# Patient Record
Sex: FEMALE | Race: BLACK OR AFRICAN AMERICAN | NOT HISPANIC OR LATINO | URBAN - METROPOLITAN AREA
[De-identification: names, ages, dates, MRNs, and addresses within clinical notes are randomized per-mention and may not be internally consistent; named-entity substitution may affect disease eponyms.]

---

## 2017-01-03 ENCOUNTER — OUTPATIENT (OUTPATIENT)
Dept: OUTPATIENT SERVICES | Facility: HOSPITAL | Age: 62
LOS: 1 days | End: 2017-01-03
Payer: MEDICARE

## 2017-01-03 ENCOUNTER — APPOINTMENT (OUTPATIENT)
Dept: SPINE | Facility: CLINIC | Age: 62
End: 2017-01-03

## 2017-01-03 ENCOUNTER — APPOINTMENT (OUTPATIENT)
Age: 62
End: 2017-01-03

## 2017-01-03 VITALS
HEIGHT: 65 IN | WEIGHT: 184 LBS | DIASTOLIC BLOOD PRESSURE: 77 MMHG | BODY MASS INDEX: 30.66 KG/M2 | SYSTOLIC BLOOD PRESSURE: 129 MMHG

## 2017-01-03 DIAGNOSIS — Z78.9 OTHER SPECIFIED HEALTH STATUS: ICD-10-CM

## 2017-01-03 DIAGNOSIS — Z85.9 PERSONAL HISTORY OF MALIGNANT NEOPLASM, UNSPECIFIED: ICD-10-CM

## 2017-01-03 DIAGNOSIS — C07 MALIGNANT NEOPLASM OF PAROTID GLAND: Chronic | ICD-10-CM

## 2017-01-03 DIAGNOSIS — A18.12 TUBERCULOSIS OF BLADDER: ICD-10-CM

## 2017-01-03 DIAGNOSIS — C43.72 MALIGNANT MELANOMA OF LEFT LOWER LIMB, INCLUDING HIP: Chronic | ICD-10-CM

## 2017-01-03 DIAGNOSIS — Z98.89 OTHER SPECIFIED POSTPROCEDURAL STATES: Chronic | ICD-10-CM

## 2017-01-03 DIAGNOSIS — M51.16 INTERVERTEBRAL DISC DISORDERS WITH RADICULOPATHY, LUMBAR REGION: ICD-10-CM

## 2017-01-03 DIAGNOSIS — C50.911 MALIGNANT NEOPLASM OF UNSPECIFIED SITE OF RIGHT FEMALE BREAST: Chronic | ICD-10-CM

## 2017-01-03 DIAGNOSIS — H50.9 UNSPECIFIED STRABISMUS: Chronic | ICD-10-CM

## 2017-01-03 PROCEDURE — 72083 X-RAY EXAM ENTIRE SPI 4/5 VW: CPT | Mod: 26

## 2017-01-03 RX ORDER — METOPROLOL SUCCINATE 50 MG/1
50 TABLET, EXTENDED RELEASE ORAL
Qty: 90 | Refills: 0 | Status: COMPLETED | COMMUNITY
Start: 2016-12-18

## 2017-01-03 RX ORDER — ADHESIVE TAPE 3"X 2.3 YD
50 MCG TAPE, NON-MEDICATED TOPICAL
Refills: 0 | Status: ACTIVE | COMMUNITY

## 2017-01-12 ENCOUNTER — OUTPATIENT (OUTPATIENT)
Dept: OUTPATIENT SERVICES | Facility: HOSPITAL | Age: 62
LOS: 1 days | End: 2017-01-12
Payer: MEDICARE

## 2017-01-12 ENCOUNTER — RESULT REVIEW (OUTPATIENT)
Age: 62
End: 2017-01-12

## 2017-01-12 ENCOUNTER — FORM ENCOUNTER (OUTPATIENT)
Age: 62
End: 2017-01-12

## 2017-01-12 ENCOUNTER — APPOINTMENT (OUTPATIENT)
Dept: RADIOLOGY | Facility: IMAGING CENTER | Age: 62
End: 2017-01-12

## 2017-01-12 ENCOUNTER — OUTPATIENT (OUTPATIENT)
Dept: OUTPATIENT SERVICES | Facility: HOSPITAL | Age: 62
LOS: 1 days | Discharge: HOME | End: 2017-01-12

## 2017-01-12 DIAGNOSIS — C50.911 MALIGNANT NEOPLASM OF UNSPECIFIED SITE OF RIGHT FEMALE BREAST: Chronic | ICD-10-CM

## 2017-01-12 DIAGNOSIS — C43.72 MALIGNANT MELANOMA OF LEFT LOWER LIMB, INCLUDING HIP: Chronic | ICD-10-CM

## 2017-01-12 DIAGNOSIS — Z98.89 OTHER SPECIFIED POSTPROCEDURAL STATES: Chronic | ICD-10-CM

## 2017-01-12 DIAGNOSIS — M51.16 INTERVERTEBRAL DISC DISORDERS WITH RADICULOPATHY, LUMBAR REGION: ICD-10-CM

## 2017-01-12 DIAGNOSIS — C07 MALIGNANT NEOPLASM OF PAROTID GLAND: Chronic | ICD-10-CM

## 2017-01-12 DIAGNOSIS — H50.9 UNSPECIFIED STRABISMUS: Chronic | ICD-10-CM

## 2017-01-12 DIAGNOSIS — C50.919 MALIGNANT NEOPLASM OF UNSPECIFIED SITE OF UNSPECIFIED FEMALE BREAST: ICD-10-CM

## 2017-01-12 PROCEDURE — 77080 DXA BONE DENSITY AXIAL: CPT

## 2017-01-13 ENCOUNTER — LABORATORY RESULT (OUTPATIENT)
Age: 62
End: 2017-01-13

## 2017-01-13 ENCOUNTER — APPOINTMENT (OUTPATIENT)
Dept: ULTRASOUND IMAGING | Facility: IMAGING CENTER | Age: 62
End: 2017-01-13

## 2017-01-13 ENCOUNTER — APPOINTMENT (OUTPATIENT)
Dept: INFUSION THERAPY | Facility: HOSPITAL | Age: 62
End: 2017-01-13

## 2017-01-13 ENCOUNTER — OUTPATIENT (OUTPATIENT)
Dept: OUTPATIENT SERVICES | Facility: HOSPITAL | Age: 62
LOS: 1 days | End: 2017-01-13
Payer: MEDICARE

## 2017-01-13 ENCOUNTER — APPOINTMENT (OUTPATIENT)
Dept: MRI IMAGING | Facility: IMAGING CENTER | Age: 62
End: 2017-01-13

## 2017-01-13 DIAGNOSIS — D10.5 BENIGN NEOPLASM OF OTHER PARTS OF OROPHARYNX: ICD-10-CM

## 2017-01-13 DIAGNOSIS — Z98.89 OTHER SPECIFIED POSTPROCEDURAL STATES: Chronic | ICD-10-CM

## 2017-01-13 DIAGNOSIS — H50.9 UNSPECIFIED STRABISMUS: Chronic | ICD-10-CM

## 2017-01-13 DIAGNOSIS — C50.911 MALIGNANT NEOPLASM OF UNSPECIFIED SITE OF RIGHT FEMALE BREAST: Chronic | ICD-10-CM

## 2017-01-13 DIAGNOSIS — C43.72 MALIGNANT MELANOMA OF LEFT LOWER LIMB, INCLUDING HIP: Chronic | ICD-10-CM

## 2017-01-13 DIAGNOSIS — R93.8 ABNORMAL FINDINGS ON DIAGNOSTIC IMAGING OF OTHER SPECIFIED BODY STRUCTURES: ICD-10-CM

## 2017-01-13 DIAGNOSIS — C07 MALIGNANT NEOPLASM OF PAROTID GLAND: Chronic | ICD-10-CM

## 2017-01-13 LAB
BASOPHILS # BLD AUTO: 0 K/UL — SIGNIFICANT CHANGE UP (ref 0–0.2)
BASOPHILS NFR BLD AUTO: 1.3 % — SIGNIFICANT CHANGE UP (ref 0–2)
EOSINOPHIL # BLD AUTO: 0.1 K/UL — SIGNIFICANT CHANGE UP (ref 0–0.5)
EOSINOPHIL NFR BLD AUTO: 2.5 % — SIGNIFICANT CHANGE UP (ref 0–6)
HCT VFR BLD CALC: 37.3 % — SIGNIFICANT CHANGE UP (ref 34.5–45)
HGB BLD-MCNC: 12.1 G/DL — SIGNIFICANT CHANGE UP (ref 11.5–15.5)
LYMPHOCYTES # BLD AUTO: 1.1 K/UL — SIGNIFICANT CHANGE UP (ref 1–3.3)
LYMPHOCYTES # BLD AUTO: 29.6 % — SIGNIFICANT CHANGE UP (ref 13–44)
MCHC RBC-ENTMCNC: 31.4 PG — SIGNIFICANT CHANGE UP (ref 27–34)
MCHC RBC-ENTMCNC: 32.6 GM/DL — SIGNIFICANT CHANGE UP (ref 32–36)
MCV RBC AUTO: 96.3 FL — SIGNIFICANT CHANGE UP (ref 80–100)
MONOCYTES # BLD AUTO: 0.3 K/UL — SIGNIFICANT CHANGE UP (ref 0–0.9)
MONOCYTES NFR BLD AUTO: 7.4 % — SIGNIFICANT CHANGE UP (ref 2–14)
NEUTROPHILS # BLD AUTO: 2.2 K/UL — SIGNIFICANT CHANGE UP (ref 1.8–7.4)
NEUTROPHILS NFR BLD AUTO: 59.3 % — SIGNIFICANT CHANGE UP (ref 43–77)
PLATELET # BLD AUTO: 223 K/UL — SIGNIFICANT CHANGE UP (ref 150–400)
RBC # BLD: 3.87 M/UL — SIGNIFICANT CHANGE UP (ref 3.8–5.2)
RBC # FLD: 11.5 % — SIGNIFICANT CHANGE UP (ref 10.3–14.5)
WBC # BLD: 3.8 K/UL — SIGNIFICANT CHANGE UP (ref 3.8–10.5)
WBC # FLD AUTO: 3.8 K/UL — SIGNIFICANT CHANGE UP (ref 3.8–10.5)

## 2017-01-13 PROCEDURE — 76830 TRANSVAGINAL US NON-OB: CPT

## 2017-01-13 PROCEDURE — 82565 ASSAY OF CREATININE: CPT

## 2017-01-13 PROCEDURE — 76856 US EXAM PELVIC COMPLETE: CPT

## 2017-01-13 PROCEDURE — A9585: CPT

## 2017-01-13 PROCEDURE — 70543 MRI ORBT/FAC/NCK W/O &W/DYE: CPT

## 2017-01-18 DIAGNOSIS — C50.919 MALIGNANT NEOPLASM OF UNSPECIFIED SITE OF UNSPECIFIED FEMALE BREAST: ICD-10-CM

## 2017-01-18 DIAGNOSIS — N83.202 UNSPECIFIED OVARIAN CYST, LEFT SIDE: ICD-10-CM

## 2017-01-18 DIAGNOSIS — J39.2 OTHER DISEASES OF PHARYNX: ICD-10-CM

## 2017-01-18 DIAGNOSIS — D25.1 INTRAMURAL LEIOMYOMA OF UTERUS: ICD-10-CM

## 2017-01-31 ENCOUNTER — APPOINTMENT (OUTPATIENT)
Dept: SPINE | Facility: CLINIC | Age: 62
End: 2017-01-31

## 2017-01-31 VITALS
WEIGHT: 184 LBS | HEART RATE: 67 BPM | DIASTOLIC BLOOD PRESSURE: 68 MMHG | BODY MASS INDEX: 30.66 KG/M2 | HEIGHT: 65 IN | SYSTOLIC BLOOD PRESSURE: 108 MMHG

## 2017-01-31 RX ORDER — TRAMADOL HYDROCHLORIDE 50 MG/1
50 TABLET, COATED ORAL
Qty: 60 | Refills: 0 | Status: COMPLETED | COMMUNITY
Start: 2016-12-16 | End: 2017-01-31

## 2017-01-31 RX ORDER — METOPROLOL SUCCINATE 50 MG/1
50 TABLET, EXTENDED RELEASE ORAL
Refills: 0 | Status: COMPLETED | COMMUNITY
End: 2017-01-31

## 2017-01-31 RX ORDER — ENALAPRIL MALEATE 5 MG/1
5 TABLET ORAL
Qty: 90 | Refills: 0 | Status: COMPLETED | COMMUNITY
Start: 2016-12-28 | End: 2017-01-31

## 2017-01-31 RX ORDER — GABAPENTIN 600 MG/1
600 TABLET, COATED ORAL
Qty: 180 | Refills: 0 | Status: COMPLETED | COMMUNITY
Start: 2016-12-18 | End: 2017-01-31

## 2017-02-08 ENCOUNTER — OUTPATIENT (OUTPATIENT)
Dept: OUTPATIENT SERVICES | Facility: HOSPITAL | Age: 62
LOS: 1 days | Discharge: HOME | End: 2017-02-08

## 2017-02-08 DIAGNOSIS — C43.72 MALIGNANT MELANOMA OF LEFT LOWER LIMB, INCLUDING HIP: Chronic | ICD-10-CM

## 2017-02-08 DIAGNOSIS — C50.919 MALIGNANT NEOPLASM OF UNSPECIFIED SITE OF UNSPECIFIED FEMALE BREAST: ICD-10-CM

## 2017-02-08 DIAGNOSIS — Z98.89 OTHER SPECIFIED POSTPROCEDURAL STATES: Chronic | ICD-10-CM

## 2017-02-08 DIAGNOSIS — C07 MALIGNANT NEOPLASM OF PAROTID GLAND: Chronic | ICD-10-CM

## 2017-02-08 DIAGNOSIS — C50.911 MALIGNANT NEOPLASM OF UNSPECIFIED SITE OF RIGHT FEMALE BREAST: Chronic | ICD-10-CM

## 2017-02-08 DIAGNOSIS — H50.9 UNSPECIFIED STRABISMUS: Chronic | ICD-10-CM

## 2017-02-10 ENCOUNTER — APPOINTMENT (OUTPATIENT)
Dept: INFUSION THERAPY | Facility: HOSPITAL | Age: 62
End: 2017-02-10

## 2017-02-17 ENCOUNTER — APPOINTMENT (OUTPATIENT)
Dept: INFUSION THERAPY | Facility: HOSPITAL | Age: 62
End: 2017-02-17

## 2017-02-28 ENCOUNTER — APPOINTMENT (OUTPATIENT)
Dept: INTERNAL MEDICINE | Facility: CLINIC | Age: 62
End: 2017-02-28

## 2017-02-28 VITALS — HEART RATE: 69 BPM | WEIGHT: 180 LBS | BODY MASS INDEX: 29.99 KG/M2 | HEIGHT: 65 IN

## 2017-02-28 VITALS — SYSTOLIC BLOOD PRESSURE: 120 MMHG | DIASTOLIC BLOOD PRESSURE: 80 MMHG

## 2017-03-01 LAB
ALBUMIN SERPL ELPH-MCNC: 4.2 G/DL
ALP BLD-CCNC: 103 U/L
ALT SERPL-CCNC: 8 U/L
ANION GAP SERPL CALC-SCNC: 16 MMOL/L
AST SERPL-CCNC: 14 U/L
BASOPHILS # BLD AUTO: 0.03 K/UL
BASOPHILS NFR BLD AUTO: 0.5 %
BILIRUB SERPL-MCNC: 0.3 MG/DL
BUN SERPL-MCNC: 15 MG/DL
CALCIUM SERPL-MCNC: 9.7 MG/DL
CHLORIDE SERPL-SCNC: 102 MMOL/L
CO2 SERPL-SCNC: 25 MMOL/L
CREAT SERPL-MCNC: 0.96 MG/DL
EOSINOPHIL # BLD AUTO: 0.2 K/UL
EOSINOPHIL NFR BLD AUTO: 3.4 %
GLUCOSE SERPL-MCNC: 87 MG/DL
HBA1C MFR BLD HPLC: 6 %
HCT VFR BLD CALC: 38.2 %
HGB BLD-MCNC: 12 G/DL
IMM GRANULOCYTES NFR BLD AUTO: 0.2 %
LYMPHOCYTES # BLD AUTO: 1.46 K/UL
LYMPHOCYTES NFR BLD AUTO: 25 %
MAN DIFF?: NORMAL
MCHC RBC-ENTMCNC: 31.4 GM/DL
MCHC RBC-ENTMCNC: 31.7 PG
MCV RBC AUTO: 100.8 FL
MONOCYTES # BLD AUTO: 0.4 K/UL
MONOCYTES NFR BLD AUTO: 6.9 %
NEUTROPHILS # BLD AUTO: 3.73 K/UL
NEUTROPHILS NFR BLD AUTO: 64 %
PLATELET # BLD AUTO: 218 K/UL
POTASSIUM SERPL-SCNC: 4.6 MMOL/L
PROT SERPL-MCNC: 6.9 G/DL
RBC # BLD: 3.79 M/UL
RBC # FLD: 12.7 %
SODIUM SERPL-SCNC: 143 MMOL/L
TSH SERPL-ACNC: 1.43 UIU/ML
WBC # FLD AUTO: 5.83 K/UL

## 2017-03-02 ENCOUNTER — MESSAGE (OUTPATIENT)
Age: 62
End: 2017-03-02

## 2017-03-06 ENCOUNTER — APPOINTMENT (OUTPATIENT)
Dept: OPHTHALMOLOGY | Facility: CLINIC | Age: 62
End: 2017-03-06

## 2017-03-06 DIAGNOSIS — H04.201 UNSPECIFIED EPIPHORA, RIGHT SIDE: ICD-10-CM

## 2017-03-10 ENCOUNTER — MESSAGE (OUTPATIENT)
Age: 62
End: 2017-03-10

## 2017-03-15 ENCOUNTER — RESULT CHARGE (OUTPATIENT)
Age: 62
End: 2017-03-15

## 2017-03-16 ENCOUNTER — APPOINTMENT (OUTPATIENT)
Dept: INTERNAL MEDICINE | Facility: CLINIC | Age: 62
End: 2017-03-16

## 2017-03-16 ENCOUNTER — OUTPATIENT (OUTPATIENT)
Dept: OUTPATIENT SERVICES | Facility: HOSPITAL | Age: 62
LOS: 1 days | Discharge: HOME | End: 2017-03-16

## 2017-03-16 ENCOUNTER — RESULT REVIEW (OUTPATIENT)
Age: 62
End: 2017-03-16

## 2017-03-16 ENCOUNTER — OUTPATIENT (OUTPATIENT)
Dept: OUTPATIENT SERVICES | Facility: HOSPITAL | Age: 62
LOS: 1 days | End: 2017-03-16
Payer: MEDICARE

## 2017-03-16 ENCOUNTER — NON-APPOINTMENT (OUTPATIENT)
Age: 62
End: 2017-03-16

## 2017-03-16 VITALS
SYSTOLIC BLOOD PRESSURE: 120 MMHG | DIASTOLIC BLOOD PRESSURE: 72 MMHG | OXYGEN SATURATION: 95 % | HEART RATE: 73 BPM | WEIGHT: 177 LBS | HEIGHT: 65 IN | BODY MASS INDEX: 29.49 KG/M2

## 2017-03-16 VITALS
DIASTOLIC BLOOD PRESSURE: 79 MMHG | RESPIRATION RATE: 16 BRPM | HEART RATE: 79 BPM | SYSTOLIC BLOOD PRESSURE: 116 MMHG | TEMPERATURE: 97 F | HEIGHT: 65 IN | WEIGHT: 174.17 LBS | OXYGEN SATURATION: 97 %

## 2017-03-16 DIAGNOSIS — C50.911 MALIGNANT NEOPLASM OF UNSPECIFIED SITE OF RIGHT FEMALE BREAST: Chronic | ICD-10-CM

## 2017-03-16 DIAGNOSIS — H50.9 UNSPECIFIED STRABISMUS: Chronic | ICD-10-CM

## 2017-03-16 DIAGNOSIS — I10 ESSENTIAL (PRIMARY) HYPERTENSION: ICD-10-CM

## 2017-03-16 DIAGNOSIS — M48.06 SPINAL STENOSIS, LUMBAR REGION: ICD-10-CM

## 2017-03-16 DIAGNOSIS — Z98.89 OTHER SPECIFIED POSTPROCEDURAL STATES: Chronic | ICD-10-CM

## 2017-03-16 DIAGNOSIS — M54.16 RADICULOPATHY, LUMBAR REGION: ICD-10-CM

## 2017-03-16 DIAGNOSIS — C43.72 MALIGNANT MELANOMA OF LEFT LOWER LIMB, INCLUDING HIP: Chronic | ICD-10-CM

## 2017-03-16 DIAGNOSIS — Z01.818 ENCOUNTER FOR OTHER PREPROCEDURAL EXAMINATION: ICD-10-CM

## 2017-03-16 DIAGNOSIS — C07 MALIGNANT NEOPLASM OF PAROTID GLAND: Chronic | ICD-10-CM

## 2017-03-16 DIAGNOSIS — C50.919 MALIGNANT NEOPLASM OF UNSPECIFIED SITE OF UNSPECIFIED FEMALE BREAST: ICD-10-CM

## 2017-03-16 LAB
ANION GAP SERPL CALC-SCNC: 15 MMOL/L — SIGNIFICANT CHANGE UP (ref 5–17)
BLD GP AB SCN SERPL QL: NEGATIVE — SIGNIFICANT CHANGE UP
BUN SERPL-MCNC: 22 MG/DL — SIGNIFICANT CHANGE UP (ref 7–23)
CALCIUM SERPL-MCNC: 10 MG/DL — SIGNIFICANT CHANGE UP (ref 8.4–10.5)
CHLORIDE SERPL-SCNC: 104 MMOL/L — SIGNIFICANT CHANGE UP (ref 96–108)
CO2 SERPL-SCNC: 24 MMOL/L — SIGNIFICANT CHANGE UP (ref 22–31)
CREAT SERPL-MCNC: 1.31 MG/DL — HIGH (ref 0.5–1.3)
GLUCOSE SERPL-MCNC: 103 MG/DL — HIGH (ref 70–99)
HCT VFR BLD CALC: 38.5 % — SIGNIFICANT CHANGE UP (ref 34.5–45)
HGB BLD-MCNC: 12.4 G/DL — SIGNIFICANT CHANGE UP (ref 11.5–15.5)
MCHC RBC-ENTMCNC: 31.4 PG — SIGNIFICANT CHANGE UP (ref 27–34)
MCHC RBC-ENTMCNC: 32.2 GM/DL — SIGNIFICANT CHANGE UP (ref 32–36)
MCV RBC AUTO: 97.5 FL — SIGNIFICANT CHANGE UP (ref 80–100)
PLATELET # BLD AUTO: 233 K/UL — SIGNIFICANT CHANGE UP (ref 150–400)
POTASSIUM SERPL-MCNC: 4.6 MMOL/L — SIGNIFICANT CHANGE UP (ref 3.5–5.3)
POTASSIUM SERPL-SCNC: 4.6 MMOL/L — SIGNIFICANT CHANGE UP (ref 3.5–5.3)
RBC # BLD: 3.95 M/UL — SIGNIFICANT CHANGE UP (ref 3.8–5.2)
RBC # FLD: 12.2 % — SIGNIFICANT CHANGE UP (ref 10.3–14.5)
RH IG SCN BLD-IMP: NEGATIVE — SIGNIFICANT CHANGE UP
SODIUM SERPL-SCNC: 143 MMOL/L — SIGNIFICANT CHANGE UP (ref 135–145)
WBC # BLD: 4.67 K/UL — SIGNIFICANT CHANGE UP (ref 3.8–10.5)
WBC # FLD AUTO: 4.67 K/UL — SIGNIFICANT CHANGE UP (ref 3.8–10.5)

## 2017-03-16 PROCEDURE — 86901 BLOOD TYPING SEROLOGIC RH(D): CPT

## 2017-03-16 PROCEDURE — 80048 BASIC METABOLIC PNL TOTAL CA: CPT

## 2017-03-16 PROCEDURE — 85027 COMPLETE CBC AUTOMATED: CPT

## 2017-03-16 PROCEDURE — 86900 BLOOD TYPING SEROLOGIC ABO: CPT

## 2017-03-16 PROCEDURE — 86850 RBC ANTIBODY SCREEN: CPT

## 2017-03-16 RX ORDER — VANCOMYCIN HCL 1 G
1000 VIAL (EA) INTRAVENOUS ONCE
Qty: 0 | Refills: 0 | Status: COMPLETED | OUTPATIENT
Start: 2017-03-23 | End: 2017-03-23

## 2017-03-16 RX ORDER — AZITHROMYCIN 250 MG/1
250 TABLET, FILM COATED ORAL
Qty: 1 | Refills: 0 | Status: DISCONTINUED | COMMUNITY
Start: 2017-03-02 | End: 2017-03-16

## 2017-03-16 NOTE — H&P PST ADULT - NEGATIVE ENMT SYMPTOMS
no ear pain/no sinus symptoms/no tinnitus/no nasal congestion/no nasal obstruction/no hearing difficulty/no nasal discharge/no vertigo

## 2017-03-16 NOTE — H&P PST ADULT - PSH
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

## 2017-03-16 NOTE — H&P PST ADULT - ENT GEN HX ROS MEA POS PC
difficulty opening /stretching mouth completely after parotid surgery, denies  any swallowing difficulty

## 2017-03-16 NOTE — H&P PST ADULT - NEUROLOGICAL DETAILS
alert and oriented x 3/responds to verbal commands/cranial nerves intact/normal strength/responds to pain

## 2017-03-16 NOTE — H&P PST ADULT - HISTORY OF PRESENT ILLNESS
60 y/o female H/O Bilateral Breast Cancers, S/P B/l mastectomy & reconstruction, Chemo 2007, Radiation 2014, excision right parotid gland (with RT 1999),   Left foot melanoma excision( 2008)   C/O back pain with lumbar radiculopathy "with pain, numbness & tingling in back of both legs & worse in right leg for about two years".  Seen by MD. Presents for lumbar radiculopathy L4-5 on 03/23/17.

## 2017-03-16 NOTE — H&P PST ADULT - NEGATIVE GENERAL SYMPTOMS
no malaise/no fever/no anorexia/no weight gain/no sweating/no chills/no polyphagia/no polyuria/no weight loss/no fatigue

## 2017-03-16 NOTE — H&P PST ADULT - PMH
Anxiety    Breast cancer in situ, right  Chemo 2007, Radiation 2014  Capsular contracture of breast implant, initial encounter  right breast  Essential hypertension    Facial droop  right side, due to parotid surgery  Gastroesophageal reflux disease without esophagitis    Lumbar radiculopathy    Melanoma in situ  left foot  Parotid gland adenocarcinoma    Prediabetes    TB (tuberculosis)  20 yrs ago treated for 1 year with medication

## 2017-03-16 NOTE — H&P PST ADULT - FAMILY HISTORY
Mother  Still living? Yes, Estimated age: 78  Family history of hypertension, Age at diagnosis: Age Unknown

## 2017-03-16 NOTE — H&P PST ADULT - NSANTHOSAYNRD_GEN_A_CORE
No. ANNE screening performed.  STOP BANG Legend: 0-2 = LOW Risk; 3-4 = INTERMEDIATE Risk; 5-8 = HIGH Risk

## 2017-03-17 ENCOUNTER — LABORATORY RESULT (OUTPATIENT)
Age: 62
End: 2017-03-17

## 2017-03-17 ENCOUNTER — APPOINTMENT (OUTPATIENT)
Dept: INFUSION THERAPY | Facility: HOSPITAL | Age: 62
End: 2017-03-17

## 2017-03-17 LAB
HCT VFR BLD CALC: 35.9 % — SIGNIFICANT CHANGE UP (ref 34.5–45)
HGB BLD-MCNC: 12.3 G/DL — SIGNIFICANT CHANGE UP (ref 11.5–15.5)
MCHC RBC-ENTMCNC: 32.6 PG — SIGNIFICANT CHANGE UP (ref 27–34)
MCHC RBC-ENTMCNC: 34.2 G/DL — SIGNIFICANT CHANGE UP (ref 32–36)
MCV RBC AUTO: 95.3 FL — SIGNIFICANT CHANGE UP (ref 80–100)
PLATELET # BLD AUTO: 191 K/UL — SIGNIFICANT CHANGE UP (ref 150–400)
RBC # BLD: 3.77 M/UL — LOW (ref 3.8–5.2)
RBC # FLD: 11 % — SIGNIFICANT CHANGE UP (ref 10.3–14.5)
WBC # BLD: 4.9 K/UL — SIGNIFICANT CHANGE UP (ref 3.8–10.5)
WBC # FLD AUTO: 4.9 K/UL — SIGNIFICANT CHANGE UP (ref 3.8–10.5)

## 2017-03-23 ENCOUNTER — TRANSCRIPTION ENCOUNTER (OUTPATIENT)
Age: 62
End: 2017-03-23

## 2017-03-23 ENCOUNTER — INPATIENT (INPATIENT)
Facility: HOSPITAL | Age: 62
LOS: 7 days | Discharge: HOME | DRG: 460 | End: 2017-03-31
Attending: NEUROLOGICAL SURGERY | Admitting: NEUROLOGICAL SURGERY
Payer: MEDICARE

## 2017-03-23 VITALS
HEART RATE: 73 BPM | HEIGHT: 63 IN | SYSTOLIC BLOOD PRESSURE: 134 MMHG | OXYGEN SATURATION: 98 % | RESPIRATION RATE: 18 BRPM | TEMPERATURE: 98 F | WEIGHT: 175.93 LBS | DIASTOLIC BLOOD PRESSURE: 88 MMHG

## 2017-03-23 DIAGNOSIS — C07 MALIGNANT NEOPLASM OF PAROTID GLAND: Chronic | ICD-10-CM

## 2017-03-23 DIAGNOSIS — M48.06 SPINAL STENOSIS, LUMBAR REGION: ICD-10-CM

## 2017-03-23 DIAGNOSIS — H50.9 UNSPECIFIED STRABISMUS: Chronic | ICD-10-CM

## 2017-03-23 DIAGNOSIS — C43.72 MALIGNANT MELANOMA OF LEFT LOWER LIMB, INCLUDING HIP: Chronic | ICD-10-CM

## 2017-03-23 DIAGNOSIS — Z98.89 OTHER SPECIFIED POSTPROCEDURAL STATES: Chronic | ICD-10-CM

## 2017-03-23 DIAGNOSIS — C50.911 MALIGNANT NEOPLASM OF UNSPECIFIED SITE OF RIGHT FEMALE BREAST: Chronic | ICD-10-CM

## 2017-03-23 LAB
ANION GAP SERPL CALC-SCNC: 14 MMOL/L — SIGNIFICANT CHANGE UP (ref 5–17)
BASOPHILS # BLD AUTO: 0 K/UL — SIGNIFICANT CHANGE UP (ref 0–0.2)
BASOPHILS NFR BLD AUTO: 0.1 % — SIGNIFICANT CHANGE UP (ref 0–2)
BLD GP AB SCN SERPL QL: NEGATIVE — SIGNIFICANT CHANGE UP
BUN SERPL-MCNC: 10 MG/DL — SIGNIFICANT CHANGE UP (ref 7–23)
CALCIUM SERPL-MCNC: 8 MG/DL — LOW (ref 8.4–10.5)
CHLORIDE SERPL-SCNC: 114 MMOL/L — HIGH (ref 96–108)
CO2 SERPL-SCNC: 18 MMOL/L — LOW (ref 22–31)
CREAT SERPL-MCNC: 0.81 MG/DL — SIGNIFICANT CHANGE UP (ref 0.5–1.3)
EOSINOPHIL # BLD AUTO: 0 K/UL — SIGNIFICANT CHANGE UP (ref 0–0.5)
EOSINOPHIL NFR BLD AUTO: 0.2 % — SIGNIFICANT CHANGE UP (ref 0–6)
GAS PNL BLDA: SIGNIFICANT CHANGE UP
GLUCOSE SERPL-MCNC: 203 MG/DL — HIGH (ref 70–99)
HCT VFR BLD CALC: 26.5 % — LOW (ref 34.5–45)
HCT VFR BLD CALC: 34.5 % — SIGNIFICANT CHANGE UP (ref 34.5–45)
HGB BLD-MCNC: 11.7 G/DL — SIGNIFICANT CHANGE UP (ref 11.5–15.5)
HGB BLD-MCNC: 9.3 G/DL — LOW (ref 11.5–15.5)
LYMPHOCYTES # BLD AUTO: 1 K/UL — SIGNIFICANT CHANGE UP (ref 1–3.3)
LYMPHOCYTES # BLD AUTO: 10.3 % — LOW (ref 13–44)
MCHC RBC-ENTMCNC: 31.9 PG — SIGNIFICANT CHANGE UP (ref 27–34)
MCHC RBC-ENTMCNC: 33.5 PG — SIGNIFICANT CHANGE UP (ref 27–34)
MCHC RBC-ENTMCNC: 34 GM/DL — SIGNIFICANT CHANGE UP (ref 32–36)
MCHC RBC-ENTMCNC: 35 GM/DL — SIGNIFICANT CHANGE UP (ref 32–36)
MCV RBC AUTO: 93.8 FL — SIGNIFICANT CHANGE UP (ref 80–100)
MCV RBC AUTO: 95.8 FL — SIGNIFICANT CHANGE UP (ref 80–100)
MONOCYTES # BLD AUTO: 0.2 K/UL — SIGNIFICANT CHANGE UP (ref 0–0.9)
MONOCYTES NFR BLD AUTO: 2.3 % — SIGNIFICANT CHANGE UP (ref 2–14)
NEUTROPHILS # BLD AUTO: 8.4 K/UL — HIGH (ref 1.8–7.4)
NEUTROPHILS NFR BLD AUTO: 87 % — HIGH (ref 43–77)
PLATELET # BLD AUTO: 112 K/UL — LOW (ref 150–400)
PLATELET # BLD AUTO: 124 K/UL — LOW (ref 150–400)
POTASSIUM SERPL-MCNC: 3.8 MMOL/L — SIGNIFICANT CHANGE UP (ref 3.5–5.3)
POTASSIUM SERPL-SCNC: 3.8 MMOL/L — SIGNIFICANT CHANGE UP (ref 3.5–5.3)
RBC # BLD: 2.76 M/UL — LOW (ref 3.8–5.2)
RBC # BLD: 3.68 M/UL — LOW (ref 3.8–5.2)
RBC # FLD: 12.2 % — SIGNIFICANT CHANGE UP (ref 10.3–14.5)
RBC # FLD: 12.5 % — SIGNIFICANT CHANGE UP (ref 10.3–14.5)
RH IG SCN BLD-IMP: NEGATIVE — SIGNIFICANT CHANGE UP
SODIUM SERPL-SCNC: 146 MMOL/L — HIGH (ref 135–145)
WBC # BLD: 9.6 K/UL — SIGNIFICANT CHANGE UP (ref 3.8–10.5)
WBC # BLD: 9.7 K/UL — SIGNIFICANT CHANGE UP (ref 3.8–10.5)
WBC # FLD AUTO: 9.6 K/UL — SIGNIFICANT CHANGE UP (ref 3.8–10.5)
WBC # FLD AUTO: 9.7 K/UL — SIGNIFICANT CHANGE UP (ref 3.8–10.5)

## 2017-03-23 PROCEDURE — 93010 ELECTROCARDIOGRAM REPORT: CPT

## 2017-03-23 RX ORDER — HYDROMORPHONE HYDROCHLORIDE 2 MG/ML
0.5 INJECTION INTRAMUSCULAR; INTRAVENOUS; SUBCUTANEOUS
Qty: 0 | Refills: 0 | Status: DISCONTINUED | OUTPATIENT
Start: 2017-03-23 | End: 2017-03-23

## 2017-03-23 RX ORDER — DIAZEPAM 5 MG
5 TABLET ORAL EVERY 8 HOURS
Qty: 0 | Refills: 0 | Status: DISCONTINUED | OUTPATIENT
Start: 2017-03-23 | End: 2017-03-24

## 2017-03-23 RX ORDER — SODIUM CHLORIDE 9 MG/ML
1000 INJECTION, SOLUTION INTRAVENOUS ONCE
Qty: 0 | Refills: 0 | Status: COMPLETED | OUTPATIENT
Start: 2017-03-23 | End: 2017-03-23

## 2017-03-23 RX ORDER — HYDROMORPHONE HYDROCHLORIDE 2 MG/ML
30 INJECTION INTRAMUSCULAR; INTRAVENOUS; SUBCUTANEOUS
Qty: 0 | Refills: 0 | Status: DISCONTINUED | OUTPATIENT
Start: 2017-03-23 | End: 2017-03-25

## 2017-03-23 RX ORDER — NALOXONE HYDROCHLORIDE 4 MG/.1ML
0.1 SPRAY NASAL
Qty: 0 | Refills: 0 | Status: DISCONTINUED | OUTPATIENT
Start: 2017-03-23 | End: 2017-03-26

## 2017-03-23 RX ORDER — ACETAMINOPHEN 500 MG
1000 TABLET ORAL ONCE
Qty: 0 | Refills: 0 | Status: COMPLETED | OUTPATIENT
Start: 2017-03-23 | End: 2017-03-23

## 2017-03-23 RX ORDER — ENOXAPARIN SODIUM 100 MG/ML
40 INJECTION SUBCUTANEOUS AT BEDTIME
Qty: 0 | Refills: 0 | Status: DISCONTINUED | OUTPATIENT
Start: 2017-03-24 | End: 2017-03-31

## 2017-03-23 RX ORDER — SENNA PLUS 8.6 MG/1
2 TABLET ORAL AT BEDTIME
Qty: 0 | Refills: 0 | Status: DISCONTINUED | OUTPATIENT
Start: 2017-03-23 | End: 2017-03-31

## 2017-03-23 RX ORDER — ONDANSETRON 8 MG/1
4 TABLET, FILM COATED ORAL EVERY 6 HOURS
Qty: 0 | Refills: 0 | Status: DISCONTINUED | OUTPATIENT
Start: 2017-03-23 | End: 2017-03-23

## 2017-03-23 RX ORDER — DOCUSATE SODIUM 100 MG
100 CAPSULE ORAL THREE TIMES A DAY
Qty: 0 | Refills: 0 | Status: DISCONTINUED | OUTPATIENT
Start: 2017-03-23 | End: 2017-03-31

## 2017-03-23 RX ORDER — FAMOTIDINE 10 MG/ML
20 INJECTION INTRAVENOUS EVERY 12 HOURS
Qty: 0 | Refills: 0 | Status: DISCONTINUED | OUTPATIENT
Start: 2017-03-23 | End: 2017-03-31

## 2017-03-23 RX ORDER — HYDROMORPHONE HYDROCHLORIDE 2 MG/ML
0.5 INJECTION INTRAMUSCULAR; INTRAVENOUS; SUBCUTANEOUS
Qty: 0 | Refills: 0 | Status: DISCONTINUED | OUTPATIENT
Start: 2017-03-23 | End: 2017-03-25

## 2017-03-23 RX ORDER — PHENYLEPHRINE HYDROCHLORIDE 10 MG/ML
0.5 INJECTION INTRAVENOUS
Qty: 80 | Refills: 0 | Status: DISCONTINUED | OUTPATIENT
Start: 2017-03-23 | End: 2017-03-23

## 2017-03-23 RX ORDER — SODIUM CHLORIDE 9 MG/ML
3 INJECTION INTRAMUSCULAR; INTRAVENOUS; SUBCUTANEOUS EVERY 8 HOURS
Qty: 0 | Refills: 0 | Status: DISCONTINUED | OUTPATIENT
Start: 2017-03-23 | End: 2017-03-23

## 2017-03-23 RX ORDER — ONDANSETRON 8 MG/1
4 TABLET, FILM COATED ORAL EVERY 6 HOURS
Qty: 0 | Refills: 0 | Status: DISCONTINUED | OUTPATIENT
Start: 2017-03-23 | End: 2017-03-31

## 2017-03-23 RX ADMIN — HYDROMORPHONE HYDROCHLORIDE 30 MILLILITER(S): 2 INJECTION INTRAMUSCULAR; INTRAVENOUS; SUBCUTANEOUS at 14:19

## 2017-03-23 RX ADMIN — SENNA PLUS 2 TABLET(S): 8.6 TABLET ORAL at 22:36

## 2017-03-23 RX ADMIN — Medication 5 MILLIGRAM(S): at 22:36

## 2017-03-23 RX ADMIN — Medication 1000 MILLIGRAM(S): at 14:45

## 2017-03-23 RX ADMIN — Medication 100 MILLIGRAM(S): at 22:36

## 2017-03-23 RX ADMIN — HYDROMORPHONE HYDROCHLORIDE 0.5 MILLIGRAM(S): 2 INJECTION INTRAMUSCULAR; INTRAVENOUS; SUBCUTANEOUS at 14:32

## 2017-03-23 RX ADMIN — Medication 400 MILLIGRAM(S): at 13:00

## 2017-03-23 RX ADMIN — SODIUM CHLORIDE 2000 MILLILITER(S): 9 INJECTION, SOLUTION INTRAVENOUS at 14:46

## 2017-03-23 RX ADMIN — HYDROMORPHONE HYDROCHLORIDE 0.5 MILLIGRAM(S): 2 INJECTION INTRAMUSCULAR; INTRAVENOUS; SUBCUTANEOUS at 13:15

## 2017-03-23 RX ADMIN — PHENYLEPHRINE HYDROCHLORIDE 14.96 MICROGRAM(S)/KG/MIN: 10 INJECTION INTRAVENOUS at 14:46

## 2017-03-23 RX ADMIN — HYDROMORPHONE HYDROCHLORIDE 0.5 MILLIGRAM(S): 2 INJECTION INTRAMUSCULAR; INTRAVENOUS; SUBCUTANEOUS at 14:45

## 2017-03-24 LAB
ANION GAP SERPL CALC-SCNC: 13 MMOL/L — SIGNIFICANT CHANGE UP (ref 5–17)
BUN SERPL-MCNC: 8 MG/DL — SIGNIFICANT CHANGE UP (ref 7–23)
CALCIUM SERPL-MCNC: 8.4 MG/DL — SIGNIFICANT CHANGE UP (ref 8.4–10.5)
CHLORIDE SERPL-SCNC: 109 MMOL/L — HIGH (ref 96–108)
CO2 SERPL-SCNC: 18 MMOL/L — LOW (ref 22–31)
CREAT SERPL-MCNC: 0.75 MG/DL — SIGNIFICANT CHANGE UP (ref 0.5–1.3)
GLUCOSE SERPL-MCNC: 134 MG/DL — HIGH (ref 70–99)
HCT VFR BLD CALC: 31.5 % — LOW (ref 34.5–45)
HGB BLD-MCNC: 10.9 G/DL — LOW (ref 11.5–15.5)
MCHC RBC-ENTMCNC: 32.5 PG — SIGNIFICANT CHANGE UP (ref 27–34)
MCHC RBC-ENTMCNC: 34.7 GM/DL — SIGNIFICANT CHANGE UP (ref 32–36)
MCV RBC AUTO: 93.8 FL — SIGNIFICANT CHANGE UP (ref 80–100)
PLATELET # BLD AUTO: 110 K/UL — LOW (ref 150–400)
POTASSIUM SERPL-MCNC: 3.9 MMOL/L — SIGNIFICANT CHANGE UP (ref 3.5–5.3)
POTASSIUM SERPL-SCNC: 3.9 MMOL/L — SIGNIFICANT CHANGE UP (ref 3.5–5.3)
RBC # BLD: 3.36 M/UL — LOW (ref 3.8–5.2)
RBC # FLD: 13.6 % — SIGNIFICANT CHANGE UP (ref 10.3–14.5)
SODIUM SERPL-SCNC: 140 MMOL/L — SIGNIFICANT CHANGE UP (ref 135–145)
WBC # BLD: 10.9 K/UL — HIGH (ref 3.8–10.5)
WBC # FLD AUTO: 10.9 K/UL — HIGH (ref 3.8–10.5)

## 2017-03-24 RX ORDER — DEXTROSE MONOHYDRATE, SODIUM CHLORIDE, AND POTASSIUM CHLORIDE 50; .745; 4.5 G/1000ML; G/1000ML; G/1000ML
1000 INJECTION, SOLUTION INTRAVENOUS
Qty: 0 | Refills: 0 | Status: DISCONTINUED | OUTPATIENT
Start: 2017-03-24 | End: 2017-03-24

## 2017-03-24 RX ORDER — ACETYLCYSTEINE 200 MG/ML
5 VIAL (ML) MISCELLANEOUS EVERY 8 HOURS
Qty: 0 | Refills: 0 | Status: DISCONTINUED | OUTPATIENT
Start: 2017-03-24 | End: 2017-03-31

## 2017-03-24 RX ORDER — DEXTROSE MONOHYDRATE, SODIUM CHLORIDE, AND POTASSIUM CHLORIDE 50; .745; 4.5 G/1000ML; G/1000ML; G/1000ML
1000 INJECTION, SOLUTION INTRAVENOUS
Qty: 0 | Refills: 0 | Status: DISCONTINUED | OUTPATIENT
Start: 2017-03-24 | End: 2017-03-25

## 2017-03-24 RX ORDER — GABAPENTIN 400 MG/1
600 CAPSULE ORAL THREE TIMES A DAY
Qty: 0 | Refills: 0 | Status: DISCONTINUED | OUTPATIENT
Start: 2017-03-24 | End: 2017-03-31

## 2017-03-24 RX ORDER — SODIUM CHLORIDE 9 MG/ML
500 INJECTION INTRAMUSCULAR; INTRAVENOUS; SUBCUTANEOUS ONCE
Qty: 0 | Refills: 0 | Status: COMPLETED | OUTPATIENT
Start: 2017-03-24 | End: 2017-03-24

## 2017-03-24 RX ORDER — METOPROLOL TARTRATE 50 MG
50 TABLET ORAL DAILY
Qty: 0 | Refills: 0 | Status: DISCONTINUED | OUTPATIENT
Start: 2017-03-24 | End: 2017-03-27

## 2017-03-24 RX ADMIN — Medication 100 MILLIGRAM(S): at 14:07

## 2017-03-24 RX ADMIN — Medication 5 MILLIGRAM(S): at 12:09

## 2017-03-24 RX ADMIN — Medication 100 MILLIGRAM(S): at 05:15

## 2017-03-24 RX ADMIN — Medication 100 MILLIGRAM(S): at 21:16

## 2017-03-24 RX ADMIN — Medication 50 MILLIGRAM(S): at 12:09

## 2017-03-24 RX ADMIN — HYDROMORPHONE HYDROCHLORIDE 0.5 MILLIGRAM(S): 2 INJECTION INTRAMUSCULAR; INTRAVENOUS; SUBCUTANEOUS at 08:21

## 2017-03-24 RX ADMIN — HYDROMORPHONE HYDROCHLORIDE 0.5 MILLIGRAM(S): 2 INJECTION INTRAMUSCULAR; INTRAVENOUS; SUBCUTANEOUS at 12:25

## 2017-03-24 RX ADMIN — ENOXAPARIN SODIUM 40 MILLIGRAM(S): 100 INJECTION SUBCUTANEOUS at 21:23

## 2017-03-24 RX ADMIN — Medication 5 MILLIGRAM(S): at 05:15

## 2017-03-24 RX ADMIN — Medication 5 MILLILITER(S): at 22:10

## 2017-03-24 RX ADMIN — GABAPENTIN 600 MILLIGRAM(S): 400 CAPSULE ORAL at 21:16

## 2017-03-24 RX ADMIN — SENNA PLUS 2 TABLET(S): 8.6 TABLET ORAL at 21:16

## 2017-03-24 RX ADMIN — GABAPENTIN 600 MILLIGRAM(S): 400 CAPSULE ORAL at 14:29

## 2017-03-24 RX ADMIN — SODIUM CHLORIDE 2000 MILLILITER(S): 9 INJECTION INTRAMUSCULAR; INTRAVENOUS; SUBCUTANEOUS at 12:56

## 2017-03-24 NOTE — PHYSICAL THERAPY INITIAL EVALUATION ADULT - DIAGNOSIS, PT EVAL
decreased functional mobility secondary to decreased strength, decreased ROM, post-op pain & discomfort

## 2017-03-24 NOTE — PHYSICAL THERAPY INITIAL EVALUATION ADULT - PERTINENT HX OF CURRENT PROBLEM, REHAB EVAL
62 y/o female H/O Bilateral Breast Cancers, S/P B/l mastectomy & reconstruction, Chemo 2007, Radiation 2014, excision right parotid gland (with RT 1999), Left foot melanoma excision( 2008)   C/O back pain with lumbar radiculopathy "with pain, numbness & tingling in back of both legs & worse in right leg for about two years". Presents for lumbar laminectomy & PLIF on 03/23/17.

## 2017-03-24 NOTE — PHYSICAL THERAPY INITIAL EVALUATION ADULT - LIVES WITH, PROFILE
As per chart, pt lived at home alone in a mother-daughter house, pt's mother/sister live upstairs together. pt has 12 stairs to negotiate. pt was independent with adl, ambulated with a cane./alone As per chart, pt lived at home alone in a mother-daughter house, pt's mother/sister live upstairs together. pt has one step to enter, + additional 12 steps single HR to negotiate. pt was independent with adl, ambulated with a cane./alone

## 2017-03-24 NOTE — PROVIDER CONTACT NOTE (OTHER) - BACKGROUND
Status post lumbar laminectomy l4-l5 discectomy and fusion, 3/23/17.  Currently on ns0.9%+20meq potassium @50mL/hr, on dilaudid PCA pump.

## 2017-03-24 NOTE — PHYSICAL THERAPY INITIAL EVALUATION ADULT - GAIT DEVIATIONS NOTED, PT EVAL
decreased step length/increased time in double stance/stride width decreased/footdrop/decreased stride length/decreased weight-shifting ability

## 2017-03-24 NOTE — PHYSICAL THERAPY INITIAL EVALUATION ADULT - MANUAL MUSCLE TESTING RESULTS, REHAB EVAL
grossly assessed due to/BUE grossly 4/5 throughout, LLE grossly 3-/5, RLE grossly 2+/5, however pt inconsistently following commands 2* pain & lethargy

## 2017-03-25 LAB
APPEARANCE UR: CLEAR — SIGNIFICANT CHANGE UP
BILIRUB UR-MCNC: NEGATIVE — SIGNIFICANT CHANGE UP
COLOR SPEC: YELLOW — SIGNIFICANT CHANGE UP
DIFF PNL FLD: ABNORMAL
GLUCOSE UR QL: NEGATIVE — SIGNIFICANT CHANGE UP
HCT VFR BLD CALC: 30.1 % — LOW (ref 34.5–45)
HGB BLD-MCNC: 10 G/DL — LOW (ref 11.5–15.5)
KETONES UR-MCNC: ABNORMAL
LEUKOCYTE ESTERASE UR-ACNC: NEGATIVE — SIGNIFICANT CHANGE UP
MCHC RBC-ENTMCNC: 30.5 PG — SIGNIFICANT CHANGE UP (ref 27–34)
MCHC RBC-ENTMCNC: 33.2 GM/DL — SIGNIFICANT CHANGE UP (ref 32–36)
MCV RBC AUTO: 91.8 FL — SIGNIFICANT CHANGE UP (ref 80–100)
NITRITE UR-MCNC: NEGATIVE — SIGNIFICANT CHANGE UP
PH UR: 5 — SIGNIFICANT CHANGE UP (ref 4.8–8)
PLATELET # BLD AUTO: 115 K/UL — LOW (ref 150–400)
PROT UR-MCNC: NEGATIVE — SIGNIFICANT CHANGE UP
RBC # BLD: 3.28 M/UL — LOW (ref 3.8–5.2)
RBC # FLD: 14.9 % — HIGH (ref 10.3–14.5)
SP GR SPEC: 1.02 — SIGNIFICANT CHANGE UP (ref 1.01–1.02)
UROBILINOGEN FLD QL: NEGATIVE — SIGNIFICANT CHANGE UP
WBC # BLD: 10.98 K/UL — HIGH (ref 3.8–10.5)
WBC # FLD AUTO: 10.98 K/UL — HIGH (ref 3.8–10.5)

## 2017-03-25 PROCEDURE — 71010: CPT | Mod: 26

## 2017-03-25 RX ORDER — MORPHINE SULFATE 50 MG/1
7.5 CAPSULE, EXTENDED RELEASE ORAL EVERY 4 HOURS
Qty: 0 | Refills: 0 | Status: DISCONTINUED | OUTPATIENT
Start: 2017-03-25 | End: 2017-03-31

## 2017-03-25 RX ORDER — MORPHINE SULFATE 50 MG/1
15 CAPSULE, EXTENDED RELEASE ORAL EVERY 4 HOURS
Qty: 0 | Refills: 0 | Status: DISCONTINUED | OUTPATIENT
Start: 2017-03-25 | End: 2017-03-31

## 2017-03-25 RX ORDER — CYCLOBENZAPRINE HYDROCHLORIDE 10 MG/1
5 TABLET, FILM COATED ORAL EVERY 6 HOURS
Qty: 0 | Refills: 0 | Status: DISCONTINUED | OUTPATIENT
Start: 2017-03-25 | End: 2017-03-31

## 2017-03-25 RX ORDER — ACETAMINOPHEN 500 MG
650 TABLET ORAL EVERY 6 HOURS
Qty: 0 | Refills: 0 | Status: DISCONTINUED | OUTPATIENT
Start: 2017-03-25 | End: 2017-03-31

## 2017-03-25 RX ORDER — DEXTROSE MONOHYDRATE, SODIUM CHLORIDE, AND POTASSIUM CHLORIDE 50; .745; 4.5 G/1000ML; G/1000ML; G/1000ML
1000 INJECTION, SOLUTION INTRAVENOUS
Qty: 0 | Refills: 0 | Status: DISCONTINUED | OUTPATIENT
Start: 2017-03-25 | End: 2017-03-26

## 2017-03-25 RX ADMIN — SENNA PLUS 2 TABLET(S): 8.6 TABLET ORAL at 21:37

## 2017-03-25 RX ADMIN — MORPHINE SULFATE 7.5 MILLIGRAM(S): 50 CAPSULE, EXTENDED RELEASE ORAL at 21:38

## 2017-03-25 RX ADMIN — ENOXAPARIN SODIUM 40 MILLIGRAM(S): 100 INJECTION SUBCUTANEOUS at 21:39

## 2017-03-25 RX ADMIN — Medication 100 MILLIGRAM(S): at 21:38

## 2017-03-25 RX ADMIN — HYDROMORPHONE HYDROCHLORIDE 30 MILLILITER(S): 2 INJECTION INTRAMUSCULAR; INTRAVENOUS; SUBCUTANEOUS at 18:21

## 2017-03-25 RX ADMIN — MORPHINE SULFATE 7.5 MILLIGRAM(S): 50 CAPSULE, EXTENDED RELEASE ORAL at 22:17

## 2017-03-25 RX ADMIN — Medication 100 MILLIGRAM(S): at 05:04

## 2017-03-25 RX ADMIN — GABAPENTIN 600 MILLIGRAM(S): 400 CAPSULE ORAL at 14:17

## 2017-03-25 RX ADMIN — GABAPENTIN 600 MILLIGRAM(S): 400 CAPSULE ORAL at 21:37

## 2017-03-25 RX ADMIN — GABAPENTIN 600 MILLIGRAM(S): 400 CAPSULE ORAL at 05:05

## 2017-03-25 RX ADMIN — DEXTROSE MONOHYDRATE, SODIUM CHLORIDE, AND POTASSIUM CHLORIDE 50 MILLILITER(S): 50; .745; 4.5 INJECTION, SOLUTION INTRAVENOUS at 18:34

## 2017-03-25 RX ADMIN — Medication 100 MILLIGRAM(S): at 14:17

## 2017-03-25 RX ADMIN — MORPHINE SULFATE 7.5 MILLIGRAM(S): 50 CAPSULE, EXTENDED RELEASE ORAL at 10:14

## 2017-03-25 RX ADMIN — Medication 50 MILLIGRAM(S): at 05:05

## 2017-03-25 RX ADMIN — MORPHINE SULFATE 7.5 MILLIGRAM(S): 50 CAPSULE, EXTENDED RELEASE ORAL at 10:57

## 2017-03-25 RX ADMIN — Medication 650 MILLIGRAM(S): at 17:03

## 2017-03-25 RX ADMIN — Medication 5 MILLIGRAM(S): at 05:04

## 2017-03-26 LAB
ANION GAP SERPL CALC-SCNC: 15 MMOL/L — SIGNIFICANT CHANGE UP (ref 5–17)
BASOPHILS # BLD AUTO: 0.01 K/UL — SIGNIFICANT CHANGE UP (ref 0–0.2)
BASOPHILS NFR BLD AUTO: 0.1 % — SIGNIFICANT CHANGE UP (ref 0–2)
BUN SERPL-MCNC: 6 MG/DL — LOW (ref 7–23)
CALCIUM SERPL-MCNC: 8.5 MG/DL — SIGNIFICANT CHANGE UP (ref 8.4–10.5)
CHLORIDE SERPL-SCNC: 102 MMOL/L — SIGNIFICANT CHANGE UP (ref 96–108)
CO2 SERPL-SCNC: 20 MMOL/L — LOW (ref 22–31)
CREAT SERPL-MCNC: 0.61 MG/DL — SIGNIFICANT CHANGE UP (ref 0.5–1.3)
EOSINOPHIL # BLD AUTO: 0.01 K/UL — SIGNIFICANT CHANGE UP (ref 0–0.5)
EOSINOPHIL NFR BLD AUTO: 0.1 % — SIGNIFICANT CHANGE UP (ref 0–6)
GLUCOSE SERPL-MCNC: 87 MG/DL — SIGNIFICANT CHANGE UP (ref 70–99)
HCT VFR BLD CALC: 28 % — LOW (ref 34.5–45)
HGB BLD-MCNC: 9.5 G/DL — LOW (ref 11.5–15.5)
IMM GRANULOCYTES NFR BLD AUTO: 0.3 % — SIGNIFICANT CHANGE UP (ref 0–1.5)
LYMPHOCYTES # BLD AUTO: 1.05 K/UL — SIGNIFICANT CHANGE UP (ref 1–3.3)
LYMPHOCYTES # BLD AUTO: 9.5 % — LOW (ref 13–44)
MCHC RBC-ENTMCNC: 31.1 PG — SIGNIFICANT CHANGE UP (ref 27–34)
MCHC RBC-ENTMCNC: 33.9 GM/DL — SIGNIFICANT CHANGE UP (ref 32–36)
MCV RBC AUTO: 91.8 FL — SIGNIFICANT CHANGE UP (ref 80–100)
MONOCYTES # BLD AUTO: 0.79 K/UL — SIGNIFICANT CHANGE UP (ref 0–0.9)
MONOCYTES NFR BLD AUTO: 7.1 % — SIGNIFICANT CHANGE UP (ref 2–14)
NEUTROPHILS # BLD AUTO: 9.16 K/UL — HIGH (ref 1.8–7.4)
NEUTROPHILS NFR BLD AUTO: 82.9 % — HIGH (ref 43–77)
PLATELET # BLD AUTO: 128 K/UL — LOW (ref 150–400)
POTASSIUM SERPL-MCNC: 3.5 MMOL/L — SIGNIFICANT CHANGE UP (ref 3.5–5.3)
POTASSIUM SERPL-SCNC: 3.5 MMOL/L — SIGNIFICANT CHANGE UP (ref 3.5–5.3)
RBC # BLD: 3.05 M/UL — LOW (ref 3.8–5.2)
RBC # FLD: 13.9 % — SIGNIFICANT CHANGE UP (ref 10.3–14.5)
SODIUM SERPL-SCNC: 137 MMOL/L — SIGNIFICANT CHANGE UP (ref 135–145)
WBC # BLD: 11.05 K/UL — HIGH (ref 3.8–10.5)
WBC # FLD AUTO: 11.05 K/UL — HIGH (ref 3.8–10.5)

## 2017-03-26 RX ORDER — MORPHINE SULFATE 50 MG/1
7.5 CAPSULE, EXTENDED RELEASE ORAL ONCE
Qty: 0 | Refills: 0 | Status: DISCONTINUED | OUTPATIENT
Start: 2017-03-26 | End: 2017-03-26

## 2017-03-26 RX ORDER — ACETAMINOPHEN 500 MG
1000 TABLET ORAL ONCE
Qty: 0 | Refills: 0 | Status: COMPLETED | OUTPATIENT
Start: 2017-03-26 | End: 2017-03-26

## 2017-03-26 RX ADMIN — Medication 5 MILLIGRAM(S): at 05:34

## 2017-03-26 RX ADMIN — GABAPENTIN 600 MILLIGRAM(S): 400 CAPSULE ORAL at 13:17

## 2017-03-26 RX ADMIN — GABAPENTIN 600 MILLIGRAM(S): 400 CAPSULE ORAL at 21:18

## 2017-03-26 RX ADMIN — MORPHINE SULFATE 15 MILLIGRAM(S): 50 CAPSULE, EXTENDED RELEASE ORAL at 21:03

## 2017-03-26 RX ADMIN — MORPHINE SULFATE 7.5 MILLIGRAM(S): 50 CAPSULE, EXTENDED RELEASE ORAL at 13:40

## 2017-03-26 RX ADMIN — Medication 400 MILLIGRAM(S): at 22:37

## 2017-03-26 RX ADMIN — Medication 100 MILLIGRAM(S): at 05:34

## 2017-03-26 RX ADMIN — MORPHINE SULFATE 7.5 MILLIGRAM(S): 50 CAPSULE, EXTENDED RELEASE ORAL at 05:43

## 2017-03-26 RX ADMIN — Medication 100 MILLIGRAM(S): at 13:17

## 2017-03-26 RX ADMIN — ENOXAPARIN SODIUM 40 MILLIGRAM(S): 100 INJECTION SUBCUTANEOUS at 21:18

## 2017-03-26 RX ADMIN — Medication 650 MILLIGRAM(S): at 04:00

## 2017-03-26 RX ADMIN — SENNA PLUS 2 TABLET(S): 8.6 TABLET ORAL at 21:18

## 2017-03-26 RX ADMIN — CYCLOBENZAPRINE HYDROCHLORIDE 5 MILLIGRAM(S): 10 TABLET, FILM COATED ORAL at 22:48

## 2017-03-26 RX ADMIN — MORPHINE SULFATE 15 MILLIGRAM(S): 50 CAPSULE, EXTENDED RELEASE ORAL at 20:28

## 2017-03-26 RX ADMIN — GABAPENTIN 600 MILLIGRAM(S): 400 CAPSULE ORAL at 05:34

## 2017-03-26 RX ADMIN — MORPHINE SULFATE 7.5 MILLIGRAM(S): 50 CAPSULE, EXTENDED RELEASE ORAL at 13:46

## 2017-03-26 RX ADMIN — Medication 650 MILLIGRAM(S): at 03:32

## 2017-03-26 RX ADMIN — Medication 100 MILLIGRAM(S): at 21:18

## 2017-03-26 RX ADMIN — Medication 50 MILLIGRAM(S): at 05:34

## 2017-03-26 RX ADMIN — MORPHINE SULFATE 7.5 MILLIGRAM(S): 50 CAPSULE, EXTENDED RELEASE ORAL at 11:49

## 2017-03-26 RX ADMIN — MORPHINE SULFATE 7.5 MILLIGRAM(S): 50 CAPSULE, EXTENDED RELEASE ORAL at 06:14

## 2017-03-26 RX ADMIN — MORPHINE SULFATE 7.5 MILLIGRAM(S): 50 CAPSULE, EXTENDED RELEASE ORAL at 13:00

## 2017-03-27 LAB
ANION GAP SERPL CALC-SCNC: 11 MMOL/L — SIGNIFICANT CHANGE UP (ref 5–17)
BUN SERPL-MCNC: 7 MG/DL — SIGNIFICANT CHANGE UP (ref 7–23)
CALCIUM SERPL-MCNC: 8.7 MG/DL — SIGNIFICANT CHANGE UP (ref 8.4–10.5)
CHLORIDE SERPL-SCNC: 101 MMOL/L — SIGNIFICANT CHANGE UP (ref 96–108)
CO2 SERPL-SCNC: 26 MMOL/L — SIGNIFICANT CHANGE UP (ref 22–31)
CREAT SERPL-MCNC: 0.61 MG/DL — SIGNIFICANT CHANGE UP (ref 0.5–1.3)
GLUCOSE SERPL-MCNC: 118 MG/DL — HIGH (ref 70–99)
HCT VFR BLD CALC: 28.7 % — LOW (ref 34.5–45)
HGB BLD-MCNC: 9.4 G/DL — LOW (ref 11.5–15.5)
MCHC RBC-ENTMCNC: 30 PG — SIGNIFICANT CHANGE UP (ref 27–34)
MCHC RBC-ENTMCNC: 32.8 GM/DL — SIGNIFICANT CHANGE UP (ref 32–36)
MCV RBC AUTO: 91.7 FL — SIGNIFICANT CHANGE UP (ref 80–100)
PLATELET # BLD AUTO: 182 K/UL — SIGNIFICANT CHANGE UP (ref 150–400)
POTASSIUM SERPL-MCNC: 4 MMOL/L — SIGNIFICANT CHANGE UP (ref 3.5–5.3)
POTASSIUM SERPL-SCNC: 4 MMOL/L — SIGNIFICANT CHANGE UP (ref 3.5–5.3)
RBC # BLD: 3.13 M/UL — LOW (ref 3.8–5.2)
RBC # FLD: 14 % — SIGNIFICANT CHANGE UP (ref 10.3–14.5)
SODIUM SERPL-SCNC: 138 MMOL/L — SIGNIFICANT CHANGE UP (ref 135–145)
WBC # BLD: 9.32 K/UL — SIGNIFICANT CHANGE UP (ref 3.8–10.5)
WBC # FLD AUTO: 9.32 K/UL — SIGNIFICANT CHANGE UP (ref 3.8–10.5)

## 2017-03-27 PROCEDURE — 99223 1ST HOSP IP/OBS HIGH 75: CPT

## 2017-03-27 PROCEDURE — 93010 ELECTROCARDIOGRAM REPORT: CPT

## 2017-03-27 RX ORDER — ACETAMINOPHEN 500 MG
1000 TABLET ORAL ONCE
Qty: 0 | Refills: 0 | Status: COMPLETED | OUTPATIENT
Start: 2017-03-27 | End: 2017-03-27

## 2017-03-27 RX ORDER — SODIUM CHLORIDE 9 MG/ML
500 INJECTION INTRAMUSCULAR; INTRAVENOUS; SUBCUTANEOUS ONCE
Qty: 0 | Refills: 0 | Status: COMPLETED | OUTPATIENT
Start: 2017-03-27 | End: 2017-03-27

## 2017-03-27 RX ORDER — SODIUM CHLORIDE 9 MG/ML
1000 INJECTION INTRAMUSCULAR; INTRAVENOUS; SUBCUTANEOUS
Qty: 0 | Refills: 0 | Status: DISCONTINUED | OUTPATIENT
Start: 2017-03-27 | End: 2017-03-28

## 2017-03-27 RX ADMIN — GABAPENTIN 600 MILLIGRAM(S): 400 CAPSULE ORAL at 05:13

## 2017-03-27 RX ADMIN — MORPHINE SULFATE 7.5 MILLIGRAM(S): 50 CAPSULE, EXTENDED RELEASE ORAL at 22:15

## 2017-03-27 RX ADMIN — Medication 100 MILLIGRAM(S): at 21:43

## 2017-03-27 RX ADMIN — Medication 50 MILLIGRAM(S): at 05:13

## 2017-03-27 RX ADMIN — MORPHINE SULFATE 15 MILLIGRAM(S): 50 CAPSULE, EXTENDED RELEASE ORAL at 18:34

## 2017-03-27 RX ADMIN — MORPHINE SULFATE 7.5 MILLIGRAM(S): 50 CAPSULE, EXTENDED RELEASE ORAL at 21:43

## 2017-03-27 RX ADMIN — MORPHINE SULFATE 15 MILLIGRAM(S): 50 CAPSULE, EXTENDED RELEASE ORAL at 10:10

## 2017-03-27 RX ADMIN — SENNA PLUS 2 TABLET(S): 8.6 TABLET ORAL at 21:43

## 2017-03-27 RX ADMIN — ENOXAPARIN SODIUM 40 MILLIGRAM(S): 100 INJECTION SUBCUTANEOUS at 21:43

## 2017-03-27 RX ADMIN — MORPHINE SULFATE 15 MILLIGRAM(S): 50 CAPSULE, EXTENDED RELEASE ORAL at 06:04

## 2017-03-27 RX ADMIN — Medication 1000 MILLIGRAM(S): at 12:37

## 2017-03-27 RX ADMIN — CYCLOBENZAPRINE HYDROCHLORIDE 5 MILLIGRAM(S): 10 TABLET, FILM COATED ORAL at 06:04

## 2017-03-27 RX ADMIN — SODIUM CHLORIDE 1000 MILLILITER(S): 9 INJECTION INTRAMUSCULAR; INTRAVENOUS; SUBCUTANEOUS at 11:37

## 2017-03-27 RX ADMIN — GABAPENTIN 600 MILLIGRAM(S): 400 CAPSULE ORAL at 13:29

## 2017-03-27 RX ADMIN — Medication 5 MILLIGRAM(S): at 05:13

## 2017-03-27 RX ADMIN — Medication 400 MILLIGRAM(S): at 11:35

## 2017-03-27 RX ADMIN — GABAPENTIN 600 MILLIGRAM(S): 400 CAPSULE ORAL at 21:43

## 2017-03-27 RX ADMIN — Medication 100 MILLIGRAM(S): at 13:29

## 2017-03-27 RX ADMIN — Medication 100 MILLIGRAM(S): at 05:13

## 2017-03-28 LAB
ANION GAP SERPL CALC-SCNC: 11 MMOL/L — SIGNIFICANT CHANGE UP (ref 5–17)
BASOPHILS # BLD AUTO: 0.01 K/UL — SIGNIFICANT CHANGE UP (ref 0–0.2)
BASOPHILS NFR BLD AUTO: 0.2 % — SIGNIFICANT CHANGE UP (ref 0–2)
BLD GP AB SCN SERPL QL: NEGATIVE — SIGNIFICANT CHANGE UP
BUN SERPL-MCNC: 7 MG/DL — SIGNIFICANT CHANGE UP (ref 7–23)
CALCIUM SERPL-MCNC: 8.3 MG/DL — LOW (ref 8.4–10.5)
CHLORIDE SERPL-SCNC: 104 MMOL/L — SIGNIFICANT CHANGE UP (ref 96–108)
CK MB BLD-MCNC: 1.4 % — SIGNIFICANT CHANGE UP (ref 0–3.5)
CK MB CFR SERPL CALC: 1.9 NG/ML — SIGNIFICANT CHANGE UP (ref 0–3.8)
CK SERPL-CCNC: 138 U/L — SIGNIFICANT CHANGE UP (ref 25–170)
CO2 SERPL-SCNC: 27 MMOL/L — SIGNIFICANT CHANGE UP (ref 22–31)
CREAT SERPL-MCNC: 0.61 MG/DL — SIGNIFICANT CHANGE UP (ref 0.5–1.3)
EOSINOPHIL # BLD AUTO: 0.16 K/UL — SIGNIFICANT CHANGE UP (ref 0–0.5)
EOSINOPHIL NFR BLD AUTO: 2.5 % — SIGNIFICANT CHANGE UP (ref 0–6)
GLUCOSE SERPL-MCNC: 100 MG/DL — HIGH (ref 70–99)
HCT VFR BLD CALC: 26 % — LOW (ref 34.5–45)
HGB BLD-MCNC: 8.6 G/DL — LOW (ref 11.5–15.5)
IMM GRANULOCYTES NFR BLD AUTO: 0.2 % — SIGNIFICANT CHANGE UP (ref 0–1.5)
LYMPHOCYTES # BLD AUTO: 1.24 K/UL — SIGNIFICANT CHANGE UP (ref 1–3.3)
LYMPHOCYTES # BLD AUTO: 19.5 % — SIGNIFICANT CHANGE UP (ref 13–44)
MCHC RBC-ENTMCNC: 30.9 PG — SIGNIFICANT CHANGE UP (ref 27–34)
MCHC RBC-ENTMCNC: 33.1 GM/DL — SIGNIFICANT CHANGE UP (ref 32–36)
MCV RBC AUTO: 93.5 FL — SIGNIFICANT CHANGE UP (ref 80–100)
MONOCYTES # BLD AUTO: 0.51 K/UL — SIGNIFICANT CHANGE UP (ref 0–0.9)
MONOCYTES NFR BLD AUTO: 8 % — SIGNIFICANT CHANGE UP (ref 2–14)
NEUTROPHILS # BLD AUTO: 4.44 K/UL — SIGNIFICANT CHANGE UP (ref 1.8–7.4)
NEUTROPHILS NFR BLD AUTO: 69.6 % — SIGNIFICANT CHANGE UP (ref 43–77)
PLATELET # BLD AUTO: 170 K/UL — SIGNIFICANT CHANGE UP (ref 150–400)
POTASSIUM SERPL-MCNC: 4.1 MMOL/L — SIGNIFICANT CHANGE UP (ref 3.5–5.3)
POTASSIUM SERPL-SCNC: 4.1 MMOL/L — SIGNIFICANT CHANGE UP (ref 3.5–5.3)
RBC # BLD: 2.78 M/UL — LOW (ref 3.8–5.2)
RBC # FLD: 13.8 % — SIGNIFICANT CHANGE UP (ref 10.3–14.5)
RH IG SCN BLD-IMP: NEGATIVE — SIGNIFICANT CHANGE UP
SODIUM SERPL-SCNC: 142 MMOL/L — SIGNIFICANT CHANGE UP (ref 135–145)
TROPONIN T SERPL-MCNC: <0.01 NG/ML — SIGNIFICANT CHANGE UP (ref 0–0.06)
WBC # BLD: 6.37 K/UL — SIGNIFICANT CHANGE UP (ref 3.8–10.5)
WBC # FLD AUTO: 6.37 K/UL — SIGNIFICANT CHANGE UP (ref 3.8–10.5)

## 2017-03-28 PROCEDURE — 71275 CT ANGIOGRAPHY CHEST: CPT | Mod: 26

## 2017-03-28 PROCEDURE — 99233 SBSQ HOSP IP/OBS HIGH 50: CPT

## 2017-03-28 PROCEDURE — 93970 EXTREMITY STUDY: CPT | Mod: 26

## 2017-03-28 PROCEDURE — 71010: CPT | Mod: 26

## 2017-03-28 PROCEDURE — 93306 TTE W/DOPPLER COMPLETE: CPT | Mod: 26

## 2017-03-28 PROCEDURE — 78582 LUNG VENTILAT&PERFUS IMAGING: CPT | Mod: 26

## 2017-03-28 RX ORDER — HYDROMORPHONE HYDROCHLORIDE 2 MG/ML
0.5 INJECTION INTRAMUSCULAR; INTRAVENOUS; SUBCUTANEOUS ONCE
Qty: 0 | Refills: 0 | Status: DISCONTINUED | OUTPATIENT
Start: 2017-03-28 | End: 2017-03-28

## 2017-03-28 RX ORDER — SODIUM CHLORIDE 9 MG/ML
1000 INJECTION INTRAMUSCULAR; INTRAVENOUS; SUBCUTANEOUS
Qty: 0 | Refills: 0 | Status: DISCONTINUED | OUTPATIENT
Start: 2017-03-28 | End: 2017-03-29

## 2017-03-28 RX ORDER — SODIUM CHLORIDE 9 MG/ML
500 INJECTION INTRAMUSCULAR; INTRAVENOUS; SUBCUTANEOUS ONCE
Qty: 0 | Refills: 0 | Status: COMPLETED | OUTPATIENT
Start: 2017-03-28 | End: 2017-03-28

## 2017-03-28 RX ADMIN — GABAPENTIN 600 MILLIGRAM(S): 400 CAPSULE ORAL at 05:48

## 2017-03-28 RX ADMIN — Medication 100 MILLIGRAM(S): at 21:42

## 2017-03-28 RX ADMIN — MORPHINE SULFATE 15 MILLIGRAM(S): 50 CAPSULE, EXTENDED RELEASE ORAL at 15:18

## 2017-03-28 RX ADMIN — SODIUM CHLORIDE 1000 MILLILITER(S): 9 INJECTION INTRAMUSCULAR; INTRAVENOUS; SUBCUTANEOUS at 18:10

## 2017-03-28 RX ADMIN — SODIUM CHLORIDE 50 MILLILITER(S): 9 INJECTION INTRAMUSCULAR; INTRAVENOUS; SUBCUTANEOUS at 01:10

## 2017-03-28 RX ADMIN — CYCLOBENZAPRINE HYDROCHLORIDE 5 MILLIGRAM(S): 10 TABLET, FILM COATED ORAL at 18:38

## 2017-03-28 RX ADMIN — CYCLOBENZAPRINE HYDROCHLORIDE 5 MILLIGRAM(S): 10 TABLET, FILM COATED ORAL at 12:22

## 2017-03-28 RX ADMIN — Medication 100 MILLIGRAM(S): at 05:48

## 2017-03-28 RX ADMIN — MORPHINE SULFATE 15 MILLIGRAM(S): 50 CAPSULE, EXTENDED RELEASE ORAL at 02:21

## 2017-03-28 RX ADMIN — ENOXAPARIN SODIUM 40 MILLIGRAM(S): 100 INJECTION SUBCUTANEOUS at 21:42

## 2017-03-28 RX ADMIN — GABAPENTIN 600 MILLIGRAM(S): 400 CAPSULE ORAL at 13:57

## 2017-03-28 RX ADMIN — CYCLOBENZAPRINE HYDROCHLORIDE 5 MILLIGRAM(S): 10 TABLET, FILM COATED ORAL at 03:11

## 2017-03-28 RX ADMIN — MORPHINE SULFATE 15 MILLIGRAM(S): 50 CAPSULE, EXTENDED RELEASE ORAL at 14:48

## 2017-03-28 RX ADMIN — SENNA PLUS 2 TABLET(S): 8.6 TABLET ORAL at 21:42

## 2017-03-28 RX ADMIN — Medication 100 MILLIGRAM(S): at 13:57

## 2017-03-28 RX ADMIN — HYDROMORPHONE HYDROCHLORIDE 0.5 MILLIGRAM(S): 2 INJECTION INTRAMUSCULAR; INTRAVENOUS; SUBCUTANEOUS at 20:00

## 2017-03-28 RX ADMIN — HYDROMORPHONE HYDROCHLORIDE 0.5 MILLIGRAM(S): 2 INJECTION INTRAMUSCULAR; INTRAVENOUS; SUBCUTANEOUS at 19:44

## 2017-03-28 RX ADMIN — MORPHINE SULFATE 15 MILLIGRAM(S): 50 CAPSULE, EXTENDED RELEASE ORAL at 03:00

## 2017-03-28 RX ADMIN — GABAPENTIN 600 MILLIGRAM(S): 400 CAPSULE ORAL at 21:42

## 2017-03-29 LAB
ANION GAP SERPL CALC-SCNC: 16 MMOL/L — SIGNIFICANT CHANGE UP (ref 5–17)
BUN SERPL-MCNC: 8 MG/DL — SIGNIFICANT CHANGE UP (ref 7–23)
CALCIUM SERPL-MCNC: 8.7 MG/DL — SIGNIFICANT CHANGE UP (ref 8.4–10.5)
CHLORIDE SERPL-SCNC: 100 MMOL/L — SIGNIFICANT CHANGE UP (ref 96–108)
CO2 SERPL-SCNC: 24 MMOL/L — SIGNIFICANT CHANGE UP (ref 22–31)
CREAT SERPL-MCNC: 0.58 MG/DL — SIGNIFICANT CHANGE UP (ref 0.5–1.3)
GLUCOSE SERPL-MCNC: 110 MG/DL — HIGH (ref 70–99)
HCT VFR BLD CALC: 26.5 % — LOW (ref 34.5–45)
HGB BLD-MCNC: 8.7 G/DL — LOW (ref 11.5–15.5)
IRON SATN MFR SERPL: 20 % — SIGNIFICANT CHANGE UP (ref 14–50)
IRON SATN MFR SERPL: 27 UG/DL — LOW (ref 30–160)
MCHC RBC-ENTMCNC: 30.7 PG — SIGNIFICANT CHANGE UP (ref 27–34)
MCHC RBC-ENTMCNC: 32.8 GM/DL — SIGNIFICANT CHANGE UP (ref 32–36)
MCV RBC AUTO: 93.6 FL — SIGNIFICANT CHANGE UP (ref 80–100)
PLATELET # BLD AUTO: 196 K/UL — SIGNIFICANT CHANGE UP (ref 150–400)
POTASSIUM SERPL-MCNC: 4.1 MMOL/L — SIGNIFICANT CHANGE UP (ref 3.5–5.3)
POTASSIUM SERPL-SCNC: 4.1 MMOL/L — SIGNIFICANT CHANGE UP (ref 3.5–5.3)
RBC # BLD: 2.83 M/UL — LOW (ref 3.8–5.2)
RBC # BLD: 2.83 M/UL — LOW (ref 3.8–5.2)
RBC # FLD: 13.7 % — SIGNIFICANT CHANGE UP (ref 10.3–14.5)
RETICS #: 62.5 K/UL — SIGNIFICANT CHANGE UP (ref 25–125)
RETICS/RBC NFR: 2.2 % — SIGNIFICANT CHANGE UP (ref 0.5–2.5)
SODIUM SERPL-SCNC: 140 MMOL/L — SIGNIFICANT CHANGE UP (ref 135–145)
TIBC SERPL-MCNC: 134 UG/DL — LOW (ref 220–430)
TROPONIN T SERPL-MCNC: <0.01 NG/ML — SIGNIFICANT CHANGE UP (ref 0–0.06)
UIBC SERPL-MCNC: 107 UG/DL — LOW (ref 110–370)
WBC # BLD: 6.25 K/UL — SIGNIFICANT CHANGE UP (ref 3.8–10.5)
WBC # FLD AUTO: 6.25 K/UL — SIGNIFICANT CHANGE UP (ref 3.8–10.5)

## 2017-03-29 PROCEDURE — 99233 SBSQ HOSP IP/OBS HIGH 50: CPT

## 2017-03-29 RX ORDER — SODIUM CHLORIDE 9 MG/ML
1000 INJECTION INTRAMUSCULAR; INTRAVENOUS; SUBCUTANEOUS
Qty: 0 | Refills: 0 | Status: DISCONTINUED | OUTPATIENT
Start: 2017-03-29 | End: 2017-03-31

## 2017-03-29 RX ORDER — METOPROLOL TARTRATE 50 MG
25 TABLET ORAL
Qty: 0 | Refills: 0 | Status: DISCONTINUED | OUTPATIENT
Start: 2017-03-29 | End: 2017-03-31

## 2017-03-29 RX ADMIN — Medication 650 MILLIGRAM(S): at 16:24

## 2017-03-29 RX ADMIN — SENNA PLUS 2 TABLET(S): 8.6 TABLET ORAL at 21:05

## 2017-03-29 RX ADMIN — GABAPENTIN 600 MILLIGRAM(S): 400 CAPSULE ORAL at 23:16

## 2017-03-29 RX ADMIN — MORPHINE SULFATE 15 MILLIGRAM(S): 50 CAPSULE, EXTENDED RELEASE ORAL at 10:00

## 2017-03-29 RX ADMIN — MORPHINE SULFATE 15 MILLIGRAM(S): 50 CAPSULE, EXTENDED RELEASE ORAL at 21:28

## 2017-03-29 RX ADMIN — MORPHINE SULFATE 15 MILLIGRAM(S): 50 CAPSULE, EXTENDED RELEASE ORAL at 09:29

## 2017-03-29 RX ADMIN — Medication 100 MILLIGRAM(S): at 21:05

## 2017-03-29 RX ADMIN — GABAPENTIN 600 MILLIGRAM(S): 400 CAPSULE ORAL at 16:23

## 2017-03-29 RX ADMIN — Medication 650 MILLIGRAM(S): at 17:00

## 2017-03-29 RX ADMIN — CYCLOBENZAPRINE HYDROCHLORIDE 5 MILLIGRAM(S): 10 TABLET, FILM COATED ORAL at 05:11

## 2017-03-29 RX ADMIN — CYCLOBENZAPRINE HYDROCHLORIDE 5 MILLIGRAM(S): 10 TABLET, FILM COATED ORAL at 12:05

## 2017-03-29 RX ADMIN — ENOXAPARIN SODIUM 40 MILLIGRAM(S): 100 INJECTION SUBCUTANEOUS at 21:05

## 2017-03-29 RX ADMIN — Medication 650 MILLIGRAM(S): at 05:13

## 2017-03-29 RX ADMIN — MORPHINE SULFATE 15 MILLIGRAM(S): 50 CAPSULE, EXTENDED RELEASE ORAL at 20:58

## 2017-03-29 RX ADMIN — Medication 100 MILLIGRAM(S): at 05:10

## 2017-03-29 RX ADMIN — Medication 25 MILLIGRAM(S): at 17:32

## 2017-03-29 RX ADMIN — GABAPENTIN 600 MILLIGRAM(S): 400 CAPSULE ORAL at 05:10

## 2017-03-29 RX ADMIN — Medication 100 MILLIGRAM(S): at 16:23

## 2017-03-30 LAB
ANION GAP SERPL CALC-SCNC: 13 MMOL/L — SIGNIFICANT CHANGE UP (ref 5–17)
APPEARANCE UR: CLEAR — SIGNIFICANT CHANGE UP
BILIRUB UR-MCNC: NEGATIVE — SIGNIFICANT CHANGE UP
BUN SERPL-MCNC: 8 MG/DL — SIGNIFICANT CHANGE UP (ref 7–23)
CALCIUM SERPL-MCNC: 8.7 MG/DL — SIGNIFICANT CHANGE UP (ref 8.4–10.5)
CHLORIDE SERPL-SCNC: 107 MMOL/L — SIGNIFICANT CHANGE UP (ref 96–108)
CO2 SERPL-SCNC: 25 MMOL/L — SIGNIFICANT CHANGE UP (ref 22–31)
COLOR SPEC: YELLOW — SIGNIFICANT CHANGE UP
CREAT SERPL-MCNC: 0.53 MG/DL — SIGNIFICANT CHANGE UP (ref 0.5–1.3)
DIFF PNL FLD: NEGATIVE — SIGNIFICANT CHANGE UP
GLUCOSE SERPL-MCNC: 103 MG/DL — HIGH (ref 70–99)
GLUCOSE UR QL: NEGATIVE MG/DL — SIGNIFICANT CHANGE UP
HCT VFR BLD CALC: 26.4 % — LOW (ref 34.5–45)
HGB BLD-MCNC: 8.9 G/DL — LOW (ref 11.5–15.5)
KETONES UR-MCNC: NEGATIVE — SIGNIFICANT CHANGE UP
LEUKOCYTE ESTERASE UR-ACNC: NEGATIVE — SIGNIFICANT CHANGE UP
MCHC RBC-ENTMCNC: 32.4 PG — SIGNIFICANT CHANGE UP (ref 27–34)
MCHC RBC-ENTMCNC: 33.6 GM/DL — SIGNIFICANT CHANGE UP (ref 32–36)
MCV RBC AUTO: 96.3 FL — SIGNIFICANT CHANGE UP (ref 80–100)
NITRITE UR-MCNC: NEGATIVE — SIGNIFICANT CHANGE UP
PH UR: 6 — SIGNIFICANT CHANGE UP (ref 5–8)
PLATELET # BLD AUTO: 196 K/UL — SIGNIFICANT CHANGE UP (ref 150–400)
POTASSIUM SERPL-MCNC: 3.8 MMOL/L — SIGNIFICANT CHANGE UP (ref 3.5–5.3)
POTASSIUM SERPL-SCNC: 3.8 MMOL/L — SIGNIFICANT CHANGE UP (ref 3.5–5.3)
PROT UR-MCNC: NEGATIVE MG/DL — SIGNIFICANT CHANGE UP
RBC # BLD: 2.75 M/UL — LOW (ref 3.8–5.2)
RBC # FLD: 12.4 % — SIGNIFICANT CHANGE UP (ref 10.3–14.5)
SODIUM SERPL-SCNC: 145 MMOL/L — SIGNIFICANT CHANGE UP (ref 135–145)
SP GR SPEC: 1.01 — SIGNIFICANT CHANGE UP (ref 1.01–1.02)
UROBILINOGEN FLD QL: NEGATIVE MG/DL — SIGNIFICANT CHANGE UP
WBC # BLD: 4.7 K/UL — SIGNIFICANT CHANGE UP (ref 3.8–10.5)
WBC # FLD AUTO: 4.7 K/UL — SIGNIFICANT CHANGE UP (ref 3.8–10.5)

## 2017-03-30 PROCEDURE — 99233 SBSQ HOSP IP/OBS HIGH 50: CPT

## 2017-03-30 RX ADMIN — Medication 100 MILLIGRAM(S): at 21:42

## 2017-03-30 RX ADMIN — CYCLOBENZAPRINE HYDROCHLORIDE 5 MILLIGRAM(S): 10 TABLET, FILM COATED ORAL at 15:20

## 2017-03-30 RX ADMIN — Medication 650 MILLIGRAM(S): at 04:12

## 2017-03-30 RX ADMIN — SODIUM CHLORIDE 100 MILLILITER(S): 9 INJECTION INTRAMUSCULAR; INTRAVENOUS; SUBCUTANEOUS at 02:35

## 2017-03-30 RX ADMIN — CYCLOBENZAPRINE HYDROCHLORIDE 5 MILLIGRAM(S): 10 TABLET, FILM COATED ORAL at 21:42

## 2017-03-30 RX ADMIN — GABAPENTIN 600 MILLIGRAM(S): 400 CAPSULE ORAL at 15:20

## 2017-03-30 RX ADMIN — Medication 100 MILLIGRAM(S): at 05:13

## 2017-03-30 RX ADMIN — Medication 25 MILLIGRAM(S): at 05:13

## 2017-03-30 RX ADMIN — GABAPENTIN 600 MILLIGRAM(S): 400 CAPSULE ORAL at 21:42

## 2017-03-30 RX ADMIN — MORPHINE SULFATE 15 MILLIGRAM(S): 50 CAPSULE, EXTENDED RELEASE ORAL at 04:09

## 2017-03-30 RX ADMIN — ENOXAPARIN SODIUM 40 MILLIGRAM(S): 100 INJECTION SUBCUTANEOUS at 21:42

## 2017-03-30 RX ADMIN — Medication 25 MILLIGRAM(S): at 17:38

## 2017-03-30 RX ADMIN — CYCLOBENZAPRINE HYDROCHLORIDE 5 MILLIGRAM(S): 10 TABLET, FILM COATED ORAL at 01:59

## 2017-03-30 RX ADMIN — MORPHINE SULFATE 15 MILLIGRAM(S): 50 CAPSULE, EXTENDED RELEASE ORAL at 04:40

## 2017-03-30 RX ADMIN — SODIUM CHLORIDE 100 MILLILITER(S): 9 INJECTION INTRAMUSCULAR; INTRAVENOUS; SUBCUTANEOUS at 21:43

## 2017-03-30 RX ADMIN — GABAPENTIN 600 MILLIGRAM(S): 400 CAPSULE ORAL at 05:13

## 2017-03-30 RX ADMIN — SENNA PLUS 2 TABLET(S): 8.6 TABLET ORAL at 21:42

## 2017-03-31 ENCOUNTER — TRANSCRIPTION ENCOUNTER (OUTPATIENT)
Age: 62
End: 2017-03-31

## 2017-03-31 VITALS
TEMPERATURE: 99 F | SYSTOLIC BLOOD PRESSURE: 121 MMHG | HEART RATE: 107 BPM | OXYGEN SATURATION: 96 % | RESPIRATION RATE: 16 BRPM | DIASTOLIC BLOOD PRESSURE: 81 MMHG

## 2017-03-31 LAB
CULTURE RESULTS: SIGNIFICANT CHANGE UP
SPECIMEN SOURCE: SIGNIFICANT CHANGE UP

## 2017-03-31 PROCEDURE — A9567: CPT

## 2017-03-31 PROCEDURE — 87086 URINE CULTURE/COLONY COUNT: CPT

## 2017-03-31 PROCEDURE — 71045 X-RAY EXAM CHEST 1 VIEW: CPT

## 2017-03-31 PROCEDURE — 85027 COMPLETE CBC AUTOMATED: CPT

## 2017-03-31 PROCEDURE — 97162 PT EVAL MOD COMPLEX 30 MIN: CPT

## 2017-03-31 PROCEDURE — 81001 URINALYSIS AUTO W/SCOPE: CPT

## 2017-03-31 PROCEDURE — 93970 EXTREMITY STUDY: CPT

## 2017-03-31 PROCEDURE — 78582 LUNG VENTILAT&PERFUS IMAGING: CPT

## 2017-03-31 PROCEDURE — 80048 BASIC METABOLIC PNL TOTAL CA: CPT

## 2017-03-31 PROCEDURE — 93005 ELECTROCARDIOGRAM TRACING: CPT

## 2017-03-31 PROCEDURE — 82565 ASSAY OF CREATININE: CPT

## 2017-03-31 PROCEDURE — 84132 ASSAY OF SERUM POTASSIUM: CPT

## 2017-03-31 PROCEDURE — P9040: CPT

## 2017-03-31 PROCEDURE — 76000 FLUOROSCOPY <1 HR PHYS/QHP: CPT

## 2017-03-31 PROCEDURE — 86923 COMPATIBILITY TEST ELECTRIC: CPT

## 2017-03-31 PROCEDURE — 94640 AIRWAY INHALATION TREATMENT: CPT

## 2017-03-31 PROCEDURE — 82947 ASSAY GLUCOSE BLOOD QUANT: CPT

## 2017-03-31 PROCEDURE — P9016: CPT

## 2017-03-31 PROCEDURE — 85045 AUTOMATED RETICULOCYTE COUNT: CPT

## 2017-03-31 PROCEDURE — C1889: CPT

## 2017-03-31 PROCEDURE — 36430 TRANSFUSION BLD/BLD COMPNT: CPT

## 2017-03-31 PROCEDURE — 84295 ASSAY OF SERUM SODIUM: CPT

## 2017-03-31 PROCEDURE — 97110 THERAPEUTIC EXERCISES: CPT

## 2017-03-31 PROCEDURE — 82550 ASSAY OF CK (CPK): CPT

## 2017-03-31 PROCEDURE — 83605 ASSAY OF LACTIC ACID: CPT

## 2017-03-31 PROCEDURE — 86900 BLOOD TYPING SEROLOGIC ABO: CPT

## 2017-03-31 PROCEDURE — 93306 TTE W/DOPPLER COMPLETE: CPT

## 2017-03-31 PROCEDURE — 82553 CREATINE MB FRACTION: CPT

## 2017-03-31 PROCEDURE — 97530 THERAPEUTIC ACTIVITIES: CPT

## 2017-03-31 PROCEDURE — 82803 BLOOD GASES ANY COMBINATION: CPT

## 2017-03-31 PROCEDURE — 97112 NEUROMUSCULAR REEDUCATION: CPT

## 2017-03-31 PROCEDURE — 86901 BLOOD TYPING SEROLOGIC RH(D): CPT

## 2017-03-31 PROCEDURE — 71275 CT ANGIOGRAPHY CHEST: CPT

## 2017-03-31 PROCEDURE — 84484 ASSAY OF TROPONIN QUANT: CPT

## 2017-03-31 PROCEDURE — 86850 RBC ANTIBODY SCREEN: CPT

## 2017-03-31 PROCEDURE — 99233 SBSQ HOSP IP/OBS HIGH 50: CPT

## 2017-03-31 PROCEDURE — 81003 URINALYSIS AUTO W/O SCOPE: CPT

## 2017-03-31 PROCEDURE — 85014 HEMATOCRIT: CPT

## 2017-03-31 PROCEDURE — 82435 ASSAY OF BLOOD CHLORIDE: CPT

## 2017-03-31 PROCEDURE — 97116 GAIT TRAINING THERAPY: CPT

## 2017-03-31 PROCEDURE — 82330 ASSAY OF CALCIUM: CPT

## 2017-03-31 PROCEDURE — A9540: CPT

## 2017-03-31 PROCEDURE — 83550 IRON BINDING TEST: CPT

## 2017-03-31 PROCEDURE — C1713: CPT

## 2017-03-31 RX ORDER — METOPROLOL TARTRATE 50 MG
50 TABLET ORAL DAILY
Qty: 0 | Refills: 0 | Status: DISCONTINUED | OUTPATIENT
Start: 2017-03-31 | End: 2017-03-31

## 2017-03-31 RX ORDER — MORPHINE SULFATE 50 MG/1
1 CAPSULE, EXTENDED RELEASE ORAL
Qty: 0 | Refills: 0 | DISCHARGE
Start: 2017-03-31

## 2017-03-31 RX ORDER — ACETAMINOPHEN 500 MG
2 TABLET ORAL
Qty: 0 | Refills: 0 | DISCHARGE
Start: 2017-03-31

## 2017-03-31 RX ORDER — MORPHINE SULFATE 50 MG/1
7.5 CAPSULE, EXTENDED RELEASE ORAL
Qty: 0 | Refills: 0 | DISCHARGE
Start: 2017-03-31

## 2017-03-31 RX ORDER — DOCUSATE SODIUM 100 MG
1 CAPSULE ORAL
Qty: 0 | Refills: 0 | DISCHARGE
Start: 2017-03-31

## 2017-03-31 RX ORDER — GABAPENTIN 400 MG/1
1 CAPSULE ORAL
Qty: 0 | Refills: 0 | COMMUNITY

## 2017-03-31 RX ORDER — GABAPENTIN 400 MG/1
1 CAPSULE ORAL
Qty: 0 | Refills: 0 | DISCHARGE
Start: 2017-03-31

## 2017-03-31 RX ORDER — CYCLOBENZAPRINE HYDROCHLORIDE 10 MG/1
1 TABLET, FILM COATED ORAL
Qty: 0 | Refills: 0 | DISCHARGE
Start: 2017-03-31

## 2017-03-31 RX ORDER — ENOXAPARIN SODIUM 100 MG/ML
40 INJECTION SUBCUTANEOUS
Qty: 0 | Refills: 0 | DISCHARGE
Start: 2017-03-31

## 2017-03-31 RX ORDER — SENNA PLUS 8.6 MG/1
2 TABLET ORAL
Qty: 0 | Refills: 0 | DISCHARGE
Start: 2017-03-31

## 2017-03-31 RX ADMIN — MORPHINE SULFATE 15 MILLIGRAM(S): 50 CAPSULE, EXTENDED RELEASE ORAL at 06:07

## 2017-03-31 RX ADMIN — GABAPENTIN 600 MILLIGRAM(S): 400 CAPSULE ORAL at 14:06

## 2017-03-31 RX ADMIN — MORPHINE SULFATE 15 MILLIGRAM(S): 50 CAPSULE, EXTENDED RELEASE ORAL at 14:10

## 2017-03-31 RX ADMIN — CYCLOBENZAPRINE HYDROCHLORIDE 5 MILLIGRAM(S): 10 TABLET, FILM COATED ORAL at 11:48

## 2017-03-31 RX ADMIN — MORPHINE SULFATE 15 MILLIGRAM(S): 50 CAPSULE, EXTENDED RELEASE ORAL at 01:02

## 2017-03-31 RX ADMIN — MORPHINE SULFATE 15 MILLIGRAM(S): 50 CAPSULE, EXTENDED RELEASE ORAL at 01:32

## 2017-03-31 RX ADMIN — Medication 100 MILLIGRAM(S): at 14:07

## 2017-03-31 RX ADMIN — GABAPENTIN 600 MILLIGRAM(S): 400 CAPSULE ORAL at 05:37

## 2017-03-31 RX ADMIN — Medication 25 MILLIGRAM(S): at 05:37

## 2017-03-31 RX ADMIN — Medication 100 MILLIGRAM(S): at 05:37

## 2017-03-31 RX ADMIN — Medication 50 MILLIGRAM(S): at 11:57

## 2017-03-31 RX ADMIN — MORPHINE SULFATE 15 MILLIGRAM(S): 50 CAPSULE, EXTENDED RELEASE ORAL at 05:37

## 2017-03-31 NOTE — DISCHARGE NOTE ADULT - PLAN OF CARE
status post L4-L5 posterior lumbar interbody fusion on 3/23/2017 cardiac diet  No strenuous activity.  No lifting.  Do not return to work until cleared to do so by physician.  No driving until cleared to do so by physician.   keep wound clean and dry. do not soak in water. pat dry. do not apply cream/moisture to incision.  items for follow up: wound check  follow up with neurosurgery, Dr. Mullins, within 1 week of discharge from rehab blood pressure control continue toprol xl, hold enalapril for now  follow up with cardiology, Dr. Matias, within 1 week of discharge from rehab  follow up with PMD within 1 week of discharge from rehab

## 2017-03-31 NOTE — DISCHARGE NOTE ADULT - MEDICATION SUMMARY - MEDICATIONS TO STOP TAKING
I will STOP taking the medications listed below when I get home from the hospital:    enalapril 5 mg oral tablet  -- 1 tab(s) by mouth once a day    Zantac GELdose 150 mg oral capsule  -- 2 cap(s) by mouth once a day (at bedtime)

## 2017-03-31 NOTE — DISCHARGE NOTE ADULT - CARE PLAN
Principal Discharge DX:	Lumbar radiculopathy  Goal:	status post L4-L5 posterior lumbar interbody fusion on 3/23/2017  Instructions for follow-up, activity and diet:	cardiac diet  No strenuous activity.  No lifting.  Do not return to work until cleared to do so by physician.  No driving until cleared to do so by physician.   keep wound clean and dry. do not soak in water. pat dry. do not apply cream/moisture to incision.  items for follow up: wound check  follow up with neurosurgery, Dr. Mullins, within 1 week of discharge from rehab  Secondary Diagnosis:	HTN (hypertension)  Goal:	blood pressure control  Instructions for follow-up, activity and diet:	continue toprol xl, hold enalapril for now  follow up with cardiology, Dr. Matias, within 1 week of discharge from rehab  follow up with PMD within 1 week of discharge from rehab

## 2017-03-31 NOTE — DISCHARGE NOTE ADULT - CARE PROVIDERS DIRECT ADDRESSES
[email or web address],DirectAddress_Unknown,DirectAddress_Unknown

## 2017-03-31 NOTE — DISCHARGE NOTE ADULT - NS AS DC STROKE ED MATERIALS
Risk Factors for Stroke/Prescribed Medications/Stroke Warning Signs and Symptoms/Stroke Education Booklet/Need for Followup After Discharge/Call 911 for Stroke

## 2017-03-31 NOTE — DISCHARGE NOTE ADULT - CARE PROVIDER_API CALL
Jamie Mullins), Neurological Surgery  [address]  [place] [address]  Phone: [phone]  Fax: [phone]    Graham Matias), Cardiovascular Disease; Internal Medicine  [address] [place] [address]  Phone: [phone]  Fax: [phone]

## 2017-03-31 NOTE — DISCHARGE NOTE ADULT - PATIENT PORTAL LINK FT
“You can access the Follow[facility] Patient Portal, offered by [facility], by registering with the following website: [email or web address]”

## 2017-03-31 NOTE — DISCHARGE NOTE ADULT - NS AS ACTIVITY OBS
Do not drive or operate machinery/Walking-Indoors allowed/No Heavy lifting/straining/Do not make important decisions

## 2017-03-31 NOTE — DISCHARGE NOTE ADULT - HOSPITAL COURSE
Patient is a 61 year old female who presented with back pain and right lower extremity weakness.  Underwent L4-L5 posterior lumbar interbody fusion on 3/23/2017.  Tolerated the procedure well with no immediate complications.  Admitted to the neurosurgery service for routine post-op care.  Morphine used for pain control.  Cardiology following.  Found to have a right soleal DVT on duplex on 3/28/2017.  CTA chest done to rule out PE which was negative.  Had orthostatic hypotension which has now resolved.  Physical therapy evaluated patient adn recommended subacute rehab upon discharge.  Cleared for discharge by hospitalist, cardiology (Dr. Matias), and neurosurgery (Dr. Mullins).  PATIENT MUST HAVE SERIAL LOWER EXTREMITY DUPLEX DONE TO RULE OUT PROPOGATION OF DVT.  NEXT DUPLEX IS DUE ON 4/4/2017. Patient is a 61 year old female who presented with back pain and right lower extremity weakness.  Underwent L4-L5 posterior lumbar interbody fusion on 3/23/2017.  Tolerated the procedure well with no immediate complications.  Admitted to the neurosurgery service for routine post-op care.  Morphine used for pain control.  Cardiology following.  Found to have a right soleal DVT on duplex on 3/28/2017.  CTA chest done to rule out PE which was negative.  Had orthostatic hypotension which has now resolved.  Physical therapy evaluated patient adn recommended subacute rehab upon discharge.  Cleared for discharge by hospitalist, cardiology (Dr. Matias), and neurosurgery (Dr. Mullins).  PATIENT MUST HAVE SERIAL LOWER EXTREMITY DUPLEX DONE TO RULE OUT PROPOGATION OF DVT.  NEXT DUPLEX IS DUE ON 4/4/2017.  Patient with low grade fevers while in house.  Urinalysis negative and chest xray negative, patient refused blood cultures. Patient is a 61 year old female who presented with back pain and right lower extremity weakness.  Underwent L4-L5 posterior lumbar interbody fusion on 3/23/2017.  Tolerated the procedure well with no immediate complications.  Admitted to the neurosurgery service for routine post-op care.  Morphine used for pain control.  Cardiology following.  Found to have a right soleal DVT on duplex on 3/28/2017.  CTA chest done to rule out PE which was negative.  Had orthostatic hypotension which has now resolved.  Physical therapy evaluated patient adn recommended subacute rehab upon discharge.  Cleared for discharge by hospitalist, cardiology (Dr. Matias), and neurosurgery (Dr. Mullins).  PATIENT MUST HAVE SERIAL LOWER EXTREMITY DUPLEX DONE TO RULE OUT PROPOGATION OF DVT.  NEXT DUPLEX IS DUE ON 4/4/2017.  Patient with low grade fevers while in house.  Urinalysis negative and chest xray negative, patient refused blood cultures.  Incision staples can be removed on post-op day #14 (4/6/2017).

## 2017-03-31 NOTE — DISCHARGE NOTE ADULT - MEDICATION SUMMARY - MEDICATIONS TO TAKE
I will START or STAY ON the medications listed below when I get home from the hospital:    morphine 15 mg oral tablet  -- 1 tab(s) by mouth every 4 hours, As needed, Severe Pain (7 - 10)  -- Indication: For pain control    morphine  -- 7.5 milligram(s) by mouth every 4 hours prn moderate pain  -- Indication: For pain control    acetaminophen 325 mg oral tablet  -- 2 tab(s) by mouth every 6 hours, As needed, For Temp greater than 38 C (100.4 F)  -- Indication: For fever    acetaminophen 325 mg oral tablet  -- 2 tab(s) by mouth every 6 hours, As needed, Mild Pain (1 - 3)  -- Indication: For pain control    enoxaparin  -- 40 milligram(s) subcutaneous once a day (at bedtime)  -- Indication: For dvt prophylaxis    gabapentin 600 mg oral tablet  -- 1 tab(s) by mouth 3 times a day  -- Indication: For pain control    letrozole 2.5 mg oral tablet  -- 1 tab(s) by mouth once a day  -- Indication: For breast ca    metoprolol succinate 50 mg oral tablet, extended release  -- 1 tab(s) by mouth once a day  -- Indication: For bp/hr control    docusate sodium 100 mg oral capsule  -- 1 cap(s) by mouth 3 times a day  -- Indication: For bowel regimen    senna oral tablet  -- 2 tab(s) by mouth once a day (at bedtime)  -- Indication: For bowel regimen    cyclobenzaprine 5 mg oral tablet  -- 1 tab(s) by mouth every 6 hours, As needed, Muscle Spasm  -- Indication: For muscle spasms    NexIUM 40 mg oral delayed release capsule  -- 1 cap(s) by mouth once a day  -- Indication: For gi prophylaxis    Vitamin D3 2000 intl units oral capsule  -- 1 cap(s) by mouth once a day  -- Indication: For Supplement

## 2017-03-31 NOTE — DISCHARGE NOTE ADULT - ADDITIONAL INSTRUCTIONS
cardiac diet  No strenuous activity.  No lifting.  Do not return to work until cleared to do so by physician.  No driving until cleared to do so by physician.   keep wound clean and dry. do not soak in water. pat dry. do not apply cream/moisture to incision.  items for follow up: wound check  follow up with neurosurgery, Dr. Mullins, within 1 week of discharge from rehab  follow up with cardiology, Dr. Matias, within 1 week of discharge from rehab  follow up with PMD within 1 week of discharge from rehab    Go to the ED if you have any: bleeding, nausea, vomiting, diarrhea, constipation, fevers, chills, chest pain, shortness of breath, dizziness, gait/balance disturbances, wound drainage/erythema, pain not controlled by medication, or any other new symptoms.

## 2017-04-26 ENCOUNTER — OUTPATIENT (OUTPATIENT)
Dept: OUTPATIENT SERVICES | Facility: HOSPITAL | Age: 62
LOS: 1 days | End: 2017-04-26
Payer: MEDICARE

## 2017-04-26 DIAGNOSIS — C50.911 MALIGNANT NEOPLASM OF UNSPECIFIED SITE OF RIGHT FEMALE BREAST: Chronic | ICD-10-CM

## 2017-04-26 DIAGNOSIS — H50.9 UNSPECIFIED STRABISMUS: Chronic | ICD-10-CM

## 2017-04-26 DIAGNOSIS — Z98.89 OTHER SPECIFIED POSTPROCEDURAL STATES: Chronic | ICD-10-CM

## 2017-04-26 DIAGNOSIS — C43.72 MALIGNANT MELANOMA OF LEFT LOWER LIMB, INCLUDING HIP: Chronic | ICD-10-CM

## 2017-04-26 DIAGNOSIS — M43.26 FUSION OF SPINE, LUMBAR REGION: ICD-10-CM

## 2017-04-26 DIAGNOSIS — C07 MALIGNANT NEOPLASM OF PAROTID GLAND: Chronic | ICD-10-CM

## 2017-04-26 DIAGNOSIS — Z98.1 ARTHRODESIS STATUS: ICD-10-CM

## 2017-04-26 PROCEDURE — 72131 CT LUMBAR SPINE W/O DYE: CPT | Mod: 26

## 2017-04-26 PROCEDURE — 72131 CT LUMBAR SPINE W/O DYE: CPT

## 2017-04-27 PROBLEM — H04.201 EYE TEARING, RIGHT: Status: ACTIVE | Noted: 2017-03-06

## 2017-05-08 ENCOUNTER — APPOINTMENT (OUTPATIENT)
Dept: SURGICAL ONCOLOGY | Facility: CLINIC | Age: 62
End: 2017-05-08

## 2017-05-11 ENCOUNTER — APPOINTMENT (OUTPATIENT)
Dept: INTERNAL MEDICINE | Facility: CLINIC | Age: 62
End: 2017-05-11

## 2017-05-11 VITALS — HEART RATE: 100 BPM | SYSTOLIC BLOOD PRESSURE: 120 MMHG | DIASTOLIC BLOOD PRESSURE: 72 MMHG | OXYGEN SATURATION: 96 %

## 2017-05-11 DIAGNOSIS — Z87.898 PERSONAL HISTORY OF OTHER SPECIFIED CONDITIONS: ICD-10-CM

## 2017-05-18 ENCOUNTER — OUTPATIENT (OUTPATIENT)
Dept: OUTPATIENT SERVICES | Facility: HOSPITAL | Age: 62
LOS: 1 days | Discharge: HOME | End: 2017-05-18

## 2017-05-18 DIAGNOSIS — H50.9 UNSPECIFIED STRABISMUS: Chronic | ICD-10-CM

## 2017-05-18 DIAGNOSIS — C07 MALIGNANT NEOPLASM OF PAROTID GLAND: Chronic | ICD-10-CM

## 2017-05-18 DIAGNOSIS — C50.919 MALIGNANT NEOPLASM OF UNSPECIFIED SITE OF UNSPECIFIED FEMALE BREAST: ICD-10-CM

## 2017-05-18 DIAGNOSIS — C50.911 MALIGNANT NEOPLASM OF UNSPECIFIED SITE OF RIGHT FEMALE BREAST: Chronic | ICD-10-CM

## 2017-05-18 DIAGNOSIS — Z98.89 OTHER SPECIFIED POSTPROCEDURAL STATES: Chronic | ICD-10-CM

## 2017-05-18 DIAGNOSIS — C43.72 MALIGNANT MELANOMA OF LEFT LOWER LIMB, INCLUDING HIP: Chronic | ICD-10-CM

## 2017-05-19 ENCOUNTER — RESULT REVIEW (OUTPATIENT)
Age: 62
End: 2017-05-19

## 2017-05-19 ENCOUNTER — APPOINTMENT (OUTPATIENT)
Dept: HEMATOLOGY ONCOLOGY | Facility: CLINIC | Age: 62
End: 2017-05-19

## 2017-05-19 ENCOUNTER — APPOINTMENT (OUTPATIENT)
Dept: INFUSION THERAPY | Facility: HOSPITAL | Age: 62
End: 2017-05-19

## 2017-05-19 ENCOUNTER — LABORATORY RESULT (OUTPATIENT)
Age: 62
End: 2017-05-19

## 2017-05-19 VITALS
OXYGEN SATURATION: 96 % | HEART RATE: 80 BPM | SYSTOLIC BLOOD PRESSURE: 142 MMHG | RESPIRATION RATE: 16 BRPM | DIASTOLIC BLOOD PRESSURE: 86 MMHG | TEMPERATURE: 97.2 F

## 2017-05-19 LAB
HCT VFR BLD CALC: 34.2 % — LOW (ref 34.5–45)
HGB BLD-MCNC: 11.8 G/DL — SIGNIFICANT CHANGE UP (ref 11.5–15.5)
MCHC RBC-ENTMCNC: 32.5 PG — SIGNIFICANT CHANGE UP (ref 27–34)
MCHC RBC-ENTMCNC: 34.4 G/DL — SIGNIFICANT CHANGE UP (ref 32–36)
MCV RBC AUTO: 94.7 FL — SIGNIFICANT CHANGE UP (ref 80–100)
PLATELET # BLD AUTO: 215 K/UL — SIGNIFICANT CHANGE UP (ref 150–400)
RBC # BLD: 3.62 M/UL — LOW (ref 3.8–5.2)
RBC # FLD: 12.1 % — SIGNIFICANT CHANGE UP (ref 10.3–14.5)
WBC # BLD: 7.3 K/UL — SIGNIFICANT CHANGE UP (ref 3.8–10.5)
WBC # FLD AUTO: 7.3 K/UL — SIGNIFICANT CHANGE UP (ref 3.8–10.5)

## 2017-07-05 ENCOUNTER — MEDICATION RENEWAL (OUTPATIENT)
Age: 62
End: 2017-07-05

## 2017-07-19 ENCOUNTER — OUTPATIENT (OUTPATIENT)
Dept: OUTPATIENT SERVICES | Facility: HOSPITAL | Age: 62
LOS: 1 days | Discharge: HOME | End: 2017-07-19

## 2017-07-19 DIAGNOSIS — H50.9 UNSPECIFIED STRABISMUS: Chronic | ICD-10-CM

## 2017-07-19 DIAGNOSIS — C50.919 MALIGNANT NEOPLASM OF UNSPECIFIED SITE OF UNSPECIFIED FEMALE BREAST: ICD-10-CM

## 2017-07-19 DIAGNOSIS — C43.72 MALIGNANT MELANOMA OF LEFT LOWER LIMB, INCLUDING HIP: Chronic | ICD-10-CM

## 2017-07-19 DIAGNOSIS — C07 MALIGNANT NEOPLASM OF PAROTID GLAND: Chronic | ICD-10-CM

## 2017-07-19 DIAGNOSIS — Z98.89 OTHER SPECIFIED POSTPROCEDURAL STATES: Chronic | ICD-10-CM

## 2017-07-19 DIAGNOSIS — C50.911 MALIGNANT NEOPLASM OF UNSPECIFIED SITE OF RIGHT FEMALE BREAST: Chronic | ICD-10-CM

## 2017-07-24 ENCOUNTER — LABORATORY RESULT (OUTPATIENT)
Age: 62
End: 2017-07-24

## 2017-07-24 ENCOUNTER — APPOINTMENT (OUTPATIENT)
Dept: INFUSION THERAPY | Facility: HOSPITAL | Age: 62
End: 2017-07-24

## 2017-07-24 ENCOUNTER — APPOINTMENT (OUTPATIENT)
Dept: HEMATOLOGY ONCOLOGY | Facility: CLINIC | Age: 62
End: 2017-07-24

## 2017-07-24 ENCOUNTER — RESULT REVIEW (OUTPATIENT)
Age: 62
End: 2017-07-24

## 2017-07-24 VITALS
RESPIRATION RATE: 16 BRPM | HEART RATE: 74 BPM | TEMPERATURE: 97.3 F | OXYGEN SATURATION: 98 % | SYSTOLIC BLOOD PRESSURE: 130 MMHG | DIASTOLIC BLOOD PRESSURE: 80 MMHG

## 2017-07-24 LAB
HCT VFR BLD CALC: 36.9 % — SIGNIFICANT CHANGE UP (ref 34.5–45)
HGB BLD-MCNC: 12.4 G/DL — SIGNIFICANT CHANGE UP (ref 11.5–15.5)
MCHC RBC-ENTMCNC: 31.4 PG — SIGNIFICANT CHANGE UP (ref 27–34)
MCHC RBC-ENTMCNC: 33.5 G/DL — SIGNIFICANT CHANGE UP (ref 32–36)
MCV RBC AUTO: 93.7 FL — SIGNIFICANT CHANGE UP (ref 80–100)
PLATELET # BLD AUTO: 226 K/UL — SIGNIFICANT CHANGE UP (ref 150–400)
RBC # BLD: 3.94 M/UL — SIGNIFICANT CHANGE UP (ref 3.8–5.2)
RBC # FLD: 13.3 % — SIGNIFICANT CHANGE UP (ref 10.3–14.5)
WBC # BLD: 5.1 K/UL — SIGNIFICANT CHANGE UP (ref 3.8–10.5)
WBC # FLD AUTO: 5.1 K/UL — SIGNIFICANT CHANGE UP (ref 3.8–10.5)

## 2017-07-24 RX ORDER — SUCRALFATE 1 G/10ML
1 SUSPENSION ORAL
Qty: 420 | Refills: 0 | Status: DISCONTINUED | COMMUNITY
Start: 2016-07-12 | End: 2017-07-24

## 2017-07-24 RX ORDER — CYCLOBENZAPRINE HYDROCHLORIDE 5 MG/1
5 TABLET, FILM COATED ORAL EVERY 6 HOURS
Refills: 0 | Status: DISCONTINUED | COMMUNITY
Start: 2017-05-11 | End: 2017-07-24

## 2017-07-31 ENCOUNTER — APPOINTMENT (OUTPATIENT)
Dept: GASTROENTEROLOGY | Facility: CLINIC | Age: 62
End: 2017-07-31

## 2017-08-31 ENCOUNTER — MESSAGE (OUTPATIENT)
Age: 62
End: 2017-08-31

## 2017-08-31 ENCOUNTER — APPOINTMENT (OUTPATIENT)
Dept: INTERNAL MEDICINE | Facility: CLINIC | Age: 62
End: 2017-08-31

## 2017-09-05 ENCOUNTER — OUTPATIENT (OUTPATIENT)
Dept: OUTPATIENT SERVICES | Facility: HOSPITAL | Age: 62
LOS: 1 days | End: 2017-09-05
Payer: MEDICARE

## 2017-09-05 ENCOUNTER — APPOINTMENT (OUTPATIENT)
Dept: CT IMAGING | Facility: IMAGING CENTER | Age: 62
End: 2017-09-05
Payer: MEDICARE

## 2017-09-05 DIAGNOSIS — Z98.89 OTHER SPECIFIED POSTPROCEDURAL STATES: Chronic | ICD-10-CM

## 2017-09-05 DIAGNOSIS — H50.9 UNSPECIFIED STRABISMUS: Chronic | ICD-10-CM

## 2017-09-05 DIAGNOSIS — C07 MALIGNANT NEOPLASM OF PAROTID GLAND: Chronic | ICD-10-CM

## 2017-09-05 DIAGNOSIS — Z00.8 ENCOUNTER FOR OTHER GENERAL EXAMINATION: ICD-10-CM

## 2017-09-05 DIAGNOSIS — C43.72 MALIGNANT MELANOMA OF LEFT LOWER LIMB, INCLUDING HIP: Chronic | ICD-10-CM

## 2017-09-05 DIAGNOSIS — C50.911 MALIGNANT NEOPLASM OF UNSPECIFIED SITE OF RIGHT FEMALE BREAST: Chronic | ICD-10-CM

## 2017-09-05 PROCEDURE — 72131 CT LUMBAR SPINE W/O DYE: CPT | Mod: 26

## 2017-09-05 PROCEDURE — 72131 CT LUMBAR SPINE W/O DYE: CPT

## 2017-09-08 ENCOUNTER — APPOINTMENT (OUTPATIENT)
Dept: RADIATION ONCOLOGY | Facility: CLINIC | Age: 62
End: 2017-09-08
Payer: MEDICARE

## 2017-09-08 ENCOUNTER — OUTPATIENT (OUTPATIENT)
Dept: OUTPATIENT SERVICES | Facility: HOSPITAL | Age: 62
LOS: 1 days | Discharge: HOME | End: 2017-09-08

## 2017-09-08 VITALS
HEART RATE: 88 BPM | SYSTOLIC BLOOD PRESSURE: 134 MMHG | BODY MASS INDEX: 30.79 KG/M2 | RESPIRATION RATE: 16 BRPM | DIASTOLIC BLOOD PRESSURE: 88 MMHG | WEIGHT: 185 LBS | OXYGEN SATURATION: 98 %

## 2017-09-08 DIAGNOSIS — C50.911 MALIGNANT NEOPLASM OF UNSPECIFIED SITE OF RIGHT FEMALE BREAST: Chronic | ICD-10-CM

## 2017-09-08 DIAGNOSIS — C43.72 MALIGNANT MELANOMA OF LEFT LOWER LIMB, INCLUDING HIP: Chronic | ICD-10-CM

## 2017-09-08 DIAGNOSIS — C50.919 MALIGNANT NEOPLASM OF UNSPECIFIED SITE OF UNSPECIFIED FEMALE BREAST: ICD-10-CM

## 2017-09-08 DIAGNOSIS — H50.9 UNSPECIFIED STRABISMUS: Chronic | ICD-10-CM

## 2017-09-08 DIAGNOSIS — C07 MALIGNANT NEOPLASM OF PAROTID GLAND: Chronic | ICD-10-CM

## 2017-09-08 DIAGNOSIS — Z98.89 OTHER SPECIFIED POSTPROCEDURAL STATES: Chronic | ICD-10-CM

## 2017-09-08 PROCEDURE — 99214 OFFICE O/P EST MOD 30 MIN: CPT

## 2017-09-12 ENCOUNTER — APPOINTMENT (OUTPATIENT)
Dept: INFUSION THERAPY | Facility: HOSPITAL | Age: 62
End: 2017-09-12

## 2017-09-14 ENCOUNTER — APPOINTMENT (OUTPATIENT)
Dept: INTERNAL MEDICINE | Facility: CLINIC | Age: 62
End: 2017-09-14
Payer: MEDICARE

## 2017-09-14 DIAGNOSIS — E55.9 VITAMIN D DEFICIENCY, UNSPECIFIED: ICD-10-CM

## 2017-09-14 LAB
ALBUMIN SERPL ELPH-MCNC: 4.1 G/DL
ALP BLD-CCNC: 100 U/L
ALT SERPL-CCNC: 9 U/L
ANION GAP SERPL CALC-SCNC: 14 MMOL/L
AST SERPL-CCNC: 13 U/L
BASOPHILS # BLD AUTO: 0.02 K/UL
BASOPHILS NFR BLD AUTO: 0.4 %
BILIRUB SERPL-MCNC: 0.2 MG/DL
BUN SERPL-MCNC: 15 MG/DL
CALCIUM SERPL-MCNC: 9.7 MG/DL
CHLORIDE SERPL-SCNC: 105 MMOL/L
CHOLEST SERPL-MCNC: 157 MG/DL
CHOLEST/HDLC SERPL: 2.3 RATIO
CO2 SERPL-SCNC: 27 MMOL/L
CREAT SERPL-MCNC: 0.77 MG/DL
EOSINOPHIL # BLD AUTO: 0.15 K/UL
EOSINOPHIL NFR BLD AUTO: 3 %
GLUCOSE SERPL-MCNC: 83 MG/DL
HBA1C MFR BLD HPLC: 5.6 %
HCT VFR BLD CALC: 36.9 %
HDLC SERPL-MCNC: 69 MG/DL
HGB BLD-MCNC: 11.8 G/DL
IMM GRANULOCYTES NFR BLD AUTO: 0 %
LDLC SERPL CALC-MCNC: 64 MG/DL
LYMPHOCYTES # BLD AUTO: 1.6 K/UL
LYMPHOCYTES NFR BLD AUTO: 31.9 %
MAN DIFF?: NORMAL
MCHC RBC-ENTMCNC: 30.9 PG
MCHC RBC-ENTMCNC: 32 GM/DL
MCV RBC AUTO: 96.6 FL
MONOCYTES # BLD AUTO: 0.28 K/UL
MONOCYTES NFR BLD AUTO: 5.6 %
NEUTROPHILS # BLD AUTO: 2.96 K/UL
NEUTROPHILS NFR BLD AUTO: 59.1 %
PLATELET # BLD AUTO: 219 K/UL
POTASSIUM SERPL-SCNC: 4.9 MMOL/L
PROT SERPL-MCNC: 7.1 G/DL
RBC # BLD: 3.82 M/UL
RBC # FLD: 13.3 %
SODIUM SERPL-SCNC: 146 MMOL/L
TRIGL SERPL-MCNC: 118 MG/DL
WBC # FLD AUTO: 5.01 K/UL

## 2017-09-14 PROCEDURE — 99215 OFFICE O/P EST HI 40 MIN: CPT | Mod: 25

## 2017-09-14 PROCEDURE — G0008: CPT

## 2017-09-14 PROCEDURE — 90688 IIV4 VACCINE SPLT 0.5 ML IM: CPT

## 2017-09-15 ENCOUNTER — APPOINTMENT (OUTPATIENT)
Dept: INFUSION THERAPY | Facility: HOSPITAL | Age: 62
End: 2017-09-15

## 2017-09-15 ENCOUNTER — RESULT REVIEW (OUTPATIENT)
Age: 62
End: 2017-09-15

## 2017-09-15 ENCOUNTER — LABORATORY RESULT (OUTPATIENT)
Age: 62
End: 2017-09-15

## 2017-09-15 LAB
25(OH)D3 SERPL-MCNC: 31.3 NG/ML
HCT VFR BLD CALC: 34.5 % — SIGNIFICANT CHANGE UP (ref 34.5–45)
HGB BLD-MCNC: 11.9 G/DL — SIGNIFICANT CHANGE UP (ref 11.5–15.5)
MCHC RBC-ENTMCNC: 32.4 PG — SIGNIFICANT CHANGE UP (ref 27–34)
MCHC RBC-ENTMCNC: 34.5 G/DL — SIGNIFICANT CHANGE UP (ref 32–36)
MCV RBC AUTO: 93.9 FL — SIGNIFICANT CHANGE UP (ref 80–100)
PLATELET # BLD AUTO: 188 K/UL — SIGNIFICANT CHANGE UP (ref 150–400)
RBC # BLD: 3.67 M/UL — LOW (ref 3.8–5.2)
RBC # FLD: 13.3 % — SIGNIFICANT CHANGE UP (ref 10.3–14.5)
TSH SERPL-ACNC: 1.46 UIU/ML
WBC # BLD: 4.8 K/UL — SIGNIFICANT CHANGE UP (ref 3.8–10.5)
WBC # FLD AUTO: 4.8 K/UL — SIGNIFICANT CHANGE UP (ref 3.8–10.5)

## 2017-09-20 ENCOUNTER — APPOINTMENT (OUTPATIENT)
Dept: SURGICAL ONCOLOGY | Facility: CLINIC | Age: 62
End: 2017-09-20
Payer: MEDICARE

## 2017-10-06 ENCOUNTER — APPOINTMENT (OUTPATIENT)
Dept: PHYSICAL MEDICINE AND REHAB | Facility: CLINIC | Age: 62
End: 2017-10-06
Payer: MEDICARE

## 2017-10-06 VITALS
TEMPERATURE: 97.8 F | OXYGEN SATURATION: 97 % | DIASTOLIC BLOOD PRESSURE: 80 MMHG | HEART RATE: 76 BPM | HEIGHT: 65 IN | SYSTOLIC BLOOD PRESSURE: 121 MMHG

## 2017-10-06 DIAGNOSIS — Z01.419 ENCOUNTER FOR GYNECOLOGICAL EXAMINATION (GENERAL) (ROUTINE) W/OUT ABNORMAL FINDINGS: ICD-10-CM

## 2017-10-06 DIAGNOSIS — R29.898 OTHER SYMPTOMS AND SIGNS INVOLVING THE MUSCULOSKELETAL SYSTEM: ICD-10-CM

## 2017-10-06 PROCEDURE — 99204 OFFICE O/P NEW MOD 45 MIN: CPT

## 2017-11-13 ENCOUNTER — APPOINTMENT (OUTPATIENT)
Dept: SURGICAL ONCOLOGY | Facility: CLINIC | Age: 62
End: 2017-11-13
Payer: MEDICARE

## 2017-11-13 VITALS
BODY MASS INDEX: 29.32 KG/M2 | HEART RATE: 70 BPM | HEIGHT: 65 IN | RESPIRATION RATE: 16 BRPM | SYSTOLIC BLOOD PRESSURE: 160 MMHG | OXYGEN SATURATION: 98 % | WEIGHT: 176 LBS | DIASTOLIC BLOOD PRESSURE: 83 MMHG

## 2017-11-13 PROCEDURE — 99214 OFFICE O/P EST MOD 30 MIN: CPT

## 2017-11-14 ENCOUNTER — OUTPATIENT (OUTPATIENT)
Dept: OUTPATIENT SERVICES | Facility: HOSPITAL | Age: 62
LOS: 1 days | Discharge: HOME | End: 2017-11-14

## 2017-11-14 DIAGNOSIS — C50.919 MALIGNANT NEOPLASM OF UNSPECIFIED SITE OF UNSPECIFIED FEMALE BREAST: ICD-10-CM

## 2017-11-14 DIAGNOSIS — Z98.89 OTHER SPECIFIED POSTPROCEDURAL STATES: Chronic | ICD-10-CM

## 2017-11-14 DIAGNOSIS — C43.72 MALIGNANT MELANOMA OF LEFT LOWER LIMB, INCLUDING HIP: Chronic | ICD-10-CM

## 2017-11-14 DIAGNOSIS — C07 MALIGNANT NEOPLASM OF PAROTID GLAND: Chronic | ICD-10-CM

## 2017-11-14 DIAGNOSIS — H50.9 UNSPECIFIED STRABISMUS: Chronic | ICD-10-CM

## 2017-11-14 DIAGNOSIS — C50.911 MALIGNANT NEOPLASM OF UNSPECIFIED SITE OF RIGHT FEMALE BREAST: Chronic | ICD-10-CM

## 2017-11-17 ENCOUNTER — APPOINTMENT (OUTPATIENT)
Dept: INFUSION THERAPY | Facility: HOSPITAL | Age: 62
End: 2017-11-17

## 2017-11-17 ENCOUNTER — APPOINTMENT (OUTPATIENT)
Dept: HEMATOLOGY ONCOLOGY | Facility: CLINIC | Age: 62
End: 2017-11-17
Payer: MEDICARE

## 2017-11-17 VITALS
SYSTOLIC BLOOD PRESSURE: 130 MMHG | TEMPERATURE: 97.2 F | DIASTOLIC BLOOD PRESSURE: 70 MMHG | RESPIRATION RATE: 16 BRPM | OXYGEN SATURATION: 98 % | WEIGHT: 178.57 LBS | HEART RATE: 73 BPM | BODY MASS INDEX: 29.72 KG/M2

## 2017-11-17 PROCEDURE — 99214 OFFICE O/P EST MOD 30 MIN: CPT

## 2017-11-18 RX ORDER — RIVAROXABAN 20 MG/1
20 TABLET, FILM COATED ORAL
Qty: 30 | Refills: 2 | Status: DISCONTINUED | COMMUNITY
Start: 2017-05-11 | End: 2017-11-18

## 2017-12-05 ENCOUNTER — APPOINTMENT (OUTPATIENT)
Dept: OTOLARYNGOLOGY | Facility: CLINIC | Age: 62
End: 2017-12-05
Payer: MEDICARE

## 2017-12-05 VITALS
BODY MASS INDEX: 29.32 KG/M2 | SYSTOLIC BLOOD PRESSURE: 118 MMHG | HEIGHT: 65 IN | WEIGHT: 176 LBS | HEART RATE: 83 BPM | DIASTOLIC BLOOD PRESSURE: 79 MMHG

## 2017-12-05 DIAGNOSIS — K13.70 UNSPECIFIED LESIONS OF ORAL MUCOSA: ICD-10-CM

## 2017-12-05 PROCEDURE — 92511 NASOPHARYNGOSCOPY: CPT

## 2017-12-05 PROCEDURE — 99214 OFFICE O/P EST MOD 30 MIN: CPT | Mod: 25

## 2017-12-05 RX ORDER — ASPIRIN 81 MG
81 TABLET, DELAYED RELEASE (ENTERIC COATED) ORAL
Refills: 0 | Status: ACTIVE | COMMUNITY

## 2017-12-05 RX ORDER — ASPIRIN ENTERIC COATED TABLETS 81 MG 81 MG/1
81 TABLET, DELAYED RELEASE ORAL DAILY
Qty: 90 | Refills: 2 | Status: DISCONTINUED | COMMUNITY
Start: 2017-11-18 | End: 2017-12-05

## 2017-12-11 ENCOUNTER — LABORATORY RESULT (OUTPATIENT)
Age: 62
End: 2017-12-11

## 2017-12-11 ENCOUNTER — APPOINTMENT (OUTPATIENT)
Dept: OBGYN | Facility: CLINIC | Age: 62
End: 2017-12-11
Payer: MEDICARE

## 2017-12-11 VITALS
HEIGHT: 65 IN | DIASTOLIC BLOOD PRESSURE: 83 MMHG | WEIGHT: 176 LBS | SYSTOLIC BLOOD PRESSURE: 121 MMHG | BODY MASS INDEX: 29.32 KG/M2

## 2017-12-11 PROCEDURE — G0101: CPT

## 2017-12-19 LAB
CYTOLOGY CVX/VAG DOC THIN PREP: NORMAL
HPV HIGH+LOW RISK DNA PNL CVX: DETECTED

## 2018-01-08 ENCOUNTER — APPOINTMENT (OUTPATIENT)
Dept: GASTROENTEROLOGY | Facility: CLINIC | Age: 63
End: 2018-01-08

## 2018-01-10 ENCOUNTER — OUTPATIENT (OUTPATIENT)
Dept: OUTPATIENT SERVICES | Facility: HOSPITAL | Age: 63
LOS: 1 days | Discharge: HOME | End: 2018-01-10

## 2018-01-10 DIAGNOSIS — Z98.89 OTHER SPECIFIED POSTPROCEDURAL STATES: Chronic | ICD-10-CM

## 2018-01-10 DIAGNOSIS — C50.919 MALIGNANT NEOPLASM OF UNSPECIFIED SITE OF UNSPECIFIED FEMALE BREAST: ICD-10-CM

## 2018-01-10 DIAGNOSIS — C07 MALIGNANT NEOPLASM OF PAROTID GLAND: Chronic | ICD-10-CM

## 2018-01-10 DIAGNOSIS — C50.911 MALIGNANT NEOPLASM OF UNSPECIFIED SITE OF RIGHT FEMALE BREAST: Chronic | ICD-10-CM

## 2018-01-10 DIAGNOSIS — C43.72 MALIGNANT MELANOMA OF LEFT LOWER LIMB, INCLUDING HIP: Chronic | ICD-10-CM

## 2018-01-10 DIAGNOSIS — H50.9 UNSPECIFIED STRABISMUS: Chronic | ICD-10-CM

## 2018-01-12 ENCOUNTER — RESULT REVIEW (OUTPATIENT)
Age: 63
End: 2018-01-12

## 2018-01-12 ENCOUNTER — LABORATORY RESULT (OUTPATIENT)
Age: 63
End: 2018-01-12

## 2018-01-12 ENCOUNTER — APPOINTMENT (OUTPATIENT)
Dept: INFUSION THERAPY | Facility: HOSPITAL | Age: 63
End: 2018-01-12

## 2018-01-12 LAB
HCT VFR BLD CALC: 36.1 % — SIGNIFICANT CHANGE UP (ref 34.5–45)
HGB BLD-MCNC: 12.2 G/DL — SIGNIFICANT CHANGE UP (ref 11.5–15.5)
MCHC RBC-ENTMCNC: 32.4 PG — SIGNIFICANT CHANGE UP (ref 27–34)
MCHC RBC-ENTMCNC: 34 G/DL — SIGNIFICANT CHANGE UP (ref 32–36)
MCV RBC AUTO: 95.5 FL — SIGNIFICANT CHANGE UP (ref 80–100)
PLATELET # BLD AUTO: 203 K/UL — SIGNIFICANT CHANGE UP (ref 150–400)
RBC # BLD: 3.78 M/UL — LOW (ref 3.8–5.2)
RBC # FLD: 11.4 % — SIGNIFICANT CHANGE UP (ref 10.3–14.5)
WBC # BLD: 4.9 K/UL — SIGNIFICANT CHANGE UP (ref 3.8–10.5)
WBC # FLD AUTO: 4.9 K/UL — SIGNIFICANT CHANGE UP (ref 3.8–10.5)

## 2018-01-17 DIAGNOSIS — E86.0 DEHYDRATION: ICD-10-CM

## 2018-03-08 ENCOUNTER — APPOINTMENT (OUTPATIENT)
Dept: INTERNAL MEDICINE | Facility: CLINIC | Age: 63
End: 2018-03-08
Payer: MEDICARE

## 2018-03-08 VITALS — DIASTOLIC BLOOD PRESSURE: 79 MMHG | SYSTOLIC BLOOD PRESSURE: 130 MMHG

## 2018-03-08 VITALS — DIASTOLIC BLOOD PRESSURE: 80 MMHG | SYSTOLIC BLOOD PRESSURE: 110 MMHG

## 2018-03-08 PROCEDURE — G0009: CPT

## 2018-03-08 PROCEDURE — 99214 OFFICE O/P EST MOD 30 MIN: CPT | Mod: 25

## 2018-03-08 PROCEDURE — 90670 PCV13 VACCINE IM: CPT

## 2018-03-08 RX ORDER — FERROUS SULFATE 325(65) MG
325 (65 FE) TABLET ORAL
Qty: 180 | Refills: 1 | Status: DISCONTINUED | COMMUNITY
Start: 2017-05-11 | End: 2018-03-08

## 2018-03-13 ENCOUNTER — OUTPATIENT (OUTPATIENT)
Dept: OUTPATIENT SERVICES | Facility: HOSPITAL | Age: 63
LOS: 1 days | Discharge: HOME | End: 2018-03-13

## 2018-03-13 DIAGNOSIS — C43.72 MALIGNANT MELANOMA OF LEFT LOWER LIMB, INCLUDING HIP: Chronic | ICD-10-CM

## 2018-03-13 DIAGNOSIS — Z98.89 OTHER SPECIFIED POSTPROCEDURAL STATES: Chronic | ICD-10-CM

## 2018-03-13 DIAGNOSIS — C07 MALIGNANT NEOPLASM OF PAROTID GLAND: Chronic | ICD-10-CM

## 2018-03-13 DIAGNOSIS — H50.9 UNSPECIFIED STRABISMUS: Chronic | ICD-10-CM

## 2018-03-13 DIAGNOSIS — C50.911 MALIGNANT NEOPLASM OF UNSPECIFIED SITE OF RIGHT FEMALE BREAST: Chronic | ICD-10-CM

## 2018-03-13 DIAGNOSIS — C50.919 MALIGNANT NEOPLASM OF UNSPECIFIED SITE OF UNSPECIFIED FEMALE BREAST: ICD-10-CM

## 2018-03-15 ENCOUNTER — APPOINTMENT (OUTPATIENT)
Dept: RADIATION ONCOLOGY | Facility: CLINIC | Age: 63
End: 2018-03-15
Payer: MEDICARE

## 2018-03-15 VITALS
BODY MASS INDEX: 28.98 KG/M2 | TEMPERATURE: 97.34 F | HEART RATE: 79 BPM | SYSTOLIC BLOOD PRESSURE: 130 MMHG | DIASTOLIC BLOOD PRESSURE: 83 MMHG | WEIGHT: 174.16 LBS | OXYGEN SATURATION: 96 % | RESPIRATION RATE: 15 BRPM

## 2018-03-15 PROCEDURE — 99213 OFFICE O/P EST LOW 20 MIN: CPT

## 2018-03-16 ENCOUNTER — RESULT REVIEW (OUTPATIENT)
Age: 63
End: 2018-03-16

## 2018-03-16 ENCOUNTER — APPOINTMENT (OUTPATIENT)
Dept: INFUSION THERAPY | Facility: HOSPITAL | Age: 63
End: 2018-03-16

## 2018-03-16 ENCOUNTER — APPOINTMENT (OUTPATIENT)
Dept: HEMATOLOGY ONCOLOGY | Facility: CLINIC | Age: 63
End: 2018-03-16
Payer: MEDICARE

## 2018-03-16 ENCOUNTER — LABORATORY RESULT (OUTPATIENT)
Age: 63
End: 2018-03-16

## 2018-03-16 VITALS
WEIGHT: 178.55 LBS | TEMPERATURE: 208.04 F | OXYGEN SATURATION: 96 % | BODY MASS INDEX: 29.72 KG/M2 | SYSTOLIC BLOOD PRESSURE: 149 MMHG | DIASTOLIC BLOOD PRESSURE: 83 MMHG | RESPIRATION RATE: 15 BRPM | HEART RATE: 76 BPM

## 2018-03-16 LAB
HCT VFR BLD CALC: 36.5 % — SIGNIFICANT CHANGE UP (ref 34.5–45)
HGB BLD-MCNC: 12.6 G/DL — SIGNIFICANT CHANGE UP (ref 11.5–15.5)
MCHC RBC-ENTMCNC: 32.6 PG — SIGNIFICANT CHANGE UP (ref 27–34)
MCHC RBC-ENTMCNC: 34.4 G/DL — SIGNIFICANT CHANGE UP (ref 32–36)
MCV RBC AUTO: 94.8 FL — SIGNIFICANT CHANGE UP (ref 80–100)
PLATELET # BLD AUTO: 209 K/UL — SIGNIFICANT CHANGE UP (ref 150–400)
RBC # BLD: 3.85 M/UL — SIGNIFICANT CHANGE UP (ref 3.8–5.2)
RBC # FLD: 11.5 % — SIGNIFICANT CHANGE UP (ref 10.3–14.5)
WBC # BLD: 5.9 K/UL — SIGNIFICANT CHANGE UP (ref 3.8–10.5)
WBC # FLD AUTO: 5.9 K/UL — SIGNIFICANT CHANGE UP (ref 3.8–10.5)

## 2018-03-16 PROCEDURE — 99214 OFFICE O/P EST MOD 30 MIN: CPT

## 2018-03-19 ENCOUNTER — APPOINTMENT (OUTPATIENT)
Dept: OPHTHALMOLOGY | Facility: CLINIC | Age: 63
End: 2018-03-19
Payer: MEDICARE

## 2018-03-19 PROCEDURE — 92012 INTRM OPH EXAM EST PATIENT: CPT

## 2018-05-14 ENCOUNTER — OUTPATIENT (OUTPATIENT)
Dept: OUTPATIENT SERVICES | Facility: HOSPITAL | Age: 63
LOS: 1 days | Discharge: HOME | End: 2018-05-14

## 2018-05-14 DIAGNOSIS — Z98.89 OTHER SPECIFIED POSTPROCEDURAL STATES: Chronic | ICD-10-CM

## 2018-05-14 DIAGNOSIS — H50.9 UNSPECIFIED STRABISMUS: Chronic | ICD-10-CM

## 2018-05-14 DIAGNOSIS — C50.919 MALIGNANT NEOPLASM OF UNSPECIFIED SITE OF UNSPECIFIED FEMALE BREAST: ICD-10-CM

## 2018-05-14 DIAGNOSIS — C43.72 MALIGNANT MELANOMA OF LEFT LOWER LIMB, INCLUDING HIP: Chronic | ICD-10-CM

## 2018-05-14 DIAGNOSIS — C50.911 MALIGNANT NEOPLASM OF UNSPECIFIED SITE OF RIGHT FEMALE BREAST: Chronic | ICD-10-CM

## 2018-05-14 DIAGNOSIS — C07 MALIGNANT NEOPLASM OF PAROTID GLAND: Chronic | ICD-10-CM

## 2018-05-17 ENCOUNTER — APPOINTMENT (OUTPATIENT)
Dept: INFUSION THERAPY | Facility: HOSPITAL | Age: 63
End: 2018-05-17

## 2018-06-04 ENCOUNTER — APPOINTMENT (OUTPATIENT)
Dept: GASTROENTEROLOGY | Facility: CLINIC | Age: 63
End: 2018-06-04
Payer: MEDICARE

## 2018-06-04 VITALS
HEIGHT: 65 IN | RESPIRATION RATE: 14 BRPM | BODY MASS INDEX: 28.66 KG/M2 | DIASTOLIC BLOOD PRESSURE: 84 MMHG | TEMPERATURE: 207.5 F | SYSTOLIC BLOOD PRESSURE: 132 MMHG | HEART RATE: 79 BPM | WEIGHT: 172 LBS | OXYGEN SATURATION: 98 %

## 2018-06-04 PROCEDURE — 99203 OFFICE O/P NEW LOW 30 MIN: CPT

## 2018-06-20 ENCOUNTER — APPOINTMENT (OUTPATIENT)
Dept: VASCULAR SURGERY | Facility: CLINIC | Age: 63
End: 2018-06-20
Payer: MEDICARE

## 2018-06-20 VITALS
HEART RATE: 67 BPM | DIASTOLIC BLOOD PRESSURE: 94 MMHG | HEIGHT: 65 IN | BODY MASS INDEX: 28.66 KG/M2 | SYSTOLIC BLOOD PRESSURE: 162 MMHG | WEIGHT: 172 LBS | TEMPERATURE: 207.32 F

## 2018-06-20 PROCEDURE — 93923 UPR/LXTR ART STDY 3+ LVLS: CPT

## 2018-06-20 PROCEDURE — 99204 OFFICE O/P NEW MOD 45 MIN: CPT

## 2018-06-20 PROCEDURE — XXXXX: CPT

## 2018-06-20 PROCEDURE — 93978 VASCULAR STUDY: CPT

## 2018-06-21 ENCOUNTER — APPOINTMENT (OUTPATIENT)
Dept: VASCULAR SURGERY | Facility: CLINIC | Age: 63
End: 2018-06-21
Payer: MEDICARE

## 2018-07-13 ENCOUNTER — OUTPATIENT (OUTPATIENT)
Dept: OUTPATIENT SERVICES | Facility: HOSPITAL | Age: 63
LOS: 1 days | Discharge: HOME | End: 2018-07-13

## 2018-07-13 DIAGNOSIS — C43.72 MALIGNANT MELANOMA OF LEFT LOWER LIMB, INCLUDING HIP: Chronic | ICD-10-CM

## 2018-07-13 DIAGNOSIS — H50.9 UNSPECIFIED STRABISMUS: Chronic | ICD-10-CM

## 2018-07-13 DIAGNOSIS — C07 MALIGNANT NEOPLASM OF PAROTID GLAND: Chronic | ICD-10-CM

## 2018-07-13 DIAGNOSIS — Z98.89 OTHER SPECIFIED POSTPROCEDURAL STATES: Chronic | ICD-10-CM

## 2018-07-13 DIAGNOSIS — C50.911 MALIGNANT NEOPLASM OF UNSPECIFIED SITE OF RIGHT FEMALE BREAST: Chronic | ICD-10-CM

## 2018-07-13 DIAGNOSIS — C50.919 MALIGNANT NEOPLASM OF UNSPECIFIED SITE OF UNSPECIFIED FEMALE BREAST: ICD-10-CM

## 2018-07-19 ENCOUNTER — APPOINTMENT (OUTPATIENT)
Dept: INFUSION THERAPY | Facility: HOSPITAL | Age: 63
End: 2018-07-19

## 2018-07-19 PROBLEM — M54.16 RADICULOPATHY, LUMBAR REGION: Chronic | Status: ACTIVE | Noted: 2017-03-16

## 2018-08-07 ENCOUNTER — APPOINTMENT (OUTPATIENT)
Dept: VASCULAR SURGERY | Facility: CLINIC | Age: 63
End: 2018-08-07
Payer: MEDICARE

## 2018-08-07 VITALS
BODY MASS INDEX: 29.99 KG/M2 | WEIGHT: 180 LBS | DIASTOLIC BLOOD PRESSURE: 90 MMHG | TEMPERATURE: 208.4 F | HEART RATE: 82 BPM | HEIGHT: 65 IN | SYSTOLIC BLOOD PRESSURE: 147 MMHG

## 2018-08-07 PROCEDURE — 99214 OFFICE O/P EST MOD 30 MIN: CPT

## 2018-09-07 ENCOUNTER — OUTPATIENT (OUTPATIENT)
Dept: OUTPATIENT SERVICES | Facility: HOSPITAL | Age: 63
LOS: 1 days | Discharge: HOME | End: 2018-09-07

## 2018-09-07 DIAGNOSIS — Z98.89 OTHER SPECIFIED POSTPROCEDURAL STATES: Chronic | ICD-10-CM

## 2018-09-07 DIAGNOSIS — C50.911 MALIGNANT NEOPLASM OF UNSPECIFIED SITE OF RIGHT FEMALE BREAST: Chronic | ICD-10-CM

## 2018-09-07 DIAGNOSIS — H50.9 UNSPECIFIED STRABISMUS: Chronic | ICD-10-CM

## 2018-09-07 DIAGNOSIS — C50.919 MALIGNANT NEOPLASM OF UNSPECIFIED SITE OF UNSPECIFIED FEMALE BREAST: ICD-10-CM

## 2018-09-07 DIAGNOSIS — C07 MALIGNANT NEOPLASM OF PAROTID GLAND: Chronic | ICD-10-CM

## 2018-09-07 DIAGNOSIS — C43.72 MALIGNANT MELANOMA OF LEFT LOWER LIMB, INCLUDING HIP: Chronic | ICD-10-CM

## 2018-09-14 ENCOUNTER — APPOINTMENT (OUTPATIENT)
Dept: INFUSION THERAPY | Facility: HOSPITAL | Age: 63
End: 2018-09-14

## 2018-09-14 ENCOUNTER — RESULT REVIEW (OUTPATIENT)
Age: 63
End: 2018-09-14

## 2018-09-14 ENCOUNTER — APPOINTMENT (OUTPATIENT)
Dept: HEMATOLOGY ONCOLOGY | Facility: CLINIC | Age: 63
End: 2018-09-14
Payer: MEDICARE

## 2018-09-14 ENCOUNTER — LABORATORY RESULT (OUTPATIENT)
Age: 63
End: 2018-09-14

## 2018-09-14 VITALS
WEIGHT: 181.22 LBS | OXYGEN SATURATION: 98 % | DIASTOLIC BLOOD PRESSURE: 91 MMHG | HEART RATE: 93 BPM | BODY MASS INDEX: 30.16 KG/M2 | RESPIRATION RATE: 16 BRPM | TEMPERATURE: 208.58 F | SYSTOLIC BLOOD PRESSURE: 157 MMHG

## 2018-09-14 LAB
HCT VFR BLD CALC: 36.1 % — SIGNIFICANT CHANGE UP (ref 34.5–45)
HGB BLD-MCNC: 12.4 G/DL — SIGNIFICANT CHANGE UP (ref 11.5–15.5)
MCHC RBC-ENTMCNC: 32.6 PG — SIGNIFICANT CHANGE UP (ref 27–34)
MCHC RBC-ENTMCNC: 34.5 G/DL — SIGNIFICANT CHANGE UP (ref 32–36)
MCV RBC AUTO: 94.6 FL — SIGNIFICANT CHANGE UP (ref 80–100)
PLATELET # BLD AUTO: 196 K/UL — SIGNIFICANT CHANGE UP (ref 150–400)
RBC # BLD: 3.82 M/UL — SIGNIFICANT CHANGE UP (ref 3.8–5.2)
RBC # FLD: 11.6 % — SIGNIFICANT CHANGE UP (ref 10.3–14.5)
WBC # BLD: 4.9 K/UL — SIGNIFICANT CHANGE UP (ref 3.8–10.5)
WBC # FLD AUTO: 4.9 K/UL — SIGNIFICANT CHANGE UP (ref 3.8–10.5)

## 2018-09-14 PROCEDURE — 99214 OFFICE O/P EST MOD 30 MIN: CPT

## 2018-09-20 ENCOUNTER — APPOINTMENT (OUTPATIENT)
Dept: INTERNAL MEDICINE | Facility: CLINIC | Age: 63
End: 2018-09-20
Payer: MEDICARE

## 2018-09-20 VITALS
HEIGHT: 65 IN | DIASTOLIC BLOOD PRESSURE: 76 MMHG | HEART RATE: 86 BPM | WEIGHT: 174 LBS | SYSTOLIC BLOOD PRESSURE: 120 MMHG | OXYGEN SATURATION: 98 % | BODY MASS INDEX: 28.99 KG/M2

## 2018-09-20 VITALS — SYSTOLIC BLOOD PRESSURE: 126 MMHG | DIASTOLIC BLOOD PRESSURE: 80 MMHG

## 2018-09-20 PROCEDURE — G0439: CPT

## 2018-09-20 RX ORDER — MAGNESIUM 200 MG
200 TABLET ORAL
Refills: 0 | Status: DISCONTINUED | COMMUNITY
Start: 2018-03-08 | End: 2018-09-20

## 2018-09-20 NOTE — PHYSICAL EXAM
[No Acute Distress] : no acute distress [Well Nourished] : well nourished [Well Developed] : well developed [Well-Appearing] : well-appearing [Normal Sclera/Conjunctiva] : normal sclera/conjunctiva [PERRL] : pupils equal round and reactive to light [Normal Outer Ear/Nose] : the outer ears and nose were normal in appearance [Normal Oropharynx] : the oropharynx was normal [No JVD] : no jugular venous distention [Supple] : supple [No Lymphadenopathy] : no lymphadenopathy [Thyroid Normal, No Nodules] : the thyroid was normal and there were no nodules present [No Respiratory Distress] : no respiratory distress  [Clear to Auscultation] : lungs were clear to auscultation bilaterally [No Accessory Muscle Use] : no accessory muscle use [Normal Rate] : normal rate  [Regular Rhythm] : with a regular rhythm [Normal S1, S2] : normal S1 and S2 [No Murmur] : no murmur heard [No Carotid Bruits] : no carotid bruits [No Abdominal Bruit] : a ~M bruit was not heard ~T in the abdomen [No Varicosities] : no varicosities [Pedal Pulses Present] : the pedal pulses are present [No Edema] : there was no peripheral edema [No Extremity Clubbing/Cyanosis] : no extremity clubbing/cyanosis [No Palpable Aorta] : no palpable aorta [Soft] : abdomen soft [Non Tender] : non-tender [Non-distended] : non-distended [No Masses] : no abdominal mass palpated [No HSM] : no HSM [Normal Posterior Cervical Nodes] : no posterior cervical lymphadenopathy [Normal Anterior Cervical Nodes] : no anterior cervical lymphadenopathy [No CVA Tenderness] : no CVA  tenderness [No Spinal Tenderness] : no spinal tenderness [No Joint Swelling] : no joint swelling [Grossly Normal Strength/Tone] : grossly normal strength/tone [No Rash] : no rash [Normal Affect] : the affect was normal [Normal Insight/Judgement] : insight and judgment were intact

## 2018-09-25 LAB
CHOLEST SERPL-MCNC: 193 MG/DL
CHOLEST/HDLC SERPL: 2.2 RATIO
HBA1C MFR BLD HPLC: 5.6 %
HDLC SERPL-MCNC: 87 MG/DL
LDLC SERPL CALC-MCNC: 89 MG/DL
TRIGL SERPL-MCNC: 83 MG/DL
TSH SERPL-ACNC: 1.44 UIU/ML

## 2018-09-25 NOTE — HISTORY OF PRESENT ILLNESS
[de-identified] : 62 y/o F here for CPE.\par Doing well. Managing with crutches and wheelchair when necessary.\par Saw vascular for leg cramps- had dopplers done (no sig disease). Started on Pletal. \par She continues to take her magnesium and tonic water.\par Saw oncology, c/w regimen.\par Due to see gyn later this year.

## 2018-09-25 NOTE — ASSESSMENT
[FreeTextEntry1] : 64 y/o F here for CPE\par H/o breast CA: f/u oncology, on \par HTN: Controlled with diet and exercise\par Elevated alc: check alc today\par h/o melanoma: f/u Dr. Esqueda\par HCM: Flu shot today. Colon utd, Pap utd; Pneumvax next year\par rto 6 mos

## 2018-09-25 NOTE — HEALTH RISK ASSESSMENT
[Good] : ~his/her~  mood as  good [No falls in past year] : Patient reported no falls in the past year [Discussed at today's visit] : Advance Directives Discussed at today's visit [] : No [MammogramComments] : s/p bilateral mastectomy [PapSmearDate] : 12/17 [ColonoscopyDate] : 10/11 [FreeTextEntry4] : Mother- Kim Manriquez primary.\par

## 2018-09-25 NOTE — REVIEW OF SYSTEMS
[Vision Problems] : vision problems [Heartburn] : heartburn [Negative] : Heme/Lymph [FreeTextEntry7] : occasional reflux [FreeTextEntry9] : LE weakness

## 2018-09-30 ENCOUNTER — FORM ENCOUNTER (OUTPATIENT)
Age: 63
End: 2018-09-30

## 2018-10-01 ENCOUNTER — OUTPATIENT (OUTPATIENT)
Dept: OUTPATIENT SERVICES | Facility: HOSPITAL | Age: 63
LOS: 1 days | End: 2018-10-01
Payer: MEDICARE

## 2018-10-01 ENCOUNTER — APPOINTMENT (OUTPATIENT)
Dept: ULTRASOUND IMAGING | Facility: IMAGING CENTER | Age: 63
End: 2018-10-01
Payer: MEDICARE

## 2018-10-01 DIAGNOSIS — C50.911 MALIGNANT NEOPLASM OF UNSPECIFIED SITE OF RIGHT FEMALE BREAST: Chronic | ICD-10-CM

## 2018-10-01 DIAGNOSIS — Z98.89 OTHER SPECIFIED POSTPROCEDURAL STATES: Chronic | ICD-10-CM

## 2018-10-01 DIAGNOSIS — H50.9 UNSPECIFIED STRABISMUS: Chronic | ICD-10-CM

## 2018-10-01 DIAGNOSIS — C43.72 MALIGNANT MELANOMA OF LEFT LOWER LIMB, INCLUDING HIP: Chronic | ICD-10-CM

## 2018-10-01 DIAGNOSIS — C50.919 MALIGNANT NEOPLASM OF UNSPECIFIED SITE OF UNSPECIFIED FEMALE BREAST: ICD-10-CM

## 2018-10-01 DIAGNOSIS — C07 MALIGNANT NEOPLASM OF PAROTID GLAND: Chronic | ICD-10-CM

## 2018-10-01 PROCEDURE — 76642 ULTRASOUND BREAST LIMITED: CPT

## 2018-10-01 PROCEDURE — 76642 ULTRASOUND BREAST LIMITED: CPT | Mod: 26,LT

## 2018-10-31 ENCOUNTER — OUTPATIENT (OUTPATIENT)
Dept: OUTPATIENT SERVICES | Facility: HOSPITAL | Age: 63
LOS: 1 days | Discharge: HOME | End: 2018-10-31

## 2018-10-31 DIAGNOSIS — H50.9 UNSPECIFIED STRABISMUS: Chronic | ICD-10-CM

## 2018-10-31 DIAGNOSIS — C50.911 MALIGNANT NEOPLASM OF UNSPECIFIED SITE OF RIGHT FEMALE BREAST: Chronic | ICD-10-CM

## 2018-10-31 DIAGNOSIS — C43.72 MALIGNANT MELANOMA OF LEFT LOWER LIMB, INCLUDING HIP: Chronic | ICD-10-CM

## 2018-10-31 DIAGNOSIS — C07 MALIGNANT NEOPLASM OF PAROTID GLAND: Chronic | ICD-10-CM

## 2018-10-31 DIAGNOSIS — Z98.89 OTHER SPECIFIED POSTPROCEDURAL STATES: Chronic | ICD-10-CM

## 2018-10-31 DIAGNOSIS — C50.919 MALIGNANT NEOPLASM OF UNSPECIFIED SITE OF UNSPECIFIED FEMALE BREAST: ICD-10-CM

## 2018-11-08 ENCOUNTER — APPOINTMENT (OUTPATIENT)
Dept: INFUSION THERAPY | Facility: HOSPITAL | Age: 63
End: 2018-11-08

## 2018-11-19 ENCOUNTER — APPOINTMENT (OUTPATIENT)
Dept: SURGICAL ONCOLOGY | Facility: CLINIC | Age: 63
End: 2018-11-19
Payer: MEDICARE

## 2018-11-19 VITALS
RESPIRATION RATE: 16 BRPM | BODY MASS INDEX: 28.66 KG/M2 | DIASTOLIC BLOOD PRESSURE: 79 MMHG | HEIGHT: 65 IN | WEIGHT: 172 LBS | SYSTOLIC BLOOD PRESSURE: 130 MMHG | HEART RATE: 74 BPM | OXYGEN SATURATION: 97 %

## 2018-11-19 PROCEDURE — 99213 OFFICE O/P EST LOW 20 MIN: CPT

## 2018-12-13 ENCOUNTER — APPOINTMENT (OUTPATIENT)
Dept: OBGYN | Facility: CLINIC | Age: 63
End: 2018-12-13
Payer: MEDICARE

## 2018-12-13 VITALS
SYSTOLIC BLOOD PRESSURE: 156 MMHG | BODY MASS INDEX: 30.53 KG/M2 | HEIGHT: 65 IN | HEART RATE: 102 BPM | WEIGHT: 183.25 LBS | DIASTOLIC BLOOD PRESSURE: 102 MMHG

## 2018-12-13 DIAGNOSIS — Z01.419 ENCOUNTER FOR GYNECOLOGICAL EXAMINATION (GENERAL) (ROUTINE) W/OUT ABNORMAL FINDINGS: ICD-10-CM

## 2018-12-13 PROCEDURE — G0101: CPT

## 2018-12-14 ENCOUNTER — APPOINTMENT (OUTPATIENT)
Dept: VASCULAR SURGERY | Facility: CLINIC | Age: 63
End: 2018-12-14
Payer: MEDICARE

## 2018-12-14 VITALS
HEIGHT: 65 IN | BODY MASS INDEX: 29.99 KG/M2 | TEMPERATURE: 208.04 F | SYSTOLIC BLOOD PRESSURE: 126 MMHG | WEIGHT: 180 LBS | DIASTOLIC BLOOD PRESSURE: 79 MMHG | HEART RATE: 77 BPM

## 2018-12-14 PROCEDURE — 99214 OFFICE O/P EST MOD 30 MIN: CPT

## 2018-12-20 NOTE — PATIENT PROFILE ADULT. - NSALCOHOLFREQ_GEN_A_CORE_SD
"Date of Service: 12/19/2018    Jovanna Leavitt was seen in a psychiatric followup visit. Interval history was reviewed. Case was discussed with treatment staff. Titos mood and affect today are more irritable. She reports some intensive suicidal as well as homicidal ideation, but states that this is just her being ""reactive\"" and that she would never act on this. She is displaying borderline tendencies with staff and with peers. Nonetheless, continues to derive psychoeducational benefit from group and milieu activities. We did discuss transitional plans, as she knows that she cannot stay in residential treatment indefinitely, and will need to look into sober housing or alternative living. She states that she has a clean and sober friend whom, as she develops more credibility with her recovery, may be able to live with. On mental status, she is as noted above, able to contract for safety, and agrees to let staff know if or feelings become out of her ability to self-restrain. DIAGNOSIS:  Polysubstance dependence. Bipolar disorder. Seizure disorder. Borderline personality disorder. PLAN:  As noted above. Continue present residential level of care and medication regimen. Case to be discussed with multidisciplinary treatment staff on Thursday, 12/20/2018.       Dictated By: Oswald Rivas MD  Signing Provider: MD CHAD Metcalf/reena ([id number])  DD: 12/19/2018 18:34:48 TD: 12/19/2018 18:51:39    Copy Sent To:   " monthly or less

## 2018-12-27 LAB — CYTOLOGY CVX/VAG DOC THIN PREP: NORMAL

## 2019-01-04 NOTE — H&P PST ADULT - PAIN LOCATION, PROFILE
Chief Complaint:    Chief Complaint   Patient presents with   • Shoulder     post op hip and shoulder       History of Present Illness:  Patient is here for post-hospital visit for her left proximal humerus fracture and right-sided pubic rami fractures. She does have a history of bilateral hip fractures, treated surgically in the past as well and she is staying at [facility] for continued rehabilitation, but is progressing nicely and basically now independent with her cares. She is now interested in going home however we would like to coordinate this with her daughter who will likely visit from out of town and help with the transition    Physical Exam:  Vitals:    01/04/19 0905   BP: 131/68   Pulse: 101   Resp: 16     The left shoulder demonstrates forward flexion to about 95°. She has minimal tenderness with internal and external rotation. The bilateral lower extremities demonstrate nontender range of motion of the hips    Radiographs:  X-rays of been reviewed demonstrating interval healing and aggressive callus formation over the proximal humerus fracture. Alignment looks satisfactory.    Impression:    1. Closed 2-part displaced fracture of surgical neck of left humerus with routine healing, subsequent encounter    2. Closed bilateral fracture of pubic rami, initial encounter (CMS/[facility])        Plan:  This patient is certainly ready to transition home. We talked about going home sometime next week and more importantly, coordinating this with the visit from her daughter who is from out of state. I think her daughter should be present to help with the transition home in this elderly female. Her fracture of the left shoulder is healing fine. She may always have some limited range of motion however she has very little pain. The hip fracture and the pelvic ring fracture looks fine as well.     My recommendation is that this patient transition home however this needs to be coordinated with her visiting daughter.     back

## 2019-01-08 ENCOUNTER — OUTPATIENT (OUTPATIENT)
Dept: OUTPATIENT SERVICES | Facility: HOSPITAL | Age: 64
LOS: 1 days | Discharge: HOME | End: 2019-01-08

## 2019-01-08 DIAGNOSIS — C50.919 MALIGNANT NEOPLASM OF UNSPECIFIED SITE OF UNSPECIFIED FEMALE BREAST: ICD-10-CM

## 2019-01-08 DIAGNOSIS — C50.911 MALIGNANT NEOPLASM OF UNSPECIFIED SITE OF RIGHT FEMALE BREAST: Chronic | ICD-10-CM

## 2019-01-08 DIAGNOSIS — C43.72 MALIGNANT MELANOMA OF LEFT LOWER LIMB, INCLUDING HIP: Chronic | ICD-10-CM

## 2019-01-08 DIAGNOSIS — C07 MALIGNANT NEOPLASM OF PAROTID GLAND: Chronic | ICD-10-CM

## 2019-01-08 DIAGNOSIS — H50.9 UNSPECIFIED STRABISMUS: Chronic | ICD-10-CM

## 2019-01-08 DIAGNOSIS — Z98.89 OTHER SPECIFIED POSTPROCEDURAL STATES: Chronic | ICD-10-CM

## 2019-01-11 ENCOUNTER — RESULT REVIEW (OUTPATIENT)
Age: 64
End: 2019-01-11

## 2019-01-11 ENCOUNTER — APPOINTMENT (OUTPATIENT)
Dept: INFUSION THERAPY | Facility: HOSPITAL | Age: 64
End: 2019-01-11

## 2019-01-11 ENCOUNTER — LABORATORY RESULT (OUTPATIENT)
Age: 64
End: 2019-01-11

## 2019-01-11 LAB
HCT VFR BLD CALC: 37.1 % — SIGNIFICANT CHANGE UP (ref 34.5–45)
HGB BLD-MCNC: 12.8 G/DL — SIGNIFICANT CHANGE UP (ref 11.5–15.5)
MCHC RBC-ENTMCNC: 32.5 PG — SIGNIFICANT CHANGE UP (ref 27–34)
MCHC RBC-ENTMCNC: 34.6 G/DL — SIGNIFICANT CHANGE UP (ref 32–36)
MCV RBC AUTO: 94 FL — SIGNIFICANT CHANGE UP (ref 80–100)
PLATELET # BLD AUTO: 226 K/UL — SIGNIFICANT CHANGE UP (ref 150–400)
RBC # BLD: 3.94 M/UL — SIGNIFICANT CHANGE UP (ref 3.8–5.2)
RBC # FLD: 11.4 % — SIGNIFICANT CHANGE UP (ref 10.3–14.5)
WBC # BLD: 4.8 K/UL — SIGNIFICANT CHANGE UP (ref 3.8–10.5)
WBC # FLD AUTO: 4.8 K/UL — SIGNIFICANT CHANGE UP (ref 3.8–10.5)

## 2019-01-16 ENCOUNTER — FORM ENCOUNTER (OUTPATIENT)
Age: 64
End: 2019-01-16

## 2019-01-17 ENCOUNTER — APPOINTMENT (OUTPATIENT)
Dept: ULTRASOUND IMAGING | Facility: IMAGING CENTER | Age: 64
End: 2019-01-17
Payer: MEDICARE

## 2019-01-17 ENCOUNTER — OUTPATIENT (OUTPATIENT)
Dept: OUTPATIENT SERVICES | Facility: HOSPITAL | Age: 64
LOS: 1 days | End: 2019-01-17
Payer: MEDICARE

## 2019-01-17 DIAGNOSIS — C43.72 MALIGNANT MELANOMA OF LEFT LOWER LIMB, INCLUDING HIP: Chronic | ICD-10-CM

## 2019-01-17 DIAGNOSIS — C07 MALIGNANT NEOPLASM OF PAROTID GLAND: Chronic | ICD-10-CM

## 2019-01-17 DIAGNOSIS — H50.9 UNSPECIFIED STRABISMUS: Chronic | ICD-10-CM

## 2019-01-17 DIAGNOSIS — Z98.89 OTHER SPECIFIED POSTPROCEDURAL STATES: Chronic | ICD-10-CM

## 2019-01-17 DIAGNOSIS — Z01.419 ENCOUNTER FOR GYNECOLOGICAL EXAMINATION (GENERAL) (ROUTINE) WITHOUT ABNORMAL FINDINGS: ICD-10-CM

## 2019-01-17 DIAGNOSIS — N95.0 POSTMENOPAUSAL BLEEDING: ICD-10-CM

## 2019-01-17 DIAGNOSIS — C50.911 MALIGNANT NEOPLASM OF UNSPECIFIED SITE OF RIGHT FEMALE BREAST: Chronic | ICD-10-CM

## 2019-01-17 PROCEDURE — 76856 US EXAM PELVIC COMPLETE: CPT

## 2019-01-17 PROCEDURE — 76830 TRANSVAGINAL US NON-OB: CPT | Mod: 26

## 2019-01-17 PROCEDURE — 76856 US EXAM PELVIC COMPLETE: CPT | Mod: 26

## 2019-01-17 PROCEDURE — 76830 TRANSVAGINAL US NON-OB: CPT

## 2019-01-29 ENCOUNTER — APPOINTMENT (OUTPATIENT)
Dept: OTOLARYNGOLOGY | Facility: CLINIC | Age: 64
End: 2019-01-29
Payer: MEDICARE

## 2019-01-29 VITALS
SYSTOLIC BLOOD PRESSURE: 143 MMHG | WEIGHT: 180 LBS | HEART RATE: 74 BPM | DIASTOLIC BLOOD PRESSURE: 82 MMHG | BODY MASS INDEX: 29.99 KG/M2 | HEIGHT: 65 IN

## 2019-01-29 PROCEDURE — 31575 DIAGNOSTIC LARYNGOSCOPY: CPT

## 2019-01-29 PROCEDURE — 99213 OFFICE O/P EST LOW 20 MIN: CPT | Mod: 25

## 2019-01-29 NOTE — PROCEDURE
[Hoarseness] : hoarseness not clearly evaluated by indirect laryngoscopy [Topical Lidocaine] : topical lidocaine [Oxymetazoline HCl] : oxymetazoline HCl [Flexible Endoscope] : examined with the flexible endoscope [Serial Number: ___] : Serial Number: [unfilled] [Present] : absent [Lesion(s)] : no lesions [True Vocal Cords Unilateral Paralysis] : true vocal cord paralysis on the right [True Vocal Cords Erythematous] : no true vocal cord edema [True Vocal Cords Mobley's Nodules] : no true vocal cord nodules [Glottis Arytenoid Cartilages] : no arytenoid granulomas [Glottis Arytenoid Cartilages Erythema] : no arytenoid erythema [Normal] : posterior cricoid area had healthy pink mucosa in the interarytenoid area and the esophageal inlet

## 2019-01-29 NOTE — HISTORY OF PRESENT ILLNESS
[de-identified] : 62 year old female prior patient of Dr. Tompkins, for mass in left parapharyngeal space. States she has had the mass for over 15 years. S/p right parotid cancer about 15 years ago and the lesion was picked up on a f/u scan. Denies pain in mouth mass. Denies dysphagia, odynophagia, difficulty breathing, voice changes, difficulties with neck movement. States weight stable. \par

## 2019-01-29 NOTE — REASON FOR VISIT
[Subsequent Evaluation] : a subsequent evaluation for [FreeTextEntry2] : 1yr f/u left side parapharyngeal space mass

## 2019-01-29 NOTE — ASSESSMENT
[FreeTextEntry1] : Will obtain MRI to reassess L parapharyngeal mass.  If stable, we will continue to observe.

## 2019-01-29 NOTE — CONSULT LETTER
[Dear  ___] : Dear  [unfilled], [Courtesy Letter:] : I had the pleasure of seeing your patient, [unfilled], in my office today. [Please see my note below.] : Please see my note below. [Consult Closing:] : Thank you very much for allowing me to participate in the care of this patient.  If you have any questions, please do not hesitate to contact me. [Sincerely,] : Sincerely, [FreeTextEntry2] : Terrie Ortiz MD (Jeremy CHATMAN)\par  [FreeTextEntry3] : James Tompkins MD, FACS\par Clinical \par Dept. of Otolaryngology\par [facility]\par

## 2019-01-29 NOTE — PHYSICAL EXAM
[de-identified] : No mass palpable.  Right parotid scar present [Midline] : trachea located in midline position [Laryngoscopy Performed] : laryngoscopy was performed, see procedure section for findings [Normal] : no rashes [FreeTextEntry2] : Right facial synkinesis [de-identified] : Pt wheelchair bound but can stand with support [de-identified] : Facial synkinesis

## 2019-02-12 ENCOUNTER — CHART COPY (OUTPATIENT)
Age: 64
End: 2019-02-12

## 2019-03-01 ENCOUNTER — FORM ENCOUNTER (OUTPATIENT)
Age: 64
End: 2019-03-01

## 2019-03-02 ENCOUNTER — APPOINTMENT (OUTPATIENT)
Dept: MRI IMAGING | Facility: IMAGING CENTER | Age: 64
End: 2019-03-02
Payer: MEDICARE

## 2019-03-02 ENCOUNTER — OUTPATIENT (OUTPATIENT)
Dept: OUTPATIENT SERVICES | Facility: HOSPITAL | Age: 64
LOS: 1 days | End: 2019-03-02
Payer: MEDICARE

## 2019-03-02 DIAGNOSIS — C43.72 MALIGNANT MELANOMA OF LEFT LOWER LIMB, INCLUDING HIP: Chronic | ICD-10-CM

## 2019-03-02 DIAGNOSIS — C50.911 MALIGNANT NEOPLASM OF UNSPECIFIED SITE OF RIGHT FEMALE BREAST: Chronic | ICD-10-CM

## 2019-03-02 DIAGNOSIS — C07 MALIGNANT NEOPLASM OF PAROTID GLAND: Chronic | ICD-10-CM

## 2019-03-02 DIAGNOSIS — R22.1 LOCALIZED SWELLING, MASS AND LUMP, NECK: ICD-10-CM

## 2019-03-02 DIAGNOSIS — Z98.89 OTHER SPECIFIED POSTPROCEDURAL STATES: Chronic | ICD-10-CM

## 2019-03-02 DIAGNOSIS — H50.9 UNSPECIFIED STRABISMUS: Chronic | ICD-10-CM

## 2019-03-02 PROCEDURE — A9585: CPT

## 2019-03-02 PROCEDURE — 70543 MRI ORBT/FAC/NCK W/O &W/DYE: CPT | Mod: 26

## 2019-03-02 PROCEDURE — 70543 MRI ORBT/FAC/NCK W/O &W/DYE: CPT

## 2019-03-06 ENCOUNTER — OUTPATIENT (OUTPATIENT)
Dept: OUTPATIENT SERVICES | Facility: HOSPITAL | Age: 64
LOS: 1 days | Discharge: HOME | End: 2019-03-06

## 2019-03-06 DIAGNOSIS — C43.72 MALIGNANT MELANOMA OF LEFT LOWER LIMB, INCLUDING HIP: Chronic | ICD-10-CM

## 2019-03-06 DIAGNOSIS — C50.919 MALIGNANT NEOPLASM OF UNSPECIFIED SITE OF UNSPECIFIED FEMALE BREAST: ICD-10-CM

## 2019-03-06 DIAGNOSIS — Z98.89 OTHER SPECIFIED POSTPROCEDURAL STATES: Chronic | ICD-10-CM

## 2019-03-06 DIAGNOSIS — C07 MALIGNANT NEOPLASM OF PAROTID GLAND: Chronic | ICD-10-CM

## 2019-03-06 DIAGNOSIS — H50.9 UNSPECIFIED STRABISMUS: Chronic | ICD-10-CM

## 2019-03-06 DIAGNOSIS — C50.911 MALIGNANT NEOPLASM OF UNSPECIFIED SITE OF RIGHT FEMALE BREAST: Chronic | ICD-10-CM

## 2019-03-08 ENCOUNTER — RESULT REVIEW (OUTPATIENT)
Age: 64
End: 2019-03-08

## 2019-03-15 ENCOUNTER — APPOINTMENT (OUTPATIENT)
Dept: HEMATOLOGY ONCOLOGY | Facility: CLINIC | Age: 64
End: 2019-03-15
Payer: MEDICARE

## 2019-03-15 ENCOUNTER — APPOINTMENT (OUTPATIENT)
Dept: INFUSION THERAPY | Facility: HOSPITAL | Age: 64
End: 2019-03-15

## 2019-03-15 VITALS
BODY MASS INDEX: 30.12 KG/M2 | HEART RATE: 75 BPM | OXYGEN SATURATION: 96 % | SYSTOLIC BLOOD PRESSURE: 148 MMHG | WEIGHT: 181 LBS | DIASTOLIC BLOOD PRESSURE: 84 MMHG | RESPIRATION RATE: 16 BRPM | TEMPERATURE: 208.4 F

## 2019-03-15 PROCEDURE — 99214 OFFICE O/P EST MOD 30 MIN: CPT

## 2019-03-15 RX ORDER — CILOSTAZOL 50 MG/1
50 TABLET ORAL
Qty: 60 | Refills: 6 | Status: DISCONTINUED | COMMUNITY
Start: 2018-06-20 | End: 2019-03-15

## 2019-03-15 NOTE — REVIEW OF SYSTEMS
[Skin Rash] : no skin rash [Skin Wound] : no skin wound [Negative] : Allergic/Immunologic [de-identified] : occasional tenderness over the breast reconstruction site

## 2019-03-15 NOTE — PHYSICAL EXAM
[Restricted in physically strenuous activity but ambulatory and able to carry out work of a light or sedentary nature] : Status 1- Restricted in physically strenuous activity but ambulatory and able to carry out work of a light or sedentary nature, e.g., light house work, office work [Normal] : affect appropriate [de-identified] : facial droop chronic  [de-identified] : bilateral reconstruction with post surgical scar R axilla  [de-identified] : ambulating with B crutches with L leg brace  [de-identified] : able to get up and switch over the crutches

## 2019-03-15 NOTE — ASSESSMENT
[FreeTextEntry1] : She is a 64 y/o F with recurrent lobular right breast cancer that recurred 3 years off endocrine therapy. She has been on letrozole since 1/2015. She continues to tolerate letrozole and has no new signs or symptoms of breast cancer recurrence. She will have bone density to review bone health: encourage calcium with Vitamin D. We reviewed ambulation: has been off Xarelto calf DVT 2017 and on medications: ASA and cilastozol for arterial insufficiency. She will continue with annual surveillance and will return sooner if any new symptoms.

## 2019-03-15 NOTE — CONSULT LETTER
[Dear  ___] : Dear  [unfilled], [Courtesy Letter:] : I had the pleasure of seeing your patient, [unfilled], in my office today. [Please see my note below.] : Please see my note below. [Consult Closing:] : Thank you very much for allowing me to participate in the care of this patient.  If you have any questions, please do not hesitate to contact me. [Sincerely,] : Sincerely, [FreeTextEntry2] : Susan Palleschi MD\par [address] [facility] [address]\par [place] [id number] [FreeTextEntry3] : Tito Norton MD\par Attending\par [facility]\par

## 2019-03-15 NOTE — HISTORY OF PRESENT ILLNESS
[Disease: _____________________] : Disease: [unfilled] [de-identified] : She was initially diagnosed with breast cancer at age 52 with multifocal right breast cancer.  She had bilateral mastectomies and sentinel lymph node biopsy.  She had in the right breast a 1.5 cm infiltrating lobular carcinoma with negative sentinel lymph nodes.  The ER was 99%, OH was 99%, Jis1dfb was negative.  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  She received for Stage I breast cancer: AC followed by T dose dense from 6/2007 to 10/2007.  She was then placed on tamoxifen from 2007 and switched to anastrozole to complete 5 years of treatment on 12/1/2012.  On follow up in 2014, she had new palpable right breast finding and biopsy confirmed breast cancer.  On 12/3/14, she had right breast mass excision with axillary lymph node dissection.  The pathology showed invasive lobular carcinoma involving the dermis and superficial subcutaneous tissue along with 1 out of 15 lymph nodes involved by carcinoma.  The ER was 95%, OH was 95%, Ory7flm was CISH negative. She was started on letrozole. She had worsening back pain from spinal stenosis despite physical therapy. She had L4-5 posterior interbody fusion on 3/2017. She developed postoperative R calf DVT and was started initially on warfarin then Xarelto which was stopped after anticoagulation course and physical therapy to improve mobility.  [de-identified] : invasive lobular ER 95%, UT 95%, [id number] CISH negative [de-identified] : dd ACT 6/2007 to 10/2007\par tamoxifen then anastrozole 2007 to 12/2012\par Letrozole 1/2015 to present  [de-identified] : She had last bone density 2 years ago. She denies any new bone pain or cough or SOB. She denies any new medications: has been taking medication for radicular back pain. Able to use crutches and walking with them. She is more mobile. She had MRI of the orbit and the neck without any new evidence of disease. Has occasional chest wall discomfort when her arm brushes up against reconstruction. Only lasts for few minutes. She denies any new skin changes. No pain over port site. She is flushing port every 6 to 8 weeks and still wants to keep port for access.

## 2019-03-21 ENCOUNTER — NON-APPOINTMENT (OUTPATIENT)
Age: 64
End: 2019-03-21

## 2019-03-21 ENCOUNTER — APPOINTMENT (OUTPATIENT)
Dept: INTERNAL MEDICINE | Facility: CLINIC | Age: 64
End: 2019-03-21
Payer: MEDICARE

## 2019-03-21 VITALS
DIASTOLIC BLOOD PRESSURE: 80 MMHG | HEIGHT: 65 IN | SYSTOLIC BLOOD PRESSURE: 130 MMHG | HEART RATE: 78 BPM | BODY MASS INDEX: 30.66 KG/M2 | WEIGHT: 184 LBS | OXYGEN SATURATION: 95 %

## 2019-03-21 PROCEDURE — 90732 PPSV23 VACC 2 YRS+ SUBQ/IM: CPT

## 2019-03-21 PROCEDURE — 93000 ELECTROCARDIOGRAM COMPLETE: CPT

## 2019-03-21 PROCEDURE — G0439: CPT

## 2019-03-21 PROCEDURE — 99214 OFFICE O/P EST MOD 30 MIN: CPT

## 2019-03-21 PROCEDURE — G0009: CPT

## 2019-03-26 ENCOUNTER — APPOINTMENT (OUTPATIENT)
Dept: OBGYN | Facility: CLINIC | Age: 64
End: 2019-03-26
Payer: MEDICARE

## 2019-03-26 VITALS
DIASTOLIC BLOOD PRESSURE: 100 MMHG | SYSTOLIC BLOOD PRESSURE: 160 MMHG | WEIGHT: 180 LBS | BODY MASS INDEX: 29.99 KG/M2 | HEIGHT: 65 IN

## 2019-03-26 PROCEDURE — 58100 BIOPSY OF UTERUS LINING: CPT

## 2019-03-26 NOTE — HISTORY OF PRESENT ILLNESS
[de-identified] : 62 y/o F here for f/u\par Onc: Saw oncology, no change in regimen\par GERD: Controlled with Zantac/Omeprazole\par c/o occasional chest pain. Substernal, lasts seconds, occurred 3x over the past 3 mos.\par No n/v/d/p/neck pain assocaited with it. She attributes this to stress , as during those times she has been having family stressors.\par HEENT: Dr. Tompkins ordered MRI on neck earlier this month. Stable parapharyngeal mass.\par Anxiety: Sees a therapist\par Needs to see gyn this month\par Will be seeing plastic surgeon soon (breast)\par Will be seeing Tae Esqueda and Kevin\par \par \par

## 2019-03-26 NOTE — PROCEDURE
[Endometrial Biopsy] : Endometrial biopsy [Abnormal US] : abnormal ultrasound findings [Post-Menop. Bleeding] : post-menopausal bleeding [Risks] : risks [Benefits] : benefits [Patient] : patient [Infection] : infection [Bleeding] : bleeding [Allergic Reaction] : allergic reaction [Uterine Perforation] : uterine perforation [Pain] : pain [CONSENT OBTAINED] : written consent was obtained prior to the procedure. [No Premedication] : No premedication [Betadine] : Betadine [None] : none [Tenaculum] : a single toothed tenaculum [Easy Passage] : allowed easy passage of a uterine sound without dilation [Sounded to ___ cm] : sounded to [unfilled] ~Ucm [Retroverted] : retroverted [Explora-Curette] : an Explora-Curette [Scant] : a scant [Sent to Histology] : the specimen was place in buffered formalin and sent for pathlogy [Tolerated Well] : the patient tolerated the procedure well [No Complications] : there were no complications [de-identified] : endometrium 2mm but  cystic

## 2019-03-26 NOTE — REVIEW OF SYSTEMS
[Palpitations] : no palpitations [Lower Ext Edema] : no lower extremity edema [Negative] : Gastrointestinal [FreeTextEntry5] : see hpi

## 2019-03-26 NOTE — PLAN
[FreeTextEntry1] : 62 y/o F here for f/u\par Atypical CP: EKG\par Elevated BP: Was on anti-HTN meds a few years ago.  Was d/c after she lost weight and had normal bp off meds.\par Advised to watch salt intake and diet and we will recheck at next visit\par GERD: c/w regimen, dietary changes advised\par HCM: Pneumococcal 23 vaccine booster today\par rto 9/19 for cpe\par Reviewed recent labs, acceptable

## 2019-03-26 NOTE — PHYSICAL EXAM
[No Acute Distress] : no acute distress [Well Nourished] : well nourished [Well Developed] : well developed [Normal Sclera/Conjunctiva] : normal sclera/conjunctiva [PERRL] : pupils equal round and reactive to light [EOMI] : extraocular movements intact [Normal Outer Ear/Nose] : the outer ears and nose were normal in appearance [Normal Oropharynx] : the oropharynx was normal [Normal TMs] : both tympanic membranes were normal [No JVD] : no jugular venous distention [Supple] : supple [No Lymphadenopathy] : no lymphadenopathy [No Respiratory Distress] : no respiratory distress  [Clear to Auscultation] : lungs were clear to auscultation bilaterally [No Accessory Muscle Use] : no accessory muscle use [Normal Rate] : normal rate  [Regular Rhythm] : with a regular rhythm [Normal S1, S2] : normal S1 and S2 [No Edema] : there was no peripheral edema [Soft] : abdomen soft [Non Tender] : non-tender [No HSM] : no HSM [Normal Supraclavicular Nodes] : no supraclavicular lymphadenopathy [Normal Anterior Cervical Nodes] : no anterior cervical lymphadenopathy [No CVA Tenderness] : no CVA  tenderness [No Spinal Tenderness] : no spinal tenderness

## 2019-04-04 ENCOUNTER — FORM ENCOUNTER (OUTPATIENT)
Age: 64
End: 2019-04-04

## 2019-04-05 ENCOUNTER — OUTPATIENT (OUTPATIENT)
Dept: OUTPATIENT SERVICES | Facility: HOSPITAL | Age: 64
LOS: 1 days | End: 2019-04-05
Payer: MEDICARE

## 2019-04-05 ENCOUNTER — APPOINTMENT (OUTPATIENT)
Dept: RADIOLOGY | Facility: IMAGING CENTER | Age: 64
End: 2019-04-05
Payer: MEDICARE

## 2019-04-05 ENCOUNTER — APPOINTMENT (OUTPATIENT)
Dept: RADIATION ONCOLOGY | Facility: CLINIC | Age: 64
End: 2019-04-05
Payer: MEDICARE

## 2019-04-05 VITALS
OXYGEN SATURATION: 97 % | RESPIRATION RATE: 14 BRPM | TEMPERATURE: 97.52 F | SYSTOLIC BLOOD PRESSURE: 164 MMHG | WEIGHT: 181.77 LBS | DIASTOLIC BLOOD PRESSURE: 90 MMHG | HEART RATE: 69 BPM | BODY MASS INDEX: 30.25 KG/M2

## 2019-04-05 DIAGNOSIS — H50.9 UNSPECIFIED STRABISMUS: Chronic | ICD-10-CM

## 2019-04-05 DIAGNOSIS — C50.911 MALIGNANT NEOPLASM OF UNSPECIFIED SITE OF RIGHT FEMALE BREAST: Chronic | ICD-10-CM

## 2019-04-05 DIAGNOSIS — C43.72 MALIGNANT MELANOMA OF LEFT LOWER LIMB, INCLUDING HIP: Chronic | ICD-10-CM

## 2019-04-05 DIAGNOSIS — C50.919 MALIGNANT NEOPLASM OF UNSPECIFIED SITE OF UNSPECIFIED FEMALE BREAST: ICD-10-CM

## 2019-04-05 DIAGNOSIS — C07 MALIGNANT NEOPLASM OF PAROTID GLAND: Chronic | ICD-10-CM

## 2019-04-05 DIAGNOSIS — Z98.89 OTHER SPECIFIED POSTPROCEDURAL STATES: Chronic | ICD-10-CM

## 2019-04-05 PROCEDURE — 77080 DXA BONE DENSITY AXIAL: CPT

## 2019-04-05 PROCEDURE — 99213 OFFICE O/P EST LOW 20 MIN: CPT

## 2019-04-05 PROCEDURE — 77080 DXA BONE DENSITY AXIAL: CPT | Mod: 26

## 2019-04-05 NOTE — REVIEW OF SYSTEMS
[Negative] : Allergic/Immunologic [Fatigue: Grade 0] : Fatigue: Grade 0 [Breast Pain: Grade 0] : Breast Pain: Grade 0 [Skin Hyperpigmentation: Grade 0] : Skin Hyperpigmentation: Grade 0 [Dermatitis Radiation: Grade 0] : Dermatitis Radiation: Grade 0

## 2019-04-05 NOTE — VITALS
[Least Pain Intensity: 0/10] : 0/10 [80: Normal activity with effort; some signs or symptoms of disease.] : 80: Normal activity with effort; some signs or symptoms of disease.  [ECOG Performance Status: 2 - Ambulatory and capable of all self care but unable to carry out any work activities] : Performance Status: 2 - Ambulatory and capable of all self care but unable to carry out any work activities. Up and about more than 50% of waking hours [Maximal Pain Intensity: 3/10] : 3/10 [Pain Description/Quality: ___] : Pain description/quality: [unfilled] [Pain Duration: ___] : Pain duration: [unfilled] [Pain Location: ___] : Pain Location: [unfilled]

## 2019-04-08 ENCOUNTER — CLINICAL ADVICE (OUTPATIENT)
Age: 64
End: 2019-04-08

## 2019-04-10 ENCOUNTER — RX RENEWAL (OUTPATIENT)
Age: 64
End: 2019-04-10

## 2019-04-10 NOTE — HISTORY OF PRESENT ILLNESS
[FreeTextEntry1] : Ms. Ocampo is a 63 y F with recurrent lobular carcinoma of the R breast who received radiotherapy s/p resection and lymph node sampling for recurrent breast cancer, ER/NV+, [id number] -. She was treated with high tangents to right reconstructed chest wall with a boost to tumor bed to 50 Gy/25 fractionst then boost of 10 Gy/2 fractions. She completed treatment on 05/15/15. Expanders removed 7/7/16. She is currently on letrozole since 1/2015. \par \par Patient completed an MRI on 8/2/17 status post L4-5 posterior lumbar interbody fusion laminectomy (3/23/17) with good posterior decompression of the spinal canal. There is a minimal anterolisthesis of L4 upon L5. As per chart, post-operatively, the patient developed a R calf DVT and was treated with warfarin, now on Xarelto. \par \par Interval History: \par She presents today for follow up. She reports sharp pains to the right breast that come and go especially when she turns to the right. This does not require any analgesic. She endorses tenderness to the 6 o clock of the right breast especially with palpation. She has a follow up with the surgeon next month. She has started to walk with crutches and no longer using rollator. Continues on letrozole. Denies any hot flashes. Continues to follow up with Dr. Norton.

## 2019-04-10 NOTE — DISEASE MANAGEMENT
[Pathological] : TNM Stage: p [N/A] : Currently not applicable [FreeTextEntry4] : recurrent breast cancer [TTNM] : x [NTNM] : x [MTNM] : x

## 2019-04-10 NOTE — PHYSICAL EXAM
[] : no respiratory distress [Respiration, Rhythm And Depth] : normal respiratory rhythm and effort [Exaggerated Use Of Accessory Muscles For Inspiration] : no accessory muscle use [Normal] : oriented to person, place and time, the affect was normal, the mood was normal and not anxious [de-identified] : left and right reconstruction with implants, well healed, no masses or edema, skin soft without nodule or erythema, no axillary mass tenderness "inframammary" fold on right at about 6 oclock

## 2019-04-11 ENCOUNTER — FORM ENCOUNTER (OUTPATIENT)
Age: 64
End: 2019-04-11

## 2019-04-12 ENCOUNTER — APPOINTMENT (OUTPATIENT)
Dept: CT IMAGING | Facility: IMAGING CENTER | Age: 64
End: 2019-04-12
Payer: MEDICARE

## 2019-04-12 ENCOUNTER — OUTPATIENT (OUTPATIENT)
Dept: OUTPATIENT SERVICES | Facility: HOSPITAL | Age: 64
LOS: 1 days | End: 2019-04-12
Payer: MEDICARE

## 2019-04-12 DIAGNOSIS — C43.72 MALIGNANT MELANOMA OF LEFT LOWER LIMB, INCLUDING HIP: Chronic | ICD-10-CM

## 2019-04-12 DIAGNOSIS — C50.911 MALIGNANT NEOPLASM OF UNSPECIFIED SITE OF RIGHT FEMALE BREAST: Chronic | ICD-10-CM

## 2019-04-12 DIAGNOSIS — I10 ESSENTIAL (PRIMARY) HYPERTENSION: ICD-10-CM

## 2019-04-12 DIAGNOSIS — Z98.89 OTHER SPECIFIED POSTPROCEDURAL STATES: Chronic | ICD-10-CM

## 2019-04-12 DIAGNOSIS — R07.9 CHEST PAIN, UNSPECIFIED: ICD-10-CM

## 2019-04-12 DIAGNOSIS — H50.9 UNSPECIFIED STRABISMUS: Chronic | ICD-10-CM

## 2019-04-12 DIAGNOSIS — C07 MALIGNANT NEOPLASM OF PAROTID GLAND: Chronic | ICD-10-CM

## 2019-04-12 PROCEDURE — 71250 CT THORAX DX C-: CPT

## 2019-04-12 PROCEDURE — 71250 CT THORAX DX C-: CPT | Mod: 26

## 2019-04-16 ENCOUNTER — APPOINTMENT (OUTPATIENT)
Dept: OPHTHALMOLOGY | Facility: CLINIC | Age: 64
End: 2019-04-16
Payer: MEDICARE

## 2019-04-16 PROCEDURE — 92012 INTRM OPH EXAM EST PATIENT: CPT

## 2019-04-23 LAB — CORE LAB BIOPSY: NORMAL

## 2019-04-30 ENCOUNTER — OUTPATIENT (OUTPATIENT)
Dept: OUTPATIENT SERVICES | Facility: HOSPITAL | Age: 64
LOS: 1 days | Discharge: HOME | End: 2019-04-30

## 2019-04-30 DIAGNOSIS — C43.72 MALIGNANT MELANOMA OF LEFT LOWER LIMB, INCLUDING HIP: Chronic | ICD-10-CM

## 2019-04-30 DIAGNOSIS — C50.919 MALIGNANT NEOPLASM OF UNSPECIFIED SITE OF UNSPECIFIED FEMALE BREAST: ICD-10-CM

## 2019-04-30 DIAGNOSIS — C50.911 MALIGNANT NEOPLASM OF UNSPECIFIED SITE OF RIGHT FEMALE BREAST: Chronic | ICD-10-CM

## 2019-04-30 DIAGNOSIS — Z98.89 OTHER SPECIFIED POSTPROCEDURAL STATES: Chronic | ICD-10-CM

## 2019-04-30 DIAGNOSIS — H50.9 UNSPECIFIED STRABISMUS: Chronic | ICD-10-CM

## 2019-04-30 DIAGNOSIS — C07 MALIGNANT NEOPLASM OF PAROTID GLAND: Chronic | ICD-10-CM

## 2019-05-03 ENCOUNTER — LABORATORY RESULT (OUTPATIENT)
Age: 64
End: 2019-05-03

## 2019-05-03 ENCOUNTER — RESULT REVIEW (OUTPATIENT)
Age: 64
End: 2019-05-03

## 2019-05-03 ENCOUNTER — APPOINTMENT (OUTPATIENT)
Dept: INFUSION THERAPY | Facility: HOSPITAL | Age: 64
End: 2019-05-03

## 2019-05-03 LAB
BASOPHILS # BLD AUTO: 0 K/UL — SIGNIFICANT CHANGE UP (ref 0–0.2)
BASOPHILS NFR BLD AUTO: 0.2 % — SIGNIFICANT CHANGE UP (ref 0–2)
EOSINOPHIL # BLD AUTO: 0 K/UL — SIGNIFICANT CHANGE UP (ref 0–0.5)
EOSINOPHIL NFR BLD AUTO: 0.9 % — SIGNIFICANT CHANGE UP (ref 0–6)
HCT VFR BLD CALC: 36.7 % — SIGNIFICANT CHANGE UP (ref 34.5–45)
HGB BLD-MCNC: 12.9 G/DL — SIGNIFICANT CHANGE UP (ref 11.5–15.5)
LYMPHOCYTES # BLD AUTO: 1.2 K/UL — SIGNIFICANT CHANGE UP (ref 1–3.3)
LYMPHOCYTES # BLD AUTO: 23.6 % — SIGNIFICANT CHANGE UP (ref 13–44)
MCHC RBC-ENTMCNC: 33.3 PG — SIGNIFICANT CHANGE UP (ref 27–34)
MCHC RBC-ENTMCNC: 35.2 G/DL — SIGNIFICANT CHANGE UP (ref 32–36)
MCV RBC AUTO: 94.6 FL — SIGNIFICANT CHANGE UP (ref 80–100)
MONOCYTES # BLD AUTO: 0.2 K/UL — SIGNIFICANT CHANGE UP (ref 0–0.9)
MONOCYTES NFR BLD AUTO: 4.6 % — SIGNIFICANT CHANGE UP (ref 2–14)
NEUTROPHILS # BLD AUTO: 3.7 K/UL — SIGNIFICANT CHANGE UP (ref 1.8–7.4)
NEUTROPHILS NFR BLD AUTO: 70.6 % — SIGNIFICANT CHANGE UP (ref 43–77)
PLATELET # BLD AUTO: 219 K/UL — SIGNIFICANT CHANGE UP (ref 150–400)
RBC # BLD: 3.88 M/UL — SIGNIFICANT CHANGE UP (ref 3.8–5.2)
RBC # FLD: 11.6 % — SIGNIFICANT CHANGE UP (ref 10.3–14.5)
WBC # BLD: 5.2 K/UL — SIGNIFICANT CHANGE UP (ref 3.8–10.5)
WBC # FLD AUTO: 5.2 K/UL — SIGNIFICANT CHANGE UP (ref 3.8–10.5)

## 2019-06-03 ENCOUNTER — APPOINTMENT (OUTPATIENT)
Dept: GASTROENTEROLOGY | Facility: CLINIC | Age: 64
End: 2019-06-03
Payer: MEDICARE

## 2019-06-03 VITALS
SYSTOLIC BLOOD PRESSURE: 130 MMHG | TEMPERATURE: 208.04 F | BODY MASS INDEX: 30.16 KG/M2 | HEART RATE: 81 BPM | HEIGHT: 65 IN | DIASTOLIC BLOOD PRESSURE: 94 MMHG | OXYGEN SATURATION: 98 % | WEIGHT: 181 LBS | RESPIRATION RATE: 15 BRPM

## 2019-06-03 DIAGNOSIS — K21.9 GASTRO-ESOPHAGEAL REFLUX DISEASE W/OUT ESOPHAGITIS: ICD-10-CM

## 2019-06-03 PROCEDURE — 99213 OFFICE O/P EST LOW 20 MIN: CPT

## 2019-06-03 NOTE — ASSESSMENT
[FreeTextEntry1] : This is a 64-year-old female with chronic GERD. She is to start tapering the omeprazole by taking 20 mg daily and tapering slowly, monthly, by taking it every other day to every 3 days. She will continue on Zantac 150 mg at bedtime. She is to call me she's questions or concerns.

## 2019-06-03 NOTE — PHYSICAL EXAM
[General Appearance - Alert] : alert [General Appearance - In No Acute Distress] : in no acute distress [Heart Rate And Rhythm] : heart rate was normal and rhythm regular [Auscultation Breath Sounds / Voice Sounds] : lungs were clear to auscultation bilaterally [Heart Sounds] : normal S1 and S2 [Heart Sounds Gallop] : no gallops [Murmurs] : no murmurs [Heart Sounds Pericardial Friction Rub] : no pericardial rub [Edema] : there was no peripheral edema [Bowel Sounds] : normal bowel sounds [Abdomen Soft] : soft [Abdomen Tenderness] : non-tender [Abdomen Mass (___ Cm)] : no abdominal mass palpated [] : no hepato-splenomegaly [No Focal Deficits] : no focal deficits [Skin Color & Pigmentation] : normal skin color and pigmentation

## 2019-06-03 NOTE — HISTORY OF PRESENT ILLNESS
[FreeTextEntry1] : Lucrecia Presents for a followup visit. She states it. She denies abdominal pain, nausea or vomiting. She denies changes in her bowel habits. Her heartburn and regurgitation are well maintained on omeprazole 40 mg in the morning and Zantac 150 milligrams at bedtime. No dysphagia or odynophagia.

## 2019-06-07 ENCOUNTER — OTHER (OUTPATIENT)
Age: 64
End: 2019-06-07

## 2019-06-19 ENCOUNTER — APPOINTMENT (OUTPATIENT)
Dept: VASCULAR SURGERY | Facility: CLINIC | Age: 64
End: 2019-06-19

## 2019-06-28 ENCOUNTER — OUTPATIENT (OUTPATIENT)
Dept: OUTPATIENT SERVICES | Facility: HOSPITAL | Age: 64
LOS: 1 days | Discharge: HOME | End: 2019-06-28

## 2019-06-28 DIAGNOSIS — C50.919 MALIGNANT NEOPLASM OF UNSPECIFIED SITE OF UNSPECIFIED FEMALE BREAST: ICD-10-CM

## 2019-06-28 DIAGNOSIS — H50.9 UNSPECIFIED STRABISMUS: Chronic | ICD-10-CM

## 2019-06-28 DIAGNOSIS — C07 MALIGNANT NEOPLASM OF PAROTID GLAND: Chronic | ICD-10-CM

## 2019-06-28 DIAGNOSIS — Z98.89 OTHER SPECIFIED POSTPROCEDURAL STATES: Chronic | ICD-10-CM

## 2019-06-28 DIAGNOSIS — C50.911 MALIGNANT NEOPLASM OF UNSPECIFIED SITE OF RIGHT FEMALE BREAST: Chronic | ICD-10-CM

## 2019-06-28 DIAGNOSIS — C43.72 MALIGNANT MELANOMA OF LEFT LOWER LIMB, INCLUDING HIP: Chronic | ICD-10-CM

## 2019-07-02 ENCOUNTER — APPOINTMENT (OUTPATIENT)
Dept: VASCULAR SURGERY | Facility: CLINIC | Age: 64
End: 2019-07-02
Payer: MEDICARE

## 2019-07-02 VITALS
DIASTOLIC BLOOD PRESSURE: 83 MMHG | HEIGHT: 65 IN | TEMPERATURE: 208.4 F | BODY MASS INDEX: 30.16 KG/M2 | HEART RATE: 80 BPM | SYSTOLIC BLOOD PRESSURE: 136 MMHG | WEIGHT: 181 LBS

## 2019-07-02 PROCEDURE — 93923 UPR/LXTR ART STDY 3+ LVLS: CPT

## 2019-07-02 PROCEDURE — 99214 OFFICE O/P EST MOD 30 MIN: CPT

## 2019-07-02 NOTE — ASSESSMENT
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

## 2019-07-02 NOTE — HISTORY OF PRESENT ILLNESS
[FreeTextEntry1] : pt is s/p spinal stenosis surgery pt is receiving rehab\par since this surgery pt c/o thi foot numbness \par pt c/o nocturnal leg and foot cramps 1-2x/week\par intensity  mod to severe and currently worsening \par worse w lying in bed \par onset sev years ago w recent worsening \par pt states that she is in wheelchair and transfers  and can only ambulate w walker w difficulty  [de-identified] : pt is on pletal 50 bid \par Pt states since last ov  thi  leg and foot nocturnal cramps 1-2x/night have improved in intensity \par now only mild since last ov \par pt c/o ongoing leg shooting pain\par

## 2019-07-02 NOTE — PHYSICAL EXAM
[Normal Breath Sounds] : Normal breath sounds [2+] : left 2+ [1+] : left 1+ [Ankle Swelling Bilaterally] : bilaterally  [Ankle Swelling (On Exam)] : present [Varicose Veins Of Lower Extremities] : bilaterally [] : bilaterally [Ankle Swelling On The Right] : mild [No HSM] : no hepatosplenomegaly [No Rash or Lesion] : No rash or lesion [Alert] : alert [Oriented to Place] : oriented to place [Oriented to Person] : oriented to person [Calm] : calm [Oriented to Time] : oriented to time [JVD] : no jugular venous distention  [Right Carotid Bruit] : no bruit heard over the right carotid [Left Carotid Bruit] : no bruit heard over the left carotid [Abdomen Masses] : No abdominal masses [Tender] : was nontender [Stool Sample Taken] : No stool obtained  on rectal exam [de-identified] : nad [de-identified] : wnl [FreeTextEntry1] : mild to mod art insuff w mod trophic skin changes \par mild venous insufff w mild  thi le edema and mild st dermatitis from knee distally \par multiple small v veins and spider v thi shins and ankles  [de-identified] : wnl [de-identified] : in wheelchair  [de-identified] : cn 2-12 thi grossly intact  [de-identified] : cooperative

## 2019-07-02 NOTE — DATA REVIEWED
[FreeTextEntry1] : 6/20/2018 AID patent AA and thi iliac arterial systems  no sig stenosis\par \par 6/20/2018 BELEM/PVR mild to mod infragenic art insuff w vessel calcification  Rt BELEM 1.21 lt BELEM 1.17\par \par 7/2/2019 BELEM/PVR mild to mod infragenic art insuff w vessel calcification  Rt BELEM 1.19 lt BELEM 1.19\par

## 2019-07-05 ENCOUNTER — CHART COPY (OUTPATIENT)
Age: 64
End: 2019-07-05

## 2019-07-09 ENCOUNTER — APPOINTMENT (OUTPATIENT)
Dept: INFUSION THERAPY | Facility: HOSPITAL | Age: 64
End: 2019-07-09

## 2019-08-05 ENCOUNTER — OUTPATIENT (OUTPATIENT)
Dept: OUTPATIENT SERVICES | Facility: HOSPITAL | Age: 64
LOS: 1 days | End: 2019-08-05
Payer: MEDICARE

## 2019-08-05 ENCOUNTER — APPOINTMENT (OUTPATIENT)
Dept: MRI IMAGING | Facility: IMAGING CENTER | Age: 64
End: 2019-08-05
Payer: MEDICARE

## 2019-08-05 DIAGNOSIS — H50.9 UNSPECIFIED STRABISMUS: Chronic | ICD-10-CM

## 2019-08-05 DIAGNOSIS — C50.311 MALIGNANT NEOPLASM OF LOWER-INNER QUADRANT OF RIGHT FEMALE BREAST: ICD-10-CM

## 2019-08-05 DIAGNOSIS — C43.72 MALIGNANT MELANOMA OF LEFT LOWER LIMB, INCLUDING HIP: Chronic | ICD-10-CM

## 2019-08-05 DIAGNOSIS — Z98.89 OTHER SPECIFIED POSTPROCEDURAL STATES: Chronic | ICD-10-CM

## 2019-08-05 DIAGNOSIS — C07 MALIGNANT NEOPLASM OF PAROTID GLAND: Chronic | ICD-10-CM

## 2019-08-05 DIAGNOSIS — C50.911 MALIGNANT NEOPLASM OF UNSPECIFIED SITE OF RIGHT FEMALE BREAST: Chronic | ICD-10-CM

## 2019-08-05 PROCEDURE — 77049 MRI BREAST C-+ W/CAD BI: CPT | Mod: 26

## 2019-08-05 PROCEDURE — C8937: CPT

## 2019-08-05 PROCEDURE — C8908: CPT

## 2019-08-05 PROCEDURE — A9585: CPT

## 2019-08-13 ENCOUNTER — FORM ENCOUNTER (OUTPATIENT)
Age: 64
End: 2019-08-13

## 2019-08-14 ENCOUNTER — APPOINTMENT (OUTPATIENT)
Dept: OBGYN | Facility: CLINIC | Age: 64
End: 2019-08-14
Payer: MEDICARE

## 2019-08-14 PROCEDURE — 99203 OFFICE O/P NEW LOW 30 MIN: CPT

## 2019-08-14 PROCEDURE — 99213 OFFICE O/P EST LOW 20 MIN: CPT

## 2019-08-17 ENCOUNTER — OUTPATIENT (OUTPATIENT)
Dept: OUTPATIENT SERVICES | Facility: HOSPITAL | Age: 64
LOS: 1 days | End: 2019-08-17
Payer: MEDICARE

## 2019-08-17 ENCOUNTER — APPOINTMENT (OUTPATIENT)
Dept: MRI IMAGING | Facility: IMAGING CENTER | Age: 64
End: 2019-08-17
Payer: MEDICARE

## 2019-08-17 DIAGNOSIS — C43.72 MALIGNANT MELANOMA OF LEFT LOWER LIMB, INCLUDING HIP: Chronic | ICD-10-CM

## 2019-08-17 DIAGNOSIS — H50.9 UNSPECIFIED STRABISMUS: Chronic | ICD-10-CM

## 2019-08-17 DIAGNOSIS — C07 MALIGNANT NEOPLASM OF PAROTID GLAND: Chronic | ICD-10-CM

## 2019-08-17 DIAGNOSIS — C50.911 MALIGNANT NEOPLASM OF UNSPECIFIED SITE OF RIGHT FEMALE BREAST: Chronic | ICD-10-CM

## 2019-08-17 DIAGNOSIS — Z98.89 OTHER SPECIFIED POSTPROCEDURAL STATES: Chronic | ICD-10-CM

## 2019-08-17 DIAGNOSIS — M43.16 SPONDYLOLISTHESIS, LUMBAR REGION: ICD-10-CM

## 2019-08-17 PROCEDURE — 72158 MRI LUMBAR SPINE W/O & W/DYE: CPT

## 2019-08-17 PROCEDURE — 72158 MRI LUMBAR SPINE W/O & W/DYE: CPT | Mod: 26

## 2019-08-17 PROCEDURE — A9585: CPT

## 2019-08-19 ENCOUNTER — FORM ENCOUNTER (OUTPATIENT)
Age: 64
End: 2019-08-19

## 2019-09-10 ENCOUNTER — OUTPATIENT (OUTPATIENT)
Dept: OUTPATIENT SERVICES | Facility: HOSPITAL | Age: 64
LOS: 1 days | Discharge: HOME | End: 2019-09-10

## 2019-09-10 DIAGNOSIS — C50.911 MALIGNANT NEOPLASM OF UNSPECIFIED SITE OF RIGHT FEMALE BREAST: Chronic | ICD-10-CM

## 2019-09-10 DIAGNOSIS — C07 MALIGNANT NEOPLASM OF PAROTID GLAND: Chronic | ICD-10-CM

## 2019-09-10 DIAGNOSIS — C50.919 MALIGNANT NEOPLASM OF UNSPECIFIED SITE OF UNSPECIFIED FEMALE BREAST: ICD-10-CM

## 2019-09-10 DIAGNOSIS — C43.72 MALIGNANT MELANOMA OF LEFT LOWER LIMB, INCLUDING HIP: Chronic | ICD-10-CM

## 2019-09-10 DIAGNOSIS — H50.9 UNSPECIFIED STRABISMUS: Chronic | ICD-10-CM

## 2019-09-10 DIAGNOSIS — Z98.89 OTHER SPECIFIED POSTPROCEDURAL STATES: Chronic | ICD-10-CM

## 2019-09-13 ENCOUNTER — APPOINTMENT (OUTPATIENT)
Dept: HEMATOLOGY ONCOLOGY | Facility: CLINIC | Age: 64
End: 2019-09-13
Payer: MEDICARE

## 2019-09-13 ENCOUNTER — RESULT REVIEW (OUTPATIENT)
Age: 64
End: 2019-09-13

## 2019-09-13 ENCOUNTER — LABORATORY RESULT (OUTPATIENT)
Age: 64
End: 2019-09-13

## 2019-09-13 ENCOUNTER — APPOINTMENT (OUTPATIENT)
Dept: INFUSION THERAPY | Facility: HOSPITAL | Age: 64
End: 2019-09-13

## 2019-09-13 VITALS
DIASTOLIC BLOOD PRESSURE: 89 MMHG | HEART RATE: 85 BPM | OXYGEN SATURATION: 98 % | RESPIRATION RATE: 16 BRPM | SYSTOLIC BLOOD PRESSURE: 154 MMHG | TEMPERATURE: 98.1 F | BODY MASS INDEX: 29.35 KG/M2 | WEIGHT: 176.37 LBS

## 2019-09-13 LAB
BASOPHILS # BLD AUTO: 0 K/UL — SIGNIFICANT CHANGE UP (ref 0–0.2)
BASOPHILS NFR BLD AUTO: 0.3 % — SIGNIFICANT CHANGE UP (ref 0–2)
EOSINOPHIL # BLD AUTO: 0.1 K/UL — SIGNIFICANT CHANGE UP (ref 0–0.5)
EOSINOPHIL NFR BLD AUTO: 1.2 % — SIGNIFICANT CHANGE UP (ref 0–6)
HCT VFR BLD CALC: 38.3 % — SIGNIFICANT CHANGE UP (ref 34.5–45)
HGB BLD-MCNC: 12.6 G/DL — SIGNIFICANT CHANGE UP (ref 11.5–15.5)
LYMPHOCYTES # BLD AUTO: 1.3 K/UL — SIGNIFICANT CHANGE UP (ref 1–3.3)
LYMPHOCYTES # BLD AUTO: 22.6 % — SIGNIFICANT CHANGE UP (ref 13–44)
MCHC RBC-ENTMCNC: 32.3 PG — SIGNIFICANT CHANGE UP (ref 27–34)
MCHC RBC-ENTMCNC: 32.8 G/DL — SIGNIFICANT CHANGE UP (ref 32–36)
MCV RBC AUTO: 98.3 FL — SIGNIFICANT CHANGE UP (ref 80–100)
MONOCYTES # BLD AUTO: 0.3 K/UL — SIGNIFICANT CHANGE UP (ref 0–0.9)
MONOCYTES NFR BLD AUTO: 5.3 % — SIGNIFICANT CHANGE UP (ref 2–14)
NEUTROPHILS # BLD AUTO: 4.1 K/UL — SIGNIFICANT CHANGE UP (ref 1.8–7.4)
NEUTROPHILS NFR BLD AUTO: 70.6 % — SIGNIFICANT CHANGE UP (ref 43–77)
PLATELET # BLD AUTO: 235 K/UL — SIGNIFICANT CHANGE UP (ref 150–400)
RBC # BLD: 3.89 M/UL — SIGNIFICANT CHANGE UP (ref 3.8–5.2)
RBC # FLD: 12.9 % — SIGNIFICANT CHANGE UP (ref 10.3–14.5)
WBC # BLD: 5.8 K/UL — SIGNIFICANT CHANGE UP (ref 3.8–10.5)
WBC # FLD AUTO: 5.8 K/UL — SIGNIFICANT CHANGE UP (ref 3.8–10.5)

## 2019-09-13 PROCEDURE — 99214 OFFICE O/P EST MOD 30 MIN: CPT

## 2019-09-13 RX ORDER — OMEPRAZOLE 40 MG/1
40 CAPSULE, DELAYED RELEASE ORAL
Qty: 90 | Refills: 3 | Status: DISCONTINUED | COMMUNITY
Start: 1900-01-01 | End: 2019-09-13

## 2019-09-13 RX ORDER — GABAPENTIN 400 MG/1
400 CAPSULE ORAL
Refills: 0 | Status: DISCONTINUED | COMMUNITY
End: 2019-09-13

## 2019-09-13 RX ORDER — OMEPRAZOLE 20 MG/1
20 CAPSULE, DELAYED RELEASE ORAL DAILY
Qty: 30 | Refills: 5 | Status: DISCONTINUED | COMMUNITY
Start: 2019-06-03 | End: 2019-09-13

## 2019-09-14 NOTE — HISTORY OF PRESENT ILLNESS
[Disease: _____________________] : Disease: [unfilled] [de-identified] : She was initially diagnosed with breast cancer at age 52 with multifocal right breast cancer.  She had bilateral mastectomies and sentinel lymph node biopsy.  She had in the right breast a 1.5 cm infiltrating lobular carcinoma with negative sentinel lymph nodes.  The ER was 99%, FL was 99%, [id number] was negative.  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  She received for Stage I breast cancer: AC followed by T dose dense from 6/2007 to 10/2007.  She was then placed on tamoxifen from 2007 and switched to anastrozole to complete 5 years of treatment on 12/1/2012.  On follow up in 2014, she had new palpable right breast finding and biopsy confirmed breast cancer.  On 12/3/14, she had right breast mass excision with axillary lymph node dissection.  The pathology showed invasive lobular carcinoma involving the dermis and superficial subcutaneous tissue along with 1 out of 15 lymph nodes involved by carcinoma.  The ER was 95%, FL was 95%, [id number] was CISH negative. She was started on letrozole. She had worsening back pain from spinal stenosis despite physical therapy. She had L4-5 posterior interbody fusion on 3/2017. She developed postoperative R calf DVT and was started initially on warfarin then Xarelto which was stopped after anticoagulation course and physical therapy to improve mobility.  [de-identified] : dd ACT 6/2007 to 10/2007\par tamoxifen then anastrozole 2007 to 12/2012\par Letrozole 1/2015 to present  [de-identified] : invasive lobular ER 95%, CA 95%, Pls7zkm CISH negative [de-identified] : She continues to tolerate letrozole without any new joint pain. Lower back pain chronic: had MRi of the spine done. Recently had syncopal episode: was feeling well and was at her door and fainted. She came to and then unlocked her door and fainted again inside her home. She went to [facility] in August where they did some tests: unaware which ones and had loop recorder. She will follow up with neurology and cardiology at [place] and will follow up Dr Ortiz next week. No new breast changes.

## 2019-09-14 NOTE — REVIEW OF SYSTEMS
[Confused] : no confusion [Dizziness] : no dizziness [Difficulty Walking] : no difficulty walking [Fainting] : no fainting [Negative] : Heme/Lymph [de-identified] : syncopal episodes

## 2019-09-14 NOTE — PHYSICAL EXAM
[Restricted in physically strenuous activity but ambulatory and able to carry out work of a light or sedentary nature] : Status 1- Restricted in physically strenuous activity but ambulatory and able to carry out work of a light or sedentary nature, e.g., light house work, office work [Normal] : normal appearance, no rash, nodules, vesicles, ulcers, erythema [de-identified] : bilateral reconstruction with post surgical scar R axilla  [de-identified] : facial droop chronic  [de-identified] : able to get up and switch over the crutches  [de-identified] : ambulating with B crutches with L leg brace

## 2019-09-14 NOTE — ASSESSMENT
[FreeTextEntry1] : She is a 63 y/o F with recurrent lobular right breast cancer that recurred 3 years off endocrine therapy. She has been on letrozole since 1/2015. She continues to tolerate letrozole and up to date with MRI of the breast along with bone density. She has no new signs or symptoms of breast cancer recurrence. We will obtain work up from [facility] for syncope epidsodes. We encouraged her to follow up with cardiology and neurology. Next follow up in 6 months. She will continue with port flush.

## 2019-09-16 DIAGNOSIS — R11.2 NAUSEA WITH VOMITING, UNSPECIFIED: ICD-10-CM

## 2019-09-16 DIAGNOSIS — Z51.11 ENCOUNTER FOR ANTINEOPLASTIC CHEMOTHERAPY: ICD-10-CM

## 2019-09-26 ENCOUNTER — MESSAGE (OUTPATIENT)
Age: 64
End: 2019-09-26

## 2019-09-26 ENCOUNTER — APPOINTMENT (OUTPATIENT)
Dept: INTERNAL MEDICINE | Facility: CLINIC | Age: 64
End: 2019-09-26
Payer: MEDICARE

## 2019-09-26 VITALS
HEIGHT: 65 IN | DIASTOLIC BLOOD PRESSURE: 80 MMHG | HEART RATE: 82 BPM | BODY MASS INDEX: 29.16 KG/M2 | SYSTOLIC BLOOD PRESSURE: 124 MMHG | WEIGHT: 175 LBS | OXYGEN SATURATION: 98 %

## 2019-09-26 DIAGNOSIS — M51.16 INTERVERTEBRAL DISC DISORDERS WITH RADICULOPATHY, LUMBAR REGION: ICD-10-CM

## 2019-09-26 PROCEDURE — 90662 IIV NO PRSV INCREASED AG IM: CPT

## 2019-09-26 PROCEDURE — G0439: CPT

## 2019-09-26 PROCEDURE — G0008: CPT

## 2019-09-26 NOTE — REVIEW OF SYSTEMS
[Vision Problems] : vision problems [Heartburn] : heartburn [Joint Pain] : joint pain [Negative] : Psychiatric

## 2019-09-27 LAB
CHOLEST SERPL-MCNC: 183 MG/DL
CHOLEST/HDLC SERPL: 2.5 RATIO
ESTIMATED AVERAGE GLUCOSE: 120 MG/DL
HBA1C MFR BLD HPLC: 5.8 %
HDLC SERPL-MCNC: 73 MG/DL
LDLC SERPL CALC-MCNC: 82 MG/DL
TRIGL SERPL-MCNC: 138 MG/DL
TSH SERPL-ACNC: 1.61 UIU/ML

## 2019-09-27 NOTE — PHYSICAL EXAM
[No Acute Distress] : no acute distress [Well Nourished] : well nourished [Well Developed] : well developed [Normal Sclera/Conjunctiva] : normal sclera/conjunctiva [Normal Outer Ear/Nose] : the outer ears and nose were normal in appearance [Normal Oropharynx] : the oropharynx was normal [No Lymphadenopathy] : no lymphadenopathy [No JVD] : no jugular venous distention [Supple] : supple [No Accessory Muscle Use] : no accessory muscle use [No Respiratory Distress] : no respiratory distress  [Normal Rate] : normal rate  [Clear to Auscultation] : lungs were clear to auscultation bilaterally [Regular Rhythm] : with a regular rhythm [Normal S1, S2] : normal S1 and S2 [No Edema] : there was no peripheral edema [Soft] : abdomen soft [Non Tender] : non-tender [No HSM] : no HSM [Normal Posterior Cervical Nodes] : no posterior cervical lymphadenopathy [Normal Anterior Cervical Nodes] : no anterior cervical lymphadenopathy [No CVA Tenderness] : no CVA  tenderness [No Spinal Tenderness] : no spinal tenderness [No Joint Swelling] : no joint swelling [Grossly Normal Strength/Tone] : grossly normal strength/tone [No Rash] : no rash [No Focal Deficits] : no focal deficits [de-identified] : Right ear canal impacted

## 2019-09-27 NOTE — ADDENDUM
[FreeTextEntry1] : Reviewed labs, lm for pt.\par All acceptable, encouraged dietary modification and increased exercise for prediabetes

## 2019-09-27 NOTE — HISTORY OF PRESENT ILLNESS
[de-identified] : 63 y/o  F here for AWV\par Recently admitted and discharged from [facility] for syncope.\par ECHO, CT done, negative. \par Troponin negative. Loop recorder placed- sees cardiology at [place]; to keep this for 3 years\par Onc: Follows with - meg; MRI of breast negative\par Vascular; Had arterial doppler- \par Neurology: Will be seeing Dr. Samaniego for b/l LE neuropathy\par Derm: Will be seeing Dr. Brown in October\par Gyn: Sees Dr. Serena Oreilly, has submucosal fibroid- no surgery at present\par MSK: Had MRI of the spine- Dr. Jamie Mullins\par

## 2019-09-27 NOTE — HEALTH RISK ASSESSMENT
[Good] : ~his/her~  mood as  good [No] : No [No falls in past year] : Patient reported no falls in the past year [0] : 2) Feeling down, depressed, or hopeless: Not at all (0) [Single] : single [With Family] : lives with family [Fully functional (using the telephone, shopping, preparing meals, housekeeping, doing laundry, using] : Fully functional and needs no help or supervision to perform IADLs (using the telephone, shopping, preparing meals, housekeeping, doing laundry, using transportation, managing medications and managing finances) [Fully functional (bathing, dressing, toileting, transferring, walking, feeding)] : Fully functional (bathing, dressing, toileting, transferring, walking, feeding) [Reports changes in vision] : Reports changes in vision [With Patient/Caregiver] : With Patient/Caregiver [I will adhere to the patient's wishes as expressed in the advance directive except as noted below.] : I will adhere to the patient's wishes as expressed in the advance directive except as noted below [] : No [[id number]] : 0 [Reports changes in hearing] : Reports no changes in hearing [MammogramComments] : s/p b/l mastectomy [PapSmearDate] : 12/18 [BoneDensityDate] : 04/19 [ColonoscopyDate] : 10/11 [AdvancecareDate] : 09/19 [FreeTextEntry4] : Mother

## 2019-09-27 NOTE — ASSESSMENT
[FreeTextEntry1] : 63 y/o F here for AWV\par HTN: BP at goal, not on any anti-htn meds\par HLD: on statin, lft's wnl; check flp\par Elevated alc: check alc\par Onc: f/u onc\par CVS: no etiology of sycope, has loop recorder in place\par Derm: h/o melanoma- f/u dermatology\par HCM: Flu shot today\par Neuropathy: will be seeing Dr. Samaniego\par PVD: Sees Dr. Snowden\par rto 1 yr or prn

## 2019-10-11 NOTE — PHYSICAL THERAPY INITIAL EVALUATION ADULT - IMPAIRMENTS CONTRIBUTING TO GAIT DEVIATIONS, PT EVAL
Called patient discussed results. Ultrasound lining thickness still 10 but it was 3 years ago when she's having normal periods discussed the possibility of hyperplasia due to tamoxifen exposure versus just atypical tamoxifen effect. Patient will come in for a biopsy next week or 2.   decreased strength/pain/decreased ROM/impaired balance

## 2019-11-04 ENCOUNTER — OUTPATIENT (OUTPATIENT)
Dept: OUTPATIENT SERVICES | Facility: HOSPITAL | Age: 64
LOS: 1 days | Discharge: HOME | End: 2019-11-04

## 2019-11-04 DIAGNOSIS — C50.919 MALIGNANT NEOPLASM OF UNSPECIFIED SITE OF UNSPECIFIED FEMALE BREAST: ICD-10-CM

## 2019-11-04 DIAGNOSIS — C50.911 MALIGNANT NEOPLASM OF UNSPECIFIED SITE OF RIGHT FEMALE BREAST: Chronic | ICD-10-CM

## 2019-11-04 DIAGNOSIS — C43.72 MALIGNANT MELANOMA OF LEFT LOWER LIMB, INCLUDING HIP: Chronic | ICD-10-CM

## 2019-11-04 DIAGNOSIS — C07 MALIGNANT NEOPLASM OF PAROTID GLAND: Chronic | ICD-10-CM

## 2019-11-04 DIAGNOSIS — Z98.89 OTHER SPECIFIED POSTPROCEDURAL STATES: Chronic | ICD-10-CM

## 2019-11-04 DIAGNOSIS — H50.9 UNSPECIFIED STRABISMUS: Chronic | ICD-10-CM

## 2019-11-15 ENCOUNTER — RESULT REVIEW (OUTPATIENT)
Age: 64
End: 2019-11-15

## 2019-11-15 ENCOUNTER — APPOINTMENT (OUTPATIENT)
Dept: INFUSION THERAPY | Facility: HOSPITAL | Age: 64
End: 2019-11-15

## 2019-11-15 ENCOUNTER — LABORATORY RESULT (OUTPATIENT)
Age: 64
End: 2019-11-15

## 2019-11-15 LAB
BASOPHILS # BLD AUTO: 0.1 K/UL — SIGNIFICANT CHANGE UP (ref 0–0.2)
BASOPHILS NFR BLD AUTO: 1.2 % — SIGNIFICANT CHANGE UP (ref 0–2)
EOSINOPHIL # BLD AUTO: 0.1 K/UL — SIGNIFICANT CHANGE UP (ref 0–0.5)
EOSINOPHIL NFR BLD AUTO: 1.7 % — SIGNIFICANT CHANGE UP (ref 0–6)
HCT VFR BLD CALC: 38.6 % — SIGNIFICANT CHANGE UP (ref 34.5–45)
HGB BLD-MCNC: 13.3 G/DL — SIGNIFICANT CHANGE UP (ref 11.5–15.5)
LYMPHOCYTES # BLD AUTO: 1.5 K/UL — SIGNIFICANT CHANGE UP (ref 1–3.3)
LYMPHOCYTES # BLD AUTO: 26.9 % — SIGNIFICANT CHANGE UP (ref 13–44)
MCHC RBC-ENTMCNC: 33.2 PG — SIGNIFICANT CHANGE UP (ref 27–34)
MCHC RBC-ENTMCNC: 34.4 G/DL — SIGNIFICANT CHANGE UP (ref 32–36)
MCV RBC AUTO: 96.5 FL — SIGNIFICANT CHANGE UP (ref 80–100)
MONOCYTES # BLD AUTO: 0.3 K/UL — SIGNIFICANT CHANGE UP (ref 0–0.9)
MONOCYTES NFR BLD AUTO: 5.3 % — SIGNIFICANT CHANGE UP (ref 2–14)
NEUTROPHILS # BLD AUTO: 3.6 K/UL — SIGNIFICANT CHANGE UP (ref 1.8–7.4)
NEUTROPHILS NFR BLD AUTO: 64.9 % — SIGNIFICANT CHANGE UP (ref 43–77)
PLATELET # BLD AUTO: 203 K/UL — SIGNIFICANT CHANGE UP (ref 150–400)
RBC # BLD: 4 M/UL — SIGNIFICANT CHANGE UP (ref 3.8–5.2)
RBC # FLD: 11.4 % — SIGNIFICANT CHANGE UP (ref 10.3–14.5)
WBC # BLD: 5.5 K/UL — SIGNIFICANT CHANGE UP (ref 3.8–10.5)
WBC # FLD AUTO: 5.5 K/UL — SIGNIFICANT CHANGE UP (ref 3.8–10.5)

## 2019-12-02 ENCOUNTER — APPOINTMENT (OUTPATIENT)
Dept: SURGICAL ONCOLOGY | Facility: CLINIC | Age: 64
End: 2019-12-02
Payer: MEDICARE

## 2019-12-02 VITALS
WEIGHT: 176 LBS | BODY MASS INDEX: 29.32 KG/M2 | HEART RATE: 75 BPM | SYSTOLIC BLOOD PRESSURE: 155 MMHG | HEIGHT: 65 IN | DIASTOLIC BLOOD PRESSURE: 101 MMHG | OXYGEN SATURATION: 96 %

## 2019-12-02 PROCEDURE — 99214 OFFICE O/P EST MOD 30 MIN: CPT

## 2019-12-02 NOTE — PHYSICAL EXAM
[Normal] : supple, no neck mass and thyroid not enlarged [Normal Supraclavicular Lymph Nodes] : normal supraclavicular lymph nodes [Normal Neck Lymph Nodes] : normal neck lymph nodes  [Normal Axillary Lymph Nodes] : normal axillary lymph nodes [Normal] : full range of motion and no deformities appreciated [Normal Groin Lymph Nodes] : normal groin lymph nodes [de-identified] : Below

## 2019-12-02 NOTE — REVIEW OF SYSTEMS
[Negative] : Endocrine [FreeTextEntry5] : DVT [FreeTextEntry8] : GERD [de-identified] : Melanoma [de-identified] : Neuropathy [FreeTextEntry1] : Breast cancer

## 2019-12-02 NOTE — REASON FOR VISIT
[Follow-Up Visit] : a follow-up visit for [Other: _____] : [unfilled] [FreeTextEntry2] : Left fooot melanoma

## 2019-12-02 NOTE — ASSESSMENT
[FreeTextEntry1] : Clinically doing well, no evidence of melanoma recurrence.\par \par Presently no imaging studies warranted.\par \par I've asked to see her in another year, sooner if needed

## 2019-12-02 NOTE — HISTORY OF PRESENT ILLNESS
[de-identified] : 64 year-old lady who in July 2008 had wide excision of a melanoma in situ of the LEFT FOOT, on the plantar aspect, with negative margins, and reconstruction by Dr. Paul Stokes.\par \par No relatives with melanoma.\par \par She had spinal stenosis surgery, 2017, by Dr. Jamie Mullins at [place].\par ***Her postoperative course was complicated by a DVT***\par \par Hospn in April 2016 @ [facility] with severe viral infection\par \par She has had a right parotidectomy for PAROTID CANCER in 2004 by Dr. Jamie Taveras, followed by radiation therapy.\par She follows up with Dr. James Tompkins for a left parapharyngeal mass\par \par In 1977 she had Lumpectomy for a MALIGNANT PHYLLIODES tumor of the RIGHT breast.\par A left breast biopsy in 2000 was benign.\par In April 2007 she had a right mastectomy for BREAST CANCER, and a prophylactic left mastectomy, by Dr. Susan Palleschi, and reconstruction by Dr. Roy.\par In December 2014 she had surgery for recurrent right breast cancer, followed by radiation therapy by Dr.Beatrice Pope.\par Her hematologist was Dr. Metz, Dr. Tito HOPPER at the [facility]\par Chemotherapy in 2007.\par Tamoxifen and anastrozole, [phone].\par Letrozole from January 2015 toe presently, September 2019 visit she was doing well.\par \par Her dermatologist is Dr. Caron RUIZ, November 2019 was unremarkable.\par \par Eye examination, Feb 2018 was ok\par \par Her gynecologist is Dr. Rosalba GUTIERREZ\par December 2018 Pap smear was unremarkable.\par Was referred to Dr. Serena Oreilly for consideration of hysterectomy for fibroids, Evaluation and progress\par \par She has had bilateral mastectomies.\par \par Colonoscopy will be repeated in October 2021 by Dr. Sarah Kuo\par She also has a history of GERD, Symptoms are controlled with omeprazole and Zantac\par \par Her internist is Dr. Terrie JEROME.\par \par She does not have a pacemaker or defibrillator.\par +LOOP-RECORDER, , After a syncopal episode, Summer 2019, \par Cardiology is at [facility]\par \par She takes baby aspirin daily.\par \par Medications:\par Omeprazole and Zantac for GERD.\par Letrozole as adjuvant treatment for her recurrent breast cancer.\par For chronic pain management she is on gabapentin for neuropathy; Neurology: Dr. Rajwinder Jones\par \par She has seen Dr. Luis Snowden from vascular surgery for arterial lower extremity insufficiency.\par She's been treated with exercise, and Pletal

## 2019-12-05 ENCOUNTER — APPOINTMENT (OUTPATIENT)
Dept: MRI IMAGING | Facility: IMAGING CENTER | Age: 64
End: 2019-12-05
Payer: MEDICARE

## 2019-12-05 ENCOUNTER — OUTPATIENT (OUTPATIENT)
Dept: OUTPATIENT SERVICES | Facility: HOSPITAL | Age: 64
LOS: 1 days | End: 2019-12-05
Payer: MEDICARE

## 2019-12-05 DIAGNOSIS — Z98.89 OTHER SPECIFIED POSTPROCEDURAL STATES: Chronic | ICD-10-CM

## 2019-12-05 DIAGNOSIS — C50.911 MALIGNANT NEOPLASM OF UNSPECIFIED SITE OF RIGHT FEMALE BREAST: Chronic | ICD-10-CM

## 2019-12-05 DIAGNOSIS — C07 MALIGNANT NEOPLASM OF PAROTID GLAND: Chronic | ICD-10-CM

## 2019-12-05 DIAGNOSIS — D49.3 NEOPLASM OF UNSPECIFIED BEHAVIOR OF BREAST: ICD-10-CM

## 2019-12-05 DIAGNOSIS — C43.72 MALIGNANT MELANOMA OF LEFT LOWER LIMB, INCLUDING HIP: Chronic | ICD-10-CM

## 2019-12-05 DIAGNOSIS — H50.9 UNSPECIFIED STRABISMUS: Chronic | ICD-10-CM

## 2019-12-05 PROCEDURE — 70553 MRI BRAIN STEM W/O & W/DYE: CPT

## 2019-12-05 PROCEDURE — 70553 MRI BRAIN STEM W/O & W/DYE: CPT | Mod: 26

## 2019-12-05 PROCEDURE — A9585: CPT

## 2019-12-16 ENCOUNTER — RESULT REVIEW (OUTPATIENT)
Age: 64
End: 2019-12-16

## 2019-12-16 ENCOUNTER — OUTPATIENT (OUTPATIENT)
Dept: OUTPATIENT SERVICES | Facility: HOSPITAL | Age: 64
LOS: 1 days | End: 2019-12-16
Payer: MEDICARE

## 2019-12-16 ENCOUNTER — APPOINTMENT (OUTPATIENT)
Dept: RADIOLOGY | Facility: HOSPITAL | Age: 64
End: 2019-12-16
Payer: MEDICARE

## 2019-12-16 DIAGNOSIS — G61.81 CHRONIC INFLAMMATORY DEMYELINATING POLYNEURITIS: ICD-10-CM

## 2019-12-16 DIAGNOSIS — C50.911 MALIGNANT NEOPLASM OF UNSPECIFIED SITE OF RIGHT FEMALE BREAST: Chronic | ICD-10-CM

## 2019-12-16 DIAGNOSIS — H50.9 UNSPECIFIED STRABISMUS: Chronic | ICD-10-CM

## 2019-12-16 DIAGNOSIS — C07 MALIGNANT NEOPLASM OF PAROTID GLAND: Chronic | ICD-10-CM

## 2019-12-16 DIAGNOSIS — Z98.89 OTHER SPECIFIED POSTPROCEDURAL STATES: Chronic | ICD-10-CM

## 2019-12-16 DIAGNOSIS — C43.72 MALIGNANT MELANOMA OF LEFT LOWER LIMB, INCLUDING HIP: Chronic | ICD-10-CM

## 2019-12-16 LAB
APPEARANCE CSF: CLEAR — SIGNIFICANT CHANGE UP
COLOR CSF: SIGNIFICANT CHANGE UP
GLUCOSE CSF-MCNC: 62 MG/DL — SIGNIFICANT CHANGE UP (ref 40–70)
GRAM STN FLD: SIGNIFICANT CHANGE UP
LYMPHOCYTES # CSF: 69 % — SIGNIFICANT CHANGE UP (ref 40–80)
MONOS+MACROS NFR CSF: 23 % — SIGNIFICANT CHANGE UP (ref 15–45)
NEUTROPHILS # CSF: 8 % — HIGH (ref 0–6)
NRBC NFR CSF: <1 — SIGNIFICANT CHANGE UP (ref 0–5)
PROT CSF-MCNC: 31 MG/DL — SIGNIFICANT CHANGE UP (ref 15–45)
RBC # CSF: 2 /UL — HIGH (ref 0–0)
SPECIMEN SOURCE: SIGNIFICANT CHANGE UP
TUBE TYPE: SIGNIFICANT CHANGE UP

## 2019-12-16 PROCEDURE — 88108 CYTOPATH CONCENTRATE TECH: CPT | Mod: 26

## 2019-12-16 PROCEDURE — 88108 CYTOPATH CONCENTRATE TECH: CPT

## 2019-12-16 PROCEDURE — 87070 CULTURE OTHR SPECIMN AEROBIC: CPT

## 2019-12-16 PROCEDURE — 87205 SMEAR GRAM STAIN: CPT

## 2019-12-16 PROCEDURE — 87529 HSV DNA AMP PROBE: CPT

## 2019-12-16 PROCEDURE — 77003 FLUOROGUIDE FOR SPINE INJECT: CPT

## 2019-12-16 PROCEDURE — 82945 GLUCOSE OTHER FLUID: CPT

## 2019-12-16 PROCEDURE — 84157 ASSAY OF PROTEIN OTHER: CPT

## 2019-12-16 PROCEDURE — 89051 BODY FLUID CELL COUNT: CPT

## 2019-12-16 PROCEDURE — 62270 DX LMBR SPI PNXR: CPT

## 2019-12-16 PROCEDURE — 77003 FLUOROGUIDE FOR SPINE INJECT: CPT | Mod: 26

## 2019-12-16 PROCEDURE — 83873 ASSAY OF CSF PROTEIN: CPT

## 2019-12-19 LAB
CULTURE RESULTS: NO GROWTH — SIGNIFICANT CHANGE UP
SPECIMEN SOURCE: SIGNIFICANT CHANGE UP

## 2019-12-21 LAB — MBP CSF-MCNC: <2 MCG/L — LOW (ref 2–4)

## 2020-01-08 ENCOUNTER — OUTPATIENT (OUTPATIENT)
Dept: OUTPATIENT SERVICES | Facility: HOSPITAL | Age: 65
LOS: 1 days | Discharge: HOME | End: 2020-01-08

## 2020-01-08 DIAGNOSIS — H50.9 UNSPECIFIED STRABISMUS: Chronic | ICD-10-CM

## 2020-01-08 DIAGNOSIS — C50.911 MALIGNANT NEOPLASM OF UNSPECIFIED SITE OF RIGHT FEMALE BREAST: Chronic | ICD-10-CM

## 2020-01-08 DIAGNOSIS — C07 MALIGNANT NEOPLASM OF PAROTID GLAND: Chronic | ICD-10-CM

## 2020-01-08 DIAGNOSIS — Z98.89 OTHER SPECIFIED POSTPROCEDURAL STATES: Chronic | ICD-10-CM

## 2020-01-08 DIAGNOSIS — C50.919 MALIGNANT NEOPLASM OF UNSPECIFIED SITE OF UNSPECIFIED FEMALE BREAST: ICD-10-CM

## 2020-01-08 DIAGNOSIS — C43.72 MALIGNANT MELANOMA OF LEFT LOWER LIMB, INCLUDING HIP: Chronic | ICD-10-CM

## 2020-01-10 ENCOUNTER — APPOINTMENT (OUTPATIENT)
Dept: INFUSION THERAPY | Facility: HOSPITAL | Age: 65
End: 2020-01-10

## 2020-03-11 ENCOUNTER — OUTPATIENT (OUTPATIENT)
Dept: OUTPATIENT SERVICES | Facility: HOSPITAL | Age: 65
LOS: 1 days | Discharge: HOME | End: 2020-03-11

## 2020-03-11 DIAGNOSIS — C50.911 MALIGNANT NEOPLASM OF UNSPECIFIED SITE OF RIGHT FEMALE BREAST: Chronic | ICD-10-CM

## 2020-03-11 DIAGNOSIS — H50.9 UNSPECIFIED STRABISMUS: Chronic | ICD-10-CM

## 2020-03-11 DIAGNOSIS — C07 MALIGNANT NEOPLASM OF PAROTID GLAND: Chronic | ICD-10-CM

## 2020-03-11 DIAGNOSIS — C50.919 MALIGNANT NEOPLASM OF UNSPECIFIED SITE OF UNSPECIFIED FEMALE BREAST: ICD-10-CM

## 2020-03-11 DIAGNOSIS — C43.72 MALIGNANT MELANOMA OF LEFT LOWER LIMB, INCLUDING HIP: Chronic | ICD-10-CM

## 2020-03-11 DIAGNOSIS — Z98.89 OTHER SPECIFIED POSTPROCEDURAL STATES: Chronic | ICD-10-CM

## 2020-03-13 ENCOUNTER — APPOINTMENT (OUTPATIENT)
Dept: INFUSION THERAPY | Facility: HOSPITAL | Age: 65
End: 2020-03-13

## 2020-03-13 ENCOUNTER — LABORATORY RESULT (OUTPATIENT)
Age: 65
End: 2020-03-13

## 2020-03-13 ENCOUNTER — APPOINTMENT (OUTPATIENT)
Dept: HEMATOLOGY ONCOLOGY | Facility: CLINIC | Age: 65
End: 2020-03-13
Payer: MEDICARE

## 2020-03-13 ENCOUNTER — RESULT REVIEW (OUTPATIENT)
Age: 65
End: 2020-03-13

## 2020-03-13 VITALS
DIASTOLIC BLOOD PRESSURE: 90 MMHG | OXYGEN SATURATION: 98 % | HEART RATE: 68 BPM | BODY MASS INDEX: 28.62 KG/M2 | RESPIRATION RATE: 16 BRPM | TEMPERATURE: 97.8 F | WEIGHT: 171.96 LBS | SYSTOLIC BLOOD PRESSURE: 149 MMHG

## 2020-03-13 LAB
BASOPHILS # BLD AUTO: 0.04 K/UL — SIGNIFICANT CHANGE UP (ref 0–0.2)
BASOPHILS NFR BLD AUTO: 0.9 % — SIGNIFICANT CHANGE UP (ref 0–2)
EOSINOPHIL # BLD AUTO: 0.11 K/UL — SIGNIFICANT CHANGE UP (ref 0–0.5)
EOSINOPHIL NFR BLD AUTO: 2.5 % — SIGNIFICANT CHANGE UP (ref 0–6)
HCT VFR BLD CALC: 35.5 % — SIGNIFICANT CHANGE UP (ref 34.5–45)
HGB BLD-MCNC: 11.8 G/DL — SIGNIFICANT CHANGE UP (ref 11.5–15.5)
IMM GRANULOCYTES NFR BLD AUTO: 0.5 % — SIGNIFICANT CHANGE UP (ref 0–1.5)
LYMPHOCYTES # BLD AUTO: 1.17 K/UL — SIGNIFICANT CHANGE UP (ref 1–3.3)
LYMPHOCYTES # BLD AUTO: 27 % — SIGNIFICANT CHANGE UP (ref 13–44)
MCHC RBC-ENTMCNC: 31.7 PG — SIGNIFICANT CHANGE UP (ref 27–34)
MCHC RBC-ENTMCNC: 33.2 GM/DL — SIGNIFICANT CHANGE UP (ref 32–36)
MCV RBC AUTO: 95.4 FL — SIGNIFICANT CHANGE UP (ref 80–100)
MONOCYTES # BLD AUTO: 0.32 K/UL — SIGNIFICANT CHANGE UP (ref 0–0.9)
MONOCYTES NFR BLD AUTO: 7.4 % — SIGNIFICANT CHANGE UP (ref 2–14)
NEUTROPHILS # BLD AUTO: 2.68 K/UL — SIGNIFICANT CHANGE UP (ref 1.8–7.4)
NEUTROPHILS NFR BLD AUTO: 61.7 % — SIGNIFICANT CHANGE UP (ref 43–77)
NRBC # BLD: 0 /100 WBCS — SIGNIFICANT CHANGE UP (ref 0–0)
PLATELET # BLD AUTO: 197 K/UL — SIGNIFICANT CHANGE UP (ref 150–400)
RBC # BLD: 3.72 M/UL — LOW (ref 3.8–5.2)
RBC # FLD: 12.7 % — SIGNIFICANT CHANGE UP (ref 10.3–14.5)
WBC # BLD: 4.34 K/UL — SIGNIFICANT CHANGE UP (ref 3.8–10.5)
WBC # FLD AUTO: 4.34 K/UL — SIGNIFICANT CHANGE UP (ref 3.8–10.5)

## 2020-03-13 PROCEDURE — 99213 OFFICE O/P EST LOW 20 MIN: CPT

## 2020-03-14 NOTE — HISTORY OF PRESENT ILLNESS
[Disease: _____________________] : Disease: [unfilled] [de-identified] : She was initially diagnosed with breast cancer at age 52 with multifocal right breast cancer.  She had bilateral mastectomies and sentinel lymph node biopsy.  She had in the right breast a 1.5 cm infiltrating lobular carcinoma with negative sentinel lymph nodes.  The ER was 99%, KY was 99%, Crh0shj was negative.  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  She received for Stage I breast cancer: AC followed by T dose dense from 6/2007 to 10/2007.  She was then placed on tamoxifen from 2007 and switched to anastrozole to complete 5 years of treatment on 12/1/2012.  On follow up in 2014, she had new palpable right breast finding and biopsy confirmed breast cancer.  On 12/3/14, she had right breast mass excision with axillary lymph node dissection.  The pathology showed invasive lobular carcinoma involving the dermis and superficial subcutaneous tissue along with 1 out of 15 lymph nodes involved by carcinoma.  The ER was 95%, KY was 95%, [id number] was CISH negative. She was started on letrozole. She had worsening back pain from spinal stenosis despite physical therapy. She had L4-5 posterior interbody fusion on 3/2017. She developed postoperative R calf DVT and was started initially on warfarin then Xarelto which was stopped after anticoagulation course and physical therapy to improve mobility.  [de-identified] : invasive lobular ER 95%, WV 95%, [id number] CISH negative [de-identified] : dd ACT 6/2007 to 10/2007\par tamoxifen then anastrozole 2007 to 12/2012\par Letrozole 1/2015 to present  [de-identified] : She is walking better with crutch. She is taking letrozole and tolerating well. Denies any new chest wall or skin changes. She denies any new medications. Last bone density done 4/2019 stable. She denies any new bone pain or cough. Continues to flush port without any port site issues. She saw ENT and no change in the MRI of the orbit or neck last year.

## 2020-03-14 NOTE — ASSESSMENT
[FreeTextEntry1] : She is a 63 y/o F with recurrent lobular right breast cancer that recurred 3 years off endocrine therapy. She has been on letrozole since 1/2015. She continues to tolerate letrozole without any new signs or symptoms of breast cancer recurrence. She wants to maintain port and will continue with port flushes. Next follow up in 6 months but earlier if any new symptoms. Blood work remains WNL on letrozole. \par

## 2020-03-14 NOTE — REVIEW OF SYSTEMS
[Confused] : no confusion [Dizziness] : no dizziness [Fainting] : no fainting [Difficulty Walking] : difficulty walking [Negative] : Allergic/Immunologic

## 2020-03-14 NOTE — PHYSICAL EXAM
[Restricted in physically strenuous activity but ambulatory and able to carry out work of a light or sedentary nature] : Status 1- Restricted in physically strenuous activity but ambulatory and able to carry out work of a light or sedentary nature, e.g., light house work, office work [Normal] : affect appropriate [de-identified] : facial droop chronic  [de-identified] : bilateral reconstruction with post surgical scar R axilla  [de-identified] : ambulating with crutch and L leg brace  [de-identified] : able to get up and switch over the crutches

## 2020-04-30 ENCOUNTER — FORM ENCOUNTER (OUTPATIENT)
Age: 65
End: 2020-04-30

## 2020-05-01 ENCOUNTER — APPOINTMENT (OUTPATIENT)
Dept: OBGYN | Facility: CLINIC | Age: 65
End: 2020-05-01
Payer: MEDICARE

## 2020-05-01 ENCOUNTER — RESULT REVIEW (OUTPATIENT)
Age: 65
End: 2020-05-01

## 2020-05-01 PROCEDURE — 58100 BIOPSY OF UTERUS LINING: CPT

## 2020-05-04 ENCOUNTER — FORM ENCOUNTER (OUTPATIENT)
Age: 65
End: 2020-05-04

## 2020-05-12 ENCOUNTER — OUTPATIENT (OUTPATIENT)
Dept: OUTPATIENT SERVICES | Facility: HOSPITAL | Age: 65
LOS: 1 days | Discharge: HOME | End: 2020-05-12

## 2020-05-12 DIAGNOSIS — H50.9 UNSPECIFIED STRABISMUS: Chronic | ICD-10-CM

## 2020-05-12 DIAGNOSIS — Z98.89 OTHER SPECIFIED POSTPROCEDURAL STATES: Chronic | ICD-10-CM

## 2020-05-12 DIAGNOSIS — C07 MALIGNANT NEOPLASM OF PAROTID GLAND: Chronic | ICD-10-CM

## 2020-05-12 DIAGNOSIS — C50.911 MALIGNANT NEOPLASM OF UNSPECIFIED SITE OF RIGHT FEMALE BREAST: Chronic | ICD-10-CM

## 2020-05-12 DIAGNOSIS — C43.72 MALIGNANT MELANOMA OF LEFT LOWER LIMB, INCLUDING HIP: Chronic | ICD-10-CM

## 2020-05-12 DIAGNOSIS — C50.919 MALIGNANT NEOPLASM OF UNSPECIFIED SITE OF UNSPECIFIED FEMALE BREAST: ICD-10-CM

## 2020-05-15 ENCOUNTER — RESULT REVIEW (OUTPATIENT)
Age: 65
End: 2020-05-15

## 2020-05-15 ENCOUNTER — LABORATORY RESULT (OUTPATIENT)
Age: 65
End: 2020-05-15

## 2020-05-15 ENCOUNTER — APPOINTMENT (OUTPATIENT)
Dept: INFUSION THERAPY | Facility: HOSPITAL | Age: 65
End: 2020-05-15

## 2020-05-15 LAB
BASOPHILS # BLD AUTO: 0.04 K/UL — SIGNIFICANT CHANGE UP (ref 0–0.2)
BASOPHILS NFR BLD AUTO: 0.8 % — SIGNIFICANT CHANGE UP (ref 0–2)
EOSINOPHIL # BLD AUTO: 0.09 K/UL — SIGNIFICANT CHANGE UP (ref 0–0.5)
EOSINOPHIL NFR BLD AUTO: 1.9 % — SIGNIFICANT CHANGE UP (ref 0–6)
HCT VFR BLD CALC: 35.5 % — SIGNIFICANT CHANGE UP (ref 34.5–45)
HGB BLD-MCNC: 11.8 G/DL — SIGNIFICANT CHANGE UP (ref 11.5–15.5)
IMM GRANULOCYTES NFR BLD AUTO: 0.4 % — SIGNIFICANT CHANGE UP (ref 0–1.5)
LYMPHOCYTES # BLD AUTO: 1.23 K/UL — SIGNIFICANT CHANGE UP (ref 1–3.3)
LYMPHOCYTES # BLD AUTO: 25.7 % — SIGNIFICANT CHANGE UP (ref 13–44)
MCHC RBC-ENTMCNC: 31.9 PG — SIGNIFICANT CHANGE UP (ref 27–34)
MCHC RBC-ENTMCNC: 33.2 GM/DL — SIGNIFICANT CHANGE UP (ref 32–36)
MCV RBC AUTO: 95.9 FL — SIGNIFICANT CHANGE UP (ref 80–100)
MONOCYTES # BLD AUTO: 0.32 K/UL — SIGNIFICANT CHANGE UP (ref 0–0.9)
MONOCYTES NFR BLD AUTO: 6.7 % — SIGNIFICANT CHANGE UP (ref 2–14)
NEUTROPHILS # BLD AUTO: 3.08 K/UL — SIGNIFICANT CHANGE UP (ref 1.8–7.4)
NEUTROPHILS NFR BLD AUTO: 64.5 % — SIGNIFICANT CHANGE UP (ref 43–77)
NRBC # BLD: 0 /100 WBCS — SIGNIFICANT CHANGE UP (ref 0–0)
PLATELET # BLD AUTO: 197 K/UL — SIGNIFICANT CHANGE UP (ref 150–400)
RBC # BLD: 3.7 M/UL — LOW (ref 3.8–5.2)
RBC # FLD: 12.6 % — SIGNIFICANT CHANGE UP (ref 10.3–14.5)
WBC # BLD: 4.78 K/UL — SIGNIFICANT CHANGE UP (ref 3.8–10.5)
WBC # FLD AUTO: 4.78 K/UL — SIGNIFICANT CHANGE UP (ref 3.8–10.5)

## 2020-06-08 ENCOUNTER — APPOINTMENT (OUTPATIENT)
Dept: GASTROENTEROLOGY | Facility: CLINIC | Age: 65
End: 2020-06-08

## 2020-06-12 ENCOUNTER — OUTPATIENT (OUTPATIENT)
Dept: OUTPATIENT SERVICES | Facility: HOSPITAL | Age: 65
LOS: 1 days | End: 2020-06-12
Payer: MEDICARE

## 2020-06-12 ENCOUNTER — APPOINTMENT (OUTPATIENT)
Dept: ULTRASOUND IMAGING | Facility: IMAGING CENTER | Age: 65
End: 2020-06-12
Payer: MEDICARE

## 2020-06-12 DIAGNOSIS — C07 MALIGNANT NEOPLASM OF PAROTID GLAND: Chronic | ICD-10-CM

## 2020-06-12 DIAGNOSIS — C50.911 MALIGNANT NEOPLASM OF UNSPECIFIED SITE OF RIGHT FEMALE BREAST: Chronic | ICD-10-CM

## 2020-06-12 DIAGNOSIS — Z85.3 PERSONAL HISTORY OF MALIGNANT NEOPLASM OF BREAST: ICD-10-CM

## 2020-06-12 DIAGNOSIS — D17.1 BENIGN LIPOMATOUS NEOPLASM OF SKIN AND SUBCUTANEOUS TISSUE OF TRUNK: ICD-10-CM

## 2020-06-12 DIAGNOSIS — Z98.89 OTHER SPECIFIED POSTPROCEDURAL STATES: Chronic | ICD-10-CM

## 2020-06-12 DIAGNOSIS — C43.72 MALIGNANT MELANOMA OF LEFT LOWER LIMB, INCLUDING HIP: Chronic | ICD-10-CM

## 2020-06-12 DIAGNOSIS — H50.9 UNSPECIFIED STRABISMUS: Chronic | ICD-10-CM

## 2020-06-12 PROCEDURE — 76882 US LMTD JT/FCL EVL NVASC XTR: CPT

## 2020-06-12 PROCEDURE — 76882 US LMTD JT/FCL EVL NVASC XTR: CPT | Mod: 26,LT

## 2020-06-15 ENCOUNTER — APPOINTMENT (OUTPATIENT)
Dept: GASTROENTEROLOGY | Facility: CLINIC | Age: 65
End: 2020-06-15

## 2020-07-06 ENCOUNTER — OUTPATIENT (OUTPATIENT)
Dept: OUTPATIENT SERVICES | Facility: HOSPITAL | Age: 65
LOS: 1 days | Discharge: HOME | End: 2020-07-06

## 2020-07-06 DIAGNOSIS — C43.72 MALIGNANT MELANOMA OF LEFT LOWER LIMB, INCLUDING HIP: Chronic | ICD-10-CM

## 2020-07-06 DIAGNOSIS — C50.919 MALIGNANT NEOPLASM OF UNSPECIFIED SITE OF UNSPECIFIED FEMALE BREAST: ICD-10-CM

## 2020-07-06 DIAGNOSIS — Z98.89 OTHER SPECIFIED POSTPROCEDURAL STATES: Chronic | ICD-10-CM

## 2020-07-06 DIAGNOSIS — C50.911 MALIGNANT NEOPLASM OF UNSPECIFIED SITE OF RIGHT FEMALE BREAST: Chronic | ICD-10-CM

## 2020-07-06 DIAGNOSIS — H50.9 UNSPECIFIED STRABISMUS: Chronic | ICD-10-CM

## 2020-07-06 DIAGNOSIS — C07 MALIGNANT NEOPLASM OF PAROTID GLAND: Chronic | ICD-10-CM

## 2020-07-07 ENCOUNTER — APPOINTMENT (OUTPATIENT)
Dept: VASCULAR SURGERY | Facility: CLINIC | Age: 65
End: 2020-07-07

## 2020-07-08 ENCOUNTER — RESULT REVIEW (OUTPATIENT)
Age: 65
End: 2020-07-08

## 2020-07-08 ENCOUNTER — APPOINTMENT (OUTPATIENT)
Dept: INFUSION THERAPY | Facility: HOSPITAL | Age: 65
End: 2020-07-08

## 2020-07-08 ENCOUNTER — LABORATORY RESULT (OUTPATIENT)
Age: 65
End: 2020-07-08

## 2020-07-08 LAB
BASOPHILS # BLD AUTO: 0.05 K/UL — SIGNIFICANT CHANGE UP (ref 0–0.2)
BASOPHILS NFR BLD AUTO: 1 % — SIGNIFICANT CHANGE UP (ref 0–2)
EOSINOPHIL # BLD AUTO: 0.05 K/UL — SIGNIFICANT CHANGE UP (ref 0–0.5)
EOSINOPHIL NFR BLD AUTO: 1 % — SIGNIFICANT CHANGE UP (ref 0–6)
HCT VFR BLD CALC: 36.3 % — SIGNIFICANT CHANGE UP (ref 34.5–45)
HGB BLD-MCNC: 11.5 G/DL — SIGNIFICANT CHANGE UP (ref 11.5–15.5)
IMM GRANULOCYTES NFR BLD AUTO: 0.4 % — SIGNIFICANT CHANGE UP (ref 0–1.5)
LYMPHOCYTES # BLD AUTO: 1.36 K/UL — SIGNIFICANT CHANGE UP (ref 1–3.3)
LYMPHOCYTES # BLD AUTO: 27.5 % — SIGNIFICANT CHANGE UP (ref 13–44)
MCHC RBC-ENTMCNC: 31.4 PG — SIGNIFICANT CHANGE UP (ref 27–34)
MCHC RBC-ENTMCNC: 31.7 GM/DL — LOW (ref 32–36)
MCV RBC AUTO: 99.2 FL — SIGNIFICANT CHANGE UP (ref 80–100)
MONOCYTES # BLD AUTO: 0.27 K/UL — SIGNIFICANT CHANGE UP (ref 0–0.9)
MONOCYTES NFR BLD AUTO: 5.5 % — SIGNIFICANT CHANGE UP (ref 2–14)
NEUTROPHILS # BLD AUTO: 3.2 K/UL — SIGNIFICANT CHANGE UP (ref 1.8–7.4)
NEUTROPHILS NFR BLD AUTO: 64.6 % — SIGNIFICANT CHANGE UP (ref 43–77)
NRBC # BLD: 0 /100 WBCS — SIGNIFICANT CHANGE UP (ref 0–0)
PLATELET # BLD AUTO: 178 K/UL — SIGNIFICANT CHANGE UP (ref 150–400)
RBC # BLD: 3.66 M/UL — LOW (ref 3.8–5.2)
RBC # FLD: 12.6 % — SIGNIFICANT CHANGE UP (ref 10.3–14.5)
WBC # BLD: 4.95 K/UL — SIGNIFICANT CHANGE UP (ref 3.8–10.5)
WBC # FLD AUTO: 4.95 K/UL — SIGNIFICANT CHANGE UP (ref 3.8–10.5)

## 2020-09-14 ENCOUNTER — OUTPATIENT (OUTPATIENT)
Dept: OUTPATIENT SERVICES | Facility: HOSPITAL | Age: 65
LOS: 1 days | Discharge: HOME | End: 2020-09-14

## 2020-09-14 DIAGNOSIS — C07 MALIGNANT NEOPLASM OF PAROTID GLAND: Chronic | ICD-10-CM

## 2020-09-14 DIAGNOSIS — H50.9 UNSPECIFIED STRABISMUS: Chronic | ICD-10-CM

## 2020-09-14 DIAGNOSIS — C50.911 MALIGNANT NEOPLASM OF UNSPECIFIED SITE OF RIGHT FEMALE BREAST: Chronic | ICD-10-CM

## 2020-09-14 DIAGNOSIS — C50.919 MALIGNANT NEOPLASM OF UNSPECIFIED SITE OF UNSPECIFIED FEMALE BREAST: ICD-10-CM

## 2020-09-14 DIAGNOSIS — C43.72 MALIGNANT MELANOMA OF LEFT LOWER LIMB, INCLUDING HIP: Chronic | ICD-10-CM

## 2020-09-14 DIAGNOSIS — Z98.89 OTHER SPECIFIED POSTPROCEDURAL STATES: Chronic | ICD-10-CM

## 2020-09-18 ENCOUNTER — APPOINTMENT (OUTPATIENT)
Dept: HEMATOLOGY ONCOLOGY | Facility: CLINIC | Age: 65
End: 2020-09-18
Payer: MEDICARE

## 2020-09-18 ENCOUNTER — RESULT REVIEW (OUTPATIENT)
Age: 65
End: 2020-09-18

## 2020-09-18 ENCOUNTER — LABORATORY RESULT (OUTPATIENT)
Age: 65
End: 2020-09-18

## 2020-09-18 ENCOUNTER — APPOINTMENT (OUTPATIENT)
Dept: INFUSION THERAPY | Facility: HOSPITAL | Age: 65
End: 2020-09-18

## 2020-09-18 VITALS
OXYGEN SATURATION: 98 % | HEIGHT: 65.35 IN | DIASTOLIC BLOOD PRESSURE: 81 MMHG | HEART RATE: 73 BPM | BODY MASS INDEX: 27.76 KG/M2 | RESPIRATION RATE: 16 BRPM | TEMPERATURE: 98.6 F | SYSTOLIC BLOOD PRESSURE: 128 MMHG | WEIGHT: 168.65 LBS

## 2020-09-18 LAB
BASOPHILS # BLD AUTO: 0.04 K/UL — SIGNIFICANT CHANGE UP (ref 0–0.2)
BASOPHILS NFR BLD AUTO: 0.9 % — SIGNIFICANT CHANGE UP (ref 0–2)
EOSINOPHIL # BLD AUTO: 0.1 K/UL — SIGNIFICANT CHANGE UP (ref 0–0.5)
EOSINOPHIL NFR BLD AUTO: 2.2 % — SIGNIFICANT CHANGE UP (ref 0–6)
HCT VFR BLD CALC: 37.8 % — SIGNIFICANT CHANGE UP (ref 34.5–45)
HGB BLD-MCNC: 11.8 G/DL — SIGNIFICANT CHANGE UP (ref 11.5–15.5)
IMM GRANULOCYTES NFR BLD AUTO: 0.4 % — SIGNIFICANT CHANGE UP (ref 0–1.5)
LYMPHOCYTES # BLD AUTO: 1.08 K/UL — SIGNIFICANT CHANGE UP (ref 1–3.3)
LYMPHOCYTES # BLD AUTO: 24 % — SIGNIFICANT CHANGE UP (ref 13–44)
MCHC RBC-ENTMCNC: 31.2 G/DL — LOW (ref 32–36)
MCHC RBC-ENTMCNC: 31.2 PG — SIGNIFICANT CHANGE UP (ref 27–34)
MCV RBC AUTO: 100 FL — SIGNIFICANT CHANGE UP (ref 80–100)
MONOCYTES # BLD AUTO: 0.38 K/UL — SIGNIFICANT CHANGE UP (ref 0–0.9)
MONOCYTES NFR BLD AUTO: 8.4 % — SIGNIFICANT CHANGE UP (ref 2–14)
NEUTROPHILS # BLD AUTO: 2.88 K/UL — SIGNIFICANT CHANGE UP (ref 1.8–7.4)
NEUTROPHILS NFR BLD AUTO: 64.1 % — SIGNIFICANT CHANGE UP (ref 43–77)
NRBC # BLD: 0 /100 WBCS — SIGNIFICANT CHANGE UP (ref 0–0)
PLATELET # BLD AUTO: 209 K/UL — SIGNIFICANT CHANGE UP (ref 150–400)
RBC # BLD: 3.78 M/UL — LOW (ref 3.8–5.2)
RBC # FLD: 13.2 % — SIGNIFICANT CHANGE UP (ref 10.3–14.5)
WBC # BLD: 4.5 K/UL — SIGNIFICANT CHANGE UP (ref 3.8–10.5)
WBC # FLD AUTO: 4.5 K/UL — SIGNIFICANT CHANGE UP (ref 3.8–10.5)

## 2020-09-18 PROCEDURE — 99213 OFFICE O/P EST LOW 20 MIN: CPT

## 2020-09-18 NOTE — ASSESSMENT
[FreeTextEntry1] : She is a 65 y/o F with recurrent lobular right breast cancer that recurred 3 years off endocrine therapy. She has been on letrozole since 1/2015. She continues to tolerate letrozole without any new signs or symptoms of breast cancer recurrence. She will have bone density in April and will have follow up. She will continue with walking to maintain healthy weight. Next follow up in 6 months but earlier if any new symptoms. Blood work remains WNL on letrozole. \par

## 2020-09-18 NOTE — REVIEW OF SYSTEMS
[Negative] : Allergic/Immunologic [Skin Rash] : no skin rash [Skin Wound] : no skin wound [Confused] : no confusion [Dizziness] : no dizziness [Fainting] : no fainting [Difficulty Walking] : difficulty walking [de-identified] : discomfort over the R reconstruction  [de-identified] : walks with crutch

## 2020-09-18 NOTE — HISTORY OF PRESENT ILLNESS
[Disease: _____________________] : Disease: [unfilled] [de-identified] : She was initially diagnosed with breast cancer at age 52 with multifocal right breast cancer.  She had bilateral mastectomies and sentinel lymph node biopsy.  She had in the right breast a 1.5 cm infiltrating lobular carcinoma with negative sentinel lymph nodes.  The ER was 99%, CT was 99%, Het2xin was negative.  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  She received for Stage I breast cancer: AC followed by T dose dense from 6/2007 to 10/2007.  She was then placed on tamoxifen from 2007 and switched to anastrozole to complete 5 years of treatment on 12/1/2012.  On follow up in 2014, she had new palpable right breast finding and biopsy confirmed breast cancer.  On 12/3/14, she had right breast mass excision with axillary lymph node dissection.  The pathology showed invasive lobular carcinoma involving the dermis and superficial subcutaneous tissue along with 1 out of 15 lymph nodes involved by carcinoma.  The ER was 95%, CT was 95%, Rqi8ojk was CISH negative. She was started on letrozole. She had worsening back pain from spinal stenosis despite physical therapy. She had L4-5 posterior interbody fusion on 3/2017. She developed postoperative R calf DVT and was started initially on warfarin then Xarelto which was stopped after anticoagulation course and physical therapy to improve mobility.  [de-identified] : invasive lobular ER 95%, CA 95%, Bqz4xyj CISH negative [de-identified] : dd ACT 6/2007 to 10/2007\par tamoxifen then anastrozole 2007 to 12/2012\par Letrozole 1/2015 to present  [de-identified] : Since last visit, she lost her mother due to illness in September. She is dealing with her loss well and has support.  She has been able to self isolate during covid. She continues to maintain her port. Last bone density was 4/2019 and will set up for 4/2021. Denies any new chest wall changes; has occasional discomfort over the R anterior reconstruction. No new medications. Taking letrozole without any issues.

## 2020-09-18 NOTE — PHYSICAL EXAM
[Restricted in physically strenuous activity but ambulatory and able to carry out work of a light or sedentary nature] : Status 1- Restricted in physically strenuous activity but ambulatory and able to carry out work of a light or sedentary nature, e.g., light house work, office work [Normal] : affect appropriate [de-identified] : facial droop chronic  [de-identified] : bilateral reconstruction with post surgical scar R axilla  [de-identified] : ambulating with crutch and L leg brace  [de-identified] : able to get up and switch over the crutches

## 2020-09-18 NOTE — CONSULT LETTER
[Dear  ___] : Dear  [unfilled], [Courtesy Letter:] : I had the pleasure of seeing your patient, [unfilled], in my office today. [Please see my note below.] : Please see my note below. [Consult Closing:] : Thank you very much for allowing me to participate in the care of this patient.  If you have any questions, please do not hesitate to contact me. [Sincerely,] : Sincerely, [FreeTextEntry2] : Susan Palleschi MD\par [address]\[place] [address] [FreeTextEntry3] : Tito Norton MD\par Attending\par [facility]\par

## 2020-09-21 ENCOUNTER — APPOINTMENT (OUTPATIENT)
Dept: GASTROENTEROLOGY | Facility: CLINIC | Age: 65
End: 2020-09-21
Payer: MEDICARE

## 2020-09-21 VITALS
HEART RATE: 73 BPM | BODY MASS INDEX: 27.99 KG/M2 | SYSTOLIC BLOOD PRESSURE: 130 MMHG | HEIGHT: 65 IN | WEIGHT: 168 LBS | DIASTOLIC BLOOD PRESSURE: 80 MMHG | TEMPERATURE: 97.1 F | OXYGEN SATURATION: 97 %

## 2020-09-21 PROCEDURE — 99213 OFFICE O/P EST LOW 20 MIN: CPT

## 2020-09-21 RX ORDER — RANITIDINE HCL 150 MG
150 CAPSULE ORAL
Refills: 0 | Status: DISCONTINUED | COMMUNITY
End: 2020-09-21

## 2020-09-21 NOTE — HISTORY OF PRESENT ILLNESS
[FreeTextEntry1] : Lucrecia Presents for a followup visit. She ran out of omeprazole and has not been taking any medications for heartburn for the past few weeks. She denies heartburn symptoms off medications. She denies dysphagia or odynophagia. She denies abdominal pain. She denies changes in bowel habits.

## 2020-09-21 NOTE — ASSESSMENT
[FreeTextEntry1] : This is a 65-year-old female with history chronic GERD. She can take over-the-counter Pepcid as needed for heartburn symptoms. She needs a screening colonoscopy October 2021. She is to call me she's questions or concerns otherwise I will see her for follow up in one year.

## 2020-10-01 ENCOUNTER — APPOINTMENT (OUTPATIENT)
Dept: INTERNAL MEDICINE | Facility: CLINIC | Age: 65
End: 2020-10-01

## 2020-10-14 ENCOUNTER — APPOINTMENT (OUTPATIENT)
Dept: MRI IMAGING | Facility: IMAGING CENTER | Age: 65
End: 2020-10-14
Payer: MEDICARE

## 2020-10-14 ENCOUNTER — OUTPATIENT (OUTPATIENT)
Dept: OUTPATIENT SERVICES | Facility: HOSPITAL | Age: 65
LOS: 1 days | End: 2020-10-14
Payer: MEDICARE

## 2020-10-14 DIAGNOSIS — G56.00 CARPAL TUNNEL SYNDROME, UNSPECIFIED UPPER LIMB: ICD-10-CM

## 2020-10-14 DIAGNOSIS — G95.9 DISEASE OF SPINAL CORD, UNSPECIFIED: ICD-10-CM

## 2020-10-14 DIAGNOSIS — C43.72 MALIGNANT MELANOMA OF LEFT LOWER LIMB, INCLUDING HIP: Chronic | ICD-10-CM

## 2020-10-14 DIAGNOSIS — H50.9 UNSPECIFIED STRABISMUS: Chronic | ICD-10-CM

## 2020-10-14 DIAGNOSIS — Z98.89 OTHER SPECIFIED POSTPROCEDURAL STATES: Chronic | ICD-10-CM

## 2020-10-14 DIAGNOSIS — C50.911 MALIGNANT NEOPLASM OF UNSPECIFIED SITE OF RIGHT FEMALE BREAST: Chronic | ICD-10-CM

## 2020-10-14 DIAGNOSIS — C07 MALIGNANT NEOPLASM OF PAROTID GLAND: Chronic | ICD-10-CM

## 2020-10-14 PROCEDURE — 72141 MRI NECK SPINE W/O DYE: CPT

## 2020-10-14 PROCEDURE — 72141 MRI NECK SPINE W/O DYE: CPT | Mod: 26

## 2020-10-16 ENCOUNTER — APPOINTMENT (OUTPATIENT)
Dept: OPHTHALMOLOGY | Facility: CLINIC | Age: 65
End: 2020-10-16
Payer: MEDICARE

## 2020-10-16 ENCOUNTER — NON-APPOINTMENT (OUTPATIENT)
Age: 65
End: 2020-10-16

## 2020-10-16 PROCEDURE — 92012 INTRM OPH EXAM EST PATIENT: CPT

## 2020-11-10 ENCOUNTER — APPOINTMENT (OUTPATIENT)
Dept: OTOLARYNGOLOGY | Facility: CLINIC | Age: 65
End: 2020-11-10
Payer: MEDICARE

## 2020-11-10 VITALS
HEIGHT: 65 IN | BODY MASS INDEX: 28.66 KG/M2 | DIASTOLIC BLOOD PRESSURE: 80 MMHG | WEIGHT: 172 LBS | HEART RATE: 77 BPM | SYSTOLIC BLOOD PRESSURE: 121 MMHG

## 2020-11-10 PROCEDURE — 99214 OFFICE O/P EST MOD 30 MIN: CPT | Mod: 25

## 2020-11-10 PROCEDURE — 69210 REMOVE IMPACTED EAR WAX UNI: CPT

## 2020-11-10 NOTE — REASON FOR VISIT
[Subsequent Evaluation] : a subsequent evaluation for [FreeTextEntry2] : f/u left side parapharyngeal

## 2020-11-10 NOTE — ASSESSMENT
[FreeTextEntry1] : Pt remains BRODY and parapharyngeal schwannoma remains clinically non apparent.  Will continue to observe.

## 2020-11-10 NOTE — CONSULT LETTER
[Dear  ___] : Dear  [unfilled], [Consult Letter:] : I had the pleasure of evaluating your patient, [unfilled]. [Please see my note below.] : Please see my note below. [Consult Closing:] : Thank you very much for allowing me to participate in the care of this patient.  If you have any questions, please do not hesitate to contact me. [Sincerely,] : Sincerely, [FreeTextEntry2] : Terrie Ortiz MD (Jeremy CHATMAN)\par  [FreeTextEntry3] : James Tompkins MD, FACS\par Chief of Otolaryngology at [facility]\par \par Dept. of Otolaryngology\par [facility]\par   [DrJimi  ___] : Dr. LAWSON

## 2020-11-10 NOTE — HISTORY OF PRESENT ILLNESS
[de-identified] : 65 year female with history of mass in left parapharyngeal space for over 15 years presents for follow up. S/p right parotid cancer about 15 years ago and the lesion was picked up on a f/u scan. Reports mass has been stable in size. Denies pain in mouth mass. Denies dysphagia, odynophagia, dyspnea, dysphonia, difficulties with neck movement. States weight stable. MRI 03/02/2019.

## 2020-11-10 NOTE — PHYSICAL EXAM
[de-identified] : No mass palpable.  Right parotid scar present [de-identified] : CI on R.  Removed.   [Midline] : trachea located in midline position [Laryngoscopy Performed] : laryngoscopy was performed, see procedure section for findings [Normal] : no rashes [FreeTextEntry2] : Right facial synkinesis [de-identified] : Pt wheelchair bound but can stand with support [de-identified] : Facial synkinesis

## 2020-11-12 ENCOUNTER — OUTPATIENT (OUTPATIENT)
Dept: OUTPATIENT SERVICES | Facility: HOSPITAL | Age: 65
LOS: 1 days | Discharge: HOME | End: 2020-11-12

## 2020-11-12 DIAGNOSIS — C50.911 MALIGNANT NEOPLASM OF UNSPECIFIED SITE OF RIGHT FEMALE BREAST: Chronic | ICD-10-CM

## 2020-11-12 DIAGNOSIS — H50.9 UNSPECIFIED STRABISMUS: Chronic | ICD-10-CM

## 2020-11-12 DIAGNOSIS — C50.919 MALIGNANT NEOPLASM OF UNSPECIFIED SITE OF UNSPECIFIED FEMALE BREAST: ICD-10-CM

## 2020-11-12 DIAGNOSIS — Z98.89 OTHER SPECIFIED POSTPROCEDURAL STATES: Chronic | ICD-10-CM

## 2020-11-12 DIAGNOSIS — C43.72 MALIGNANT MELANOMA OF LEFT LOWER LIMB, INCLUDING HIP: Chronic | ICD-10-CM

## 2020-11-12 DIAGNOSIS — C07 MALIGNANT NEOPLASM OF PAROTID GLAND: Chronic | ICD-10-CM

## 2020-11-18 ENCOUNTER — APPOINTMENT (OUTPATIENT)
Dept: INFUSION THERAPY | Facility: HOSPITAL | Age: 65
End: 2020-11-18

## 2020-12-07 ENCOUNTER — APPOINTMENT (OUTPATIENT)
Dept: SURGICAL ONCOLOGY | Facility: CLINIC | Age: 65
End: 2020-12-07
Payer: MEDICARE

## 2020-12-07 VITALS
WEIGHT: 172 LBS | HEART RATE: 75 BPM | OXYGEN SATURATION: 95 % | BODY MASS INDEX: 28.66 KG/M2 | HEIGHT: 65 IN | SYSTOLIC BLOOD PRESSURE: 146 MMHG | DIASTOLIC BLOOD PRESSURE: 90 MMHG

## 2020-12-07 PROCEDURE — 99214 OFFICE O/P EST MOD 30 MIN: CPT

## 2020-12-07 NOTE — PHYSICAL EXAM
[Normal] : supple, no neck mass and thyroid not enlarged [Normal Neck Lymph Nodes] : normal neck lymph nodes  [Normal Supraclavicular Lymph Nodes] : normal supraclavicular lymph nodes [Normal Groin Lymph Nodes] : normal groin lymph nodes [Normal Axillary Lymph Nodes] : normal axillary lymph nodes [Normal] : full range of motion and no deformities appreciated [de-identified] : Below

## 2020-12-07 NOTE — ASSESSMENT
[FreeTextEntry1] : Clinically doing well.\par \par Presently no imaging studies are warranted.\par \par To see her in another year, sooner if needed

## 2020-12-16 PROBLEM — Z01.419 ENCOUNTER FOR GYNECOLOGICAL EXAMINATION WITH PAPANICOLAOU SMEAR OF CERVIX: Status: RESOLVED | Noted: 2018-12-13 | Resolved: 2020-12-16

## 2020-12-22 ENCOUNTER — APPOINTMENT (OUTPATIENT)
Dept: VASCULAR SURGERY | Facility: CLINIC | Age: 65
End: 2020-12-22
Payer: MEDICARE

## 2020-12-22 VITALS
SYSTOLIC BLOOD PRESSURE: 154 MMHG | HEIGHT: 65 IN | DIASTOLIC BLOOD PRESSURE: 93 MMHG | BODY MASS INDEX: 29.66 KG/M2 | WEIGHT: 178 LBS | TEMPERATURE: 97.7 F | HEART RATE: 73 BPM

## 2020-12-22 PROCEDURE — 93923 UPR/LXTR ART STDY 3+ LVLS: CPT

## 2020-12-22 PROCEDURE — 99214 OFFICE O/P EST MOD 30 MIN: CPT

## 2020-12-22 NOTE — PHYSICAL EXAM
[Normal Breath Sounds] : Normal breath sounds [2+] : left 2+ [1+] : left 1+ [Ankle Swelling (On Exam)] : present [Ankle Swelling Bilaterally] : bilaterally  [Varicose Veins Of Lower Extremities] : bilaterally [] : bilaterally [Ankle Swelling On The Right] : mild [No HSM] : no hepatosplenomegaly [No Rash or Lesion] : No rash or lesion [Alert] : alert [Oriented to Person] : oriented to person [Oriented to Place] : oriented to place [Oriented to Time] : oriented to time [Calm] : calm [JVD] : no jugular venous distention  [Right Carotid Bruit] : right carotid bruit heard [Left Carotid Bruit] : left carotid bruit heard [Abdomen Masses] : No abdominal masses [Tender] : was nontender [Stool Sample Taken] : No stool obtained  on rectal exam [de-identified] : nad [de-identified] : wnl [FreeTextEntry1] : mild to mod art insuff w mod trophic skin changes \par mild venous insufff w mild  thi le edema and mild st dermatitis from knee distally \par multiple small v veins and spider v thi shins and ankles  [de-identified] : wnl [de-identified] : ambulates w cane  [de-identified] : cn 2-12 thi grossly intact  [de-identified] : cooperative

## 2020-12-22 NOTE — ASSESSMENT
[Arterial/Venous Disease] : arterial/venous disease [Medication Management] : medication management [FreeTextEntry1] : Impression le c/o from combo of arterial insuff and neurogenic from spinal stenosis w clinical improvement in le  art insuff sx \par \par Plan med conserv managemnt  exercise program prn, protective measures \par restart  pletal 50 bid\par pt will call in jan 2021 when her new insurance coverage is activated to  get new pletal rx \par carotid duplex s/o stenosis next avail then telehealth\par ov w ben/pvr s/o art insuff 12mo dec   2021\par

## 2020-12-22 NOTE — HISTORY OF PRESENT ILLNESS
[FreeTextEntry1] : pt is s/p spinal stenosis surgery pt is receiving rehab\par since this surgery pt c/o thi foot numbness \par pt c/o nocturnal leg and foot cramps 1-2x/week\par intensity  mod to severe and currently worsening \par worse w lying in bed \par onset sev years ago w recent worsening \par pt states that she is in wheelchair and transfers  and can only ambulate w walker w difficulty  [de-identified] : pt was on pletal 50 bid and states that she stopped this rx once it ran out \par Pt states since last ov  thi  leg and foot nocturnal cramps 1-2x/night remain\par intensity moderate \par pt is wearing a  leg brace\par

## 2020-12-22 NOTE — DATA REVIEWED
[FreeTextEntry1] : 6/20/2018 AID patent AA and thi iliac arterial systems  no sig stenosis\par \par 6/20/2018 BELEM/PVR mild to mod infragenic art insuff w vessel calcification  Rt BELEM 1.21 lt BELEM 1.17\par \par 7/2/2019 BELEM/PVR mild to mod infragenic art insuff w vessel calcification  Rt BELEM 1.19 lt BELEM 1.19\par \par 12/22/2020 BELEM/PVR mild to mod infragenic art insuff w vessel calcification  Rt BELEM 1.28 lt BELEM 1.23

## 2021-01-08 ENCOUNTER — OUTPATIENT (OUTPATIENT)
Dept: OUTPATIENT SERVICES | Facility: HOSPITAL | Age: 66
LOS: 1 days | Discharge: HOME | End: 2021-01-08

## 2021-01-08 DIAGNOSIS — C07 MALIGNANT NEOPLASM OF PAROTID GLAND: Chronic | ICD-10-CM

## 2021-01-08 DIAGNOSIS — C50.919 MALIGNANT NEOPLASM OF UNSPECIFIED SITE OF UNSPECIFIED FEMALE BREAST: ICD-10-CM

## 2021-01-08 DIAGNOSIS — H50.9 UNSPECIFIED STRABISMUS: Chronic | ICD-10-CM

## 2021-01-08 DIAGNOSIS — C50.911 MALIGNANT NEOPLASM OF UNSPECIFIED SITE OF RIGHT FEMALE BREAST: Chronic | ICD-10-CM

## 2021-01-08 DIAGNOSIS — Z98.89 OTHER SPECIFIED POSTPROCEDURAL STATES: Chronic | ICD-10-CM

## 2021-01-08 DIAGNOSIS — C43.72 MALIGNANT MELANOMA OF LEFT LOWER LIMB, INCLUDING HIP: Chronic | ICD-10-CM

## 2021-01-13 ENCOUNTER — LABORATORY RESULT (OUTPATIENT)
Age: 66
End: 2021-01-13

## 2021-01-13 ENCOUNTER — RESULT REVIEW (OUTPATIENT)
Age: 66
End: 2021-01-13

## 2021-01-13 ENCOUNTER — APPOINTMENT (OUTPATIENT)
Dept: INFUSION THERAPY | Facility: HOSPITAL | Age: 66
End: 2021-01-13

## 2021-01-13 LAB
BASOPHILS # BLD AUTO: 0.03 K/UL — SIGNIFICANT CHANGE UP (ref 0–0.2)
BASOPHILS NFR BLD AUTO: 0.5 % — SIGNIFICANT CHANGE UP (ref 0–2)
EOSINOPHIL # BLD AUTO: 0.07 K/UL — SIGNIFICANT CHANGE UP (ref 0–0.5)
EOSINOPHIL NFR BLD AUTO: 1.1 % — SIGNIFICANT CHANGE UP (ref 0–6)
HCT VFR BLD CALC: 37.2 % — SIGNIFICANT CHANGE UP (ref 34.5–45)
HGB BLD-MCNC: 12.2 G/DL — SIGNIFICANT CHANGE UP (ref 11.5–15.5)
IMM GRANULOCYTES NFR BLD AUTO: 0.3 % — SIGNIFICANT CHANGE UP (ref 0–1.5)
LYMPHOCYTES # BLD AUTO: 1.34 K/UL — SIGNIFICANT CHANGE UP (ref 1–3.3)
LYMPHOCYTES # BLD AUTO: 21.6 % — SIGNIFICANT CHANGE UP (ref 13–44)
MCHC RBC-ENTMCNC: 31.2 PG — SIGNIFICANT CHANGE UP (ref 27–34)
MCHC RBC-ENTMCNC: 32.8 G/DL — SIGNIFICANT CHANGE UP (ref 32–36)
MCV RBC AUTO: 95.1 FL — SIGNIFICANT CHANGE UP (ref 80–100)
MONOCYTES # BLD AUTO: 0.32 K/UL — SIGNIFICANT CHANGE UP (ref 0–0.9)
MONOCYTES NFR BLD AUTO: 5.2 % — SIGNIFICANT CHANGE UP (ref 2–14)
NEUTROPHILS # BLD AUTO: 4.43 K/UL — SIGNIFICANT CHANGE UP (ref 1.8–7.4)
NEUTROPHILS NFR BLD AUTO: 71.3 % — SIGNIFICANT CHANGE UP (ref 43–77)
NRBC # BLD: 0 /100 WBCS — SIGNIFICANT CHANGE UP (ref 0–0)
PLATELET # BLD AUTO: 219 K/UL — SIGNIFICANT CHANGE UP (ref 150–400)
RBC # BLD: 3.91 M/UL — SIGNIFICANT CHANGE UP (ref 3.8–5.2)
RBC # FLD: 12.1 % — SIGNIFICANT CHANGE UP (ref 10.3–14.5)
WBC # BLD: 6.21 K/UL — SIGNIFICANT CHANGE UP (ref 3.8–10.5)
WBC # FLD AUTO: 6.21 K/UL — SIGNIFICANT CHANGE UP (ref 3.8–10.5)

## 2021-01-21 ENCOUNTER — APPOINTMENT (OUTPATIENT)
Dept: VASCULAR SURGERY | Facility: CLINIC | Age: 66
End: 2021-01-21
Payer: MEDICARE

## 2021-01-21 PROCEDURE — 93880 EXTRACRANIAL BILAT STUDY: CPT

## 2021-01-25 ENCOUNTER — APPOINTMENT (OUTPATIENT)
Dept: OBGYN | Facility: CLINIC | Age: 66
End: 2021-01-25
Payer: MEDICARE

## 2021-01-25 VITALS
HEIGHT: 65 IN | TEMPERATURE: 97.3 F | BODY MASS INDEX: 28.82 KG/M2 | DIASTOLIC BLOOD PRESSURE: 82 MMHG | SYSTOLIC BLOOD PRESSURE: 140 MMHG | WEIGHT: 173 LBS

## 2021-01-25 DIAGNOSIS — Z01.419 ENCOUNTER FOR GYNECOLOGICAL EXAMINATION (GENERAL) (ROUTINE) W/OUT ABNORMAL FINDINGS: ICD-10-CM

## 2021-01-25 DIAGNOSIS — D25.0 SUBMUCOUS LEIOMYOMA OF UTERUS: ICD-10-CM

## 2021-01-25 PROCEDURE — G0101: CPT

## 2021-01-25 NOTE — DISCUSSION/SUMMARY
[FreeTextEntry1] : 66 y/o postmenopausal woman, Hx of breast Ca, S/P Bilateral mastectomy and reconstruciton with Dr. Palleschi and Dr. Roy, currently on letrazole  S/P Parathyroid Ca, + melanoma followed by Dr. Esqueda\par Pt consulted Dr. Oreilly for a submucous fibroid/postmenopausal bleeding, conservative management as per patient \par Patient stable \par Pap with cotesting\par Pt scheduled for colonoscopy with Dr. Kuo\par Pelvic U/S to evaluated endometrium, f/u with Dr. Oreilly once results avaialble\par F/u 1 year/PRN

## 2021-01-25 NOTE — HISTORY OF PRESENT ILLNESS
[FreeTextEntry1] : 64 y/o postmenopausal woman, Hx of breast Ca, S/P Bilateral mastectomy and reconstruciton with Dr. Palleschi and Dr. Roy, currently on letrazole  S/P Parathyroid Ca, + melanoma followed by Dr. Esqueda\par Pt consulted Dr. Oreilly for a submucous fibroid/postmenopausal bleeding, conservative management as per patient \par no VB\par no pain \par Has been healthy during COVID \par Pap 2018 neg, Hx of +HPV

## 2021-01-27 ENCOUNTER — APPOINTMENT (OUTPATIENT)
Dept: VASCULAR SURGERY | Facility: CLINIC | Age: 66
End: 2021-01-27
Payer: MEDICARE

## 2021-01-27 PROCEDURE — 99213 OFFICE O/P EST LOW 20 MIN: CPT

## 2021-01-27 NOTE — DATA REVIEWED
[FreeTextEntry1] : 6/20/2018 AID patent AA and damien iliac arterial systems  no sig stenosis\par \par 6/20/2018 BELEM/PVR mild to mod infragenic art insuff w vessel calcification  Rt BELEM 1.21 lt BELEM 1.17\par \par 7/2/2019 BELEM/PVR mild to mod infragenic art insuff w vessel calcification  Rt BELEM 1.19 lt BELEM 1.19\par \par 12/22/2020 BELEM/PVR mild to mod infragenic art insuff w vessel calcification  Rt BELEM 1.28 lt BELEM 1.23\par \par 1/21/2021 Carotid Duplex  Rt ICA  less 50% stenosis (179/66) by velocity criteria\par                          Lt  ICA  less 50% stenosis (109/54) by velocity criteria\par                          Damien Ant Vertebral Arterial Flow \par \par

## 2021-01-27 NOTE — HISTORY OF PRESENT ILLNESS
[FreeTextEntry1] : pt is s/p spinal stenosis surgery pt is receiving rehab\par since this surgery pt c/o thi foot numbness \par pt c/o nocturnal leg and foot cramps 1-2x/week\par intensity  mod to severe and currently worsening \par worse w lying in bed \par onset sev years ago w recent worsening \par pt states that she is in wheelchair and transfers  and can only ambulate w walker w difficulty  [de-identified] : Pt states  ongoing  thi  leg and foot nocturnal cramps 1-2x/night remain\par intensity moderate  since last ov \par pt has not restarted pletal rx yet \par pt states no le wounds

## 2021-01-27 NOTE — ASSESSMENT
[Arterial/Venous Disease] : arterial/venous disease [Medication Management] : medication management [FreeTextEntry1] : Impression le c/o from combo of arterial insuff and neurogenic from spinal stenosis w ongoing  le  art insuff sx \par \par Plan med conserv managemnt  exercise program prn, protective measures \par restart  pletal 50 bid \par carotid duplex s/o stenosis 12 mo jan 2022 then telehealth\par ben/pvr s/o art insuff 12mo dec   2021 then telehealth\par telehealth in 6 weeks to re eval le art insuff sx \par rto prn \par   [Foot care/Footwear] : foot care/footwear

## 2021-01-27 NOTE — PHYSICAL EXAM
[Alert] : alert [Oriented to Person] : oriented to person [Oriented to Place] : oriented to place [Oriented to Time] : oriented to time [Calm] : calm [Stool Sample Taken] : No stool obtained  on rectal exam [de-identified] : no resp distress [FreeTextEntry1] : Physical exam findings via telephonic review with patient \par \par  [de-identified] : ambulates w cane  [de-identified] : cooperative

## 2021-01-27 NOTE — REASON FOR VISIT
[Home] : at home, [unfilled] , at the time of the visit. [Medical Office: ([facility])___] : at the medical office located in  [Other:____] : [unfilled] [Verbal consent obtained from patient] : the patient, [unfilled] [FreeTextEntry1] : My legs bother me

## 2021-01-28 ENCOUNTER — NON-APPOINTMENT (OUTPATIENT)
Age: 66
End: 2021-01-28

## 2021-01-29 ENCOUNTER — APPOINTMENT (OUTPATIENT)
Dept: RADIATION ONCOLOGY | Facility: CLINIC | Age: 66
End: 2021-01-29
Payer: MEDICARE

## 2021-01-29 VITALS
RESPIRATION RATE: 15 BRPM | WEIGHT: 176.7 LBS | DIASTOLIC BLOOD PRESSURE: 96 MMHG | BODY MASS INDEX: 29.4 KG/M2 | TEMPERATURE: 96.5 F | OXYGEN SATURATION: 97 % | SYSTOLIC BLOOD PRESSURE: 148 MMHG | HEART RATE: 72 BPM

## 2021-01-29 LAB — CYTOLOGY CVX/VAG DOC THIN PREP: ABNORMAL

## 2021-01-29 PROCEDURE — 99212 OFFICE O/P EST SF 10 MIN: CPT

## 2021-01-29 RX ORDER — CILOSTAZOL 50 MG/1
50 TABLET ORAL
Qty: 60 | Refills: 6 | Status: DISCONTINUED | COMMUNITY
Start: 2018-12-14 | End: 2021-01-29

## 2021-02-01 ENCOUNTER — APPOINTMENT (OUTPATIENT)
Dept: ULTRASOUND IMAGING | Facility: IMAGING CENTER | Age: 66
End: 2021-02-01

## 2021-02-02 ENCOUNTER — APPOINTMENT (OUTPATIENT)
Dept: INTERNAL MEDICINE | Facility: CLINIC | Age: 66
End: 2021-02-02

## 2021-02-09 NOTE — HISTORY OF PRESENT ILLNESS
[FreeTextEntry1] : Ms. Ocampo is a 65 y F with recurrent lobular carcinoma of the R breast who received radiotherapy s/p resection and lymph node sampling for recurrent breast cancer, ER/AL+, Svg2ogy -. She was treated with high tangents to right reconstructed chest wall with a boost to tumor bed to 50 Gy/25 fractions then boost of 10 Gy/2 fractions. She completed treatment on 05/15/15. Expanders removed 7/7/16. She is currently on letrozole since 1/2015. \par \par Patient completed an MRI on 8/2/17 status post L4-5 posterior lumbar interbody fusion laminectomy (3/23/17) with good posterior decompression of the spinal canal. There is a minimal anterolisthesis of L4 upon L5. As per chart, post-operatively, the patient developed a R calf DVT and was treated with warfarin, now on Xarelto. \par \par Interval History: \par She presents today for follow up. She continues to reports sharp pains under the right breast that is intermittent. Occasionaly she has to take Tylenol with relief. Continues on letrozole and tolerating. Denies any hot flashes. Continues to follow up with Dr. Norton. Pending bone scan, TVU as scheduled. Has b/l foot numbness, leg and foot cramping - was started on Pletal. She continues to follow up with her vascular surgeon.

## 2021-02-09 NOTE — PHYSICAL EXAM
[] : no respiratory distress [Respiration, Rhythm And Depth] : normal respiratory rhythm and effort [Exaggerated Use Of Accessory Muscles For Inspiration] : no accessory muscle use [Normal] : oriented to person, place and time, the affect was normal, the mood was normal and not anxious [de-identified] : left and right reconstruction with implants, well healed, no masses or edema, skin soft without nodule or erythema, no axillary mass tenderness "inframammary" fold on right at about 6 oclock

## 2021-03-12 ENCOUNTER — OUTPATIENT (OUTPATIENT)
Dept: OUTPATIENT SERVICES | Facility: HOSPITAL | Age: 66
LOS: 1 days | Discharge: HOME | End: 2021-03-12

## 2021-03-12 DIAGNOSIS — H50.9 UNSPECIFIED STRABISMUS: Chronic | ICD-10-CM

## 2021-03-12 DIAGNOSIS — Z98.89 OTHER SPECIFIED POSTPROCEDURAL STATES: Chronic | ICD-10-CM

## 2021-03-12 DIAGNOSIS — C07 MALIGNANT NEOPLASM OF PAROTID GLAND: Chronic | ICD-10-CM

## 2021-03-12 DIAGNOSIS — C50.919 MALIGNANT NEOPLASM OF UNSPECIFIED SITE OF UNSPECIFIED FEMALE BREAST: ICD-10-CM

## 2021-03-12 DIAGNOSIS — C43.72 MALIGNANT MELANOMA OF LEFT LOWER LIMB, INCLUDING HIP: Chronic | ICD-10-CM

## 2021-03-12 DIAGNOSIS — C50.911 MALIGNANT NEOPLASM OF UNSPECIFIED SITE OF RIGHT FEMALE BREAST: Chronic | ICD-10-CM

## 2021-03-17 ENCOUNTER — LABORATORY RESULT (OUTPATIENT)
Age: 66
End: 2021-03-17

## 2021-03-17 ENCOUNTER — RESULT REVIEW (OUTPATIENT)
Age: 66
End: 2021-03-17

## 2021-03-17 ENCOUNTER — APPOINTMENT (OUTPATIENT)
Dept: INFUSION THERAPY | Facility: HOSPITAL | Age: 66
End: 2021-03-17

## 2021-03-17 LAB
BASOPHILS # BLD AUTO: 0.05 K/UL — SIGNIFICANT CHANGE UP (ref 0–0.2)
BASOPHILS NFR BLD AUTO: 0.8 % — SIGNIFICANT CHANGE UP (ref 0–2)
EOSINOPHIL # BLD AUTO: 0.09 K/UL — SIGNIFICANT CHANGE UP (ref 0–0.5)
EOSINOPHIL NFR BLD AUTO: 1.5 % — SIGNIFICANT CHANGE UP (ref 0–6)
HCT VFR BLD CALC: 37.2 % — SIGNIFICANT CHANGE UP (ref 34.5–45)
HGB BLD-MCNC: 12.3 G/DL — SIGNIFICANT CHANGE UP (ref 11.5–15.5)
IMM GRANULOCYTES NFR BLD AUTO: 0.2 % — SIGNIFICANT CHANGE UP (ref 0–1.5)
LYMPHOCYTES # BLD AUTO: 1.46 K/UL — SIGNIFICANT CHANGE UP (ref 1–3.3)
LYMPHOCYTES # BLD AUTO: 23.8 % — SIGNIFICANT CHANGE UP (ref 13–44)
MCHC RBC-ENTMCNC: 31.6 PG — SIGNIFICANT CHANGE UP (ref 27–34)
MCHC RBC-ENTMCNC: 33.1 G/DL — SIGNIFICANT CHANGE UP (ref 32–36)
MCV RBC AUTO: 95.6 FL — SIGNIFICANT CHANGE UP (ref 80–100)
MONOCYTES # BLD AUTO: 0.35 K/UL — SIGNIFICANT CHANGE UP (ref 0–0.9)
MONOCYTES NFR BLD AUTO: 5.7 % — SIGNIFICANT CHANGE UP (ref 2–14)
NEUTROPHILS # BLD AUTO: 4.18 K/UL — SIGNIFICANT CHANGE UP (ref 1.8–7.4)
NEUTROPHILS NFR BLD AUTO: 68 % — SIGNIFICANT CHANGE UP (ref 43–77)
NRBC # BLD: 0 /100 WBCS — SIGNIFICANT CHANGE UP (ref 0–0)
PLATELET # BLD AUTO: 212 K/UL — SIGNIFICANT CHANGE UP (ref 150–400)
RBC # BLD: 3.89 M/UL — SIGNIFICANT CHANGE UP (ref 3.8–5.2)
RBC # FLD: 12.7 % — SIGNIFICANT CHANGE UP (ref 10.3–14.5)
WBC # BLD: 6.14 K/UL — SIGNIFICANT CHANGE UP (ref 3.8–10.5)
WBC # FLD AUTO: 6.14 K/UL — SIGNIFICANT CHANGE UP (ref 3.8–10.5)

## 2021-03-24 ENCOUNTER — APPOINTMENT (OUTPATIENT)
Dept: VASCULAR SURGERY | Facility: CLINIC | Age: 66
End: 2021-03-24
Payer: MEDICARE

## 2021-03-24 PROCEDURE — 99213 OFFICE O/P EST LOW 20 MIN: CPT

## 2021-03-24 NOTE — PHYSICAL EXAM
[Alert] : alert [Oriented to Person] : oriented to person [Oriented to Place] : oriented to place [Oriented to Time] : oriented to time [Calm] : calm [Stool Sample Taken] : No stool obtained  on rectal exam [de-identified] : no resp distress [FreeTextEntry1] : Physical exam findings via telephonic review with patient \par \par  [de-identified] : cooperative

## 2021-03-24 NOTE — REASON FOR VISIT
[Home] : at home, [unfilled] , at the time of the visit. [Medical Office: ([facility])___] : at the medical office located in  [Other:____] : [unfilled] [Verbal consent obtained from patient] : the patient, [unfilled] [FreeTextEntry1] : My legs bother me

## 2021-03-24 NOTE — HISTORY OF PRESENT ILLNESS
[FreeTextEntry1] : pt is s/p spinal stenosis surgery pt is receiving rehab\par since this surgery pt c/o thi foot numbness \par pt c/o nocturnal leg and foot cramps 1-2x/week\par intensity  mod to severe and currently worsening \par worse w lying in bed \par onset sev years ago w recent worsening \par pt states that she is in wheelchair and transfers  and can only ambulate w walker w difficulty  [de-identified] : Pt states  ongoing  thi  leg and foot nocturnal cramps 1-2x/night remain\par w a improvement \par intensity mild   since last ov \par pt is currently on trental \par pt states no le wounds \par pt was last seen by neurologist in feb 2021 for spinal stenosis \par no surgical plan at this time \par pt states no le wounds

## 2021-03-24 NOTE — ASSESSMENT
[Arterial/Venous Disease] : arterial/venous disease [Medication Management] : medication management [Foot care/Footwear] : foot care/footwear [FreeTextEntry1] : Impression le c/o from combo of arterial insuff and neurogenic from spinal stenosis w ongoing  le  art insuff sx \par \par Plan med conserv managemnt  exercise program prn, protective measures \par continue  trental 400 tid\par d/w pt that if limited sx improvement  she can have a re trial of pletal  50 bid if financially she is able to afford  pletal 50 bid \par carotid duplex s/o stenosis 12 mo jan 2022 then telehealth\par ben/pvr s/o art insuff 12mo dec   2021 then telehealth\par telehealth june /july 2021  to re eval le art insuff sx \par rto prn \par Telephonic visit  time duration 10 min\par \par

## 2021-04-04 ENCOUNTER — RESULT REVIEW (OUTPATIENT)
Age: 66
End: 2021-04-04

## 2021-04-06 ENCOUNTER — OUTPATIENT (OUTPATIENT)
Dept: OUTPATIENT SERVICES | Facility: HOSPITAL | Age: 66
LOS: 1 days | End: 2021-04-06
Payer: MEDICARE

## 2021-04-06 ENCOUNTER — APPOINTMENT (OUTPATIENT)
Dept: RADIOLOGY | Facility: IMAGING CENTER | Age: 66
End: 2021-04-06
Payer: MEDICARE

## 2021-04-06 ENCOUNTER — APPOINTMENT (OUTPATIENT)
Dept: ULTRASOUND IMAGING | Facility: IMAGING CENTER | Age: 66
End: 2021-04-06
Payer: MEDICARE

## 2021-04-06 ENCOUNTER — RESULT REVIEW (OUTPATIENT)
Age: 66
End: 2021-04-06

## 2021-04-06 DIAGNOSIS — H50.9 UNSPECIFIED STRABISMUS: Chronic | ICD-10-CM

## 2021-04-06 DIAGNOSIS — C50.911 MALIGNANT NEOPLASM OF UNSPECIFIED SITE OF RIGHT FEMALE BREAST: Chronic | ICD-10-CM

## 2021-04-06 DIAGNOSIS — C43.72 MALIGNANT MELANOMA OF LEFT LOWER LIMB, INCLUDING HIP: Chronic | ICD-10-CM

## 2021-04-06 DIAGNOSIS — Z98.89 OTHER SPECIFIED POSTPROCEDURAL STATES: Chronic | ICD-10-CM

## 2021-04-06 DIAGNOSIS — Z00.8 ENCOUNTER FOR OTHER GENERAL EXAMINATION: ICD-10-CM

## 2021-04-06 DIAGNOSIS — C07 MALIGNANT NEOPLASM OF PAROTID GLAND: Chronic | ICD-10-CM

## 2021-04-06 PROCEDURE — 76830 TRANSVAGINAL US NON-OB: CPT | Mod: 26

## 2021-04-06 PROCEDURE — 76856 US EXAM PELVIC COMPLETE: CPT | Mod: 26

## 2021-04-06 PROCEDURE — 77080 DXA BONE DENSITY AXIAL: CPT | Mod: 26

## 2021-04-06 PROCEDURE — 76856 US EXAM PELVIC COMPLETE: CPT

## 2021-04-06 PROCEDURE — 76830 TRANSVAGINAL US NON-OB: CPT

## 2021-04-06 PROCEDURE — 77080 DXA BONE DENSITY AXIAL: CPT

## 2021-04-13 ENCOUNTER — OUTPATIENT (OUTPATIENT)
Dept: OUTPATIENT SERVICES | Facility: HOSPITAL | Age: 66
LOS: 1 days | Discharge: HOME | End: 2021-04-13

## 2021-04-13 DIAGNOSIS — C50.911 MALIGNANT NEOPLASM OF UNSPECIFIED SITE OF RIGHT FEMALE BREAST: Chronic | ICD-10-CM

## 2021-04-13 DIAGNOSIS — H50.9 UNSPECIFIED STRABISMUS: Chronic | ICD-10-CM

## 2021-04-13 DIAGNOSIS — Z98.89 OTHER SPECIFIED POSTPROCEDURAL STATES: Chronic | ICD-10-CM

## 2021-04-13 DIAGNOSIS — C50.919 MALIGNANT NEOPLASM OF UNSPECIFIED SITE OF UNSPECIFIED FEMALE BREAST: ICD-10-CM

## 2021-04-13 DIAGNOSIS — C43.72 MALIGNANT MELANOMA OF LEFT LOWER LIMB, INCLUDING HIP: Chronic | ICD-10-CM

## 2021-04-13 DIAGNOSIS — C07 MALIGNANT NEOPLASM OF PAROTID GLAND: Chronic | ICD-10-CM

## 2021-04-16 ENCOUNTER — APPOINTMENT (OUTPATIENT)
Dept: HEMATOLOGY ONCOLOGY | Facility: CLINIC | Age: 66
End: 2021-04-16
Payer: MEDICARE

## 2021-04-16 VITALS
RESPIRATION RATE: 16 BRPM | SYSTOLIC BLOOD PRESSURE: 157 MMHG | WEIGHT: 181.22 LBS | HEIGHT: 64.96 IN | OXYGEN SATURATION: 97 % | BODY MASS INDEX: 30.19 KG/M2 | DIASTOLIC BLOOD PRESSURE: 97 MMHG | HEART RATE: 77 BPM | TEMPERATURE: 97.5 F

## 2021-04-16 PROCEDURE — 99214 OFFICE O/P EST MOD 30 MIN: CPT

## 2021-04-16 NOTE — REVIEW OF SYSTEMS
[Joint Pain] : joint pain [Joint Stiffness] : joint stiffness [Difficulty Walking] : difficulty walking [Muscle Pain] : no muscle pain [Muscle Weakness] : no muscle weakness [Confused] : no confusion [Dizziness] : no dizziness [Fainting] : no fainting [Negative] : Neurological [de-identified] : wears splints to help with walking

## 2021-04-16 NOTE — HISTORY OF PRESENT ILLNESS
[Disease: _____________________] : Disease: [unfilled] [de-identified] : She was initially diagnosed with breast cancer at age 52 with multifocal right breast cancer.  She had bilateral mastectomies and sentinel lymph node biopsy.  She had in the right breast a 1.5 cm infiltrating lobular carcinoma with negative sentinel lymph nodes.  The ER was 99%, OH was 99%, Gwi3ecx was negative.  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  She received for Stage I breast cancer: AC followed by T dose dense from 6/2007 to 10/2007.  She was then placed on tamoxifen from 2007 and switched to anastrozole to complete 5 years of treatment on 12/1/2012.  On follow up in 2014, she had new palpable right breast finding and biopsy confirmed breast cancer.  On 12/3/14, she had right breast mass excision with axillary lymph node dissection.  The pathology showed invasive lobular carcinoma involving the dermis and superficial subcutaneous tissue along with 1 out of 15 lymph nodes involved by carcinoma.  The ER was 95%, OH was 95%, Dbn0xnc was CISH negative. She was started on letrozole. She had worsening back pain from spinal stenosis despite physical therapy. She had L4-5 posterior interbody fusion on 3/2017. She developed postoperative R calf DVT and was started initially on warfarin then Xarelto which was stopped after anticoagulation course and physical therapy to improve mobility.  [de-identified] : invasive lobular ER 95%, LA 95%, [id number] CISH negative [de-identified] : dd ACT 6/2007 to 10/2007\par tamoxifen then anastrozole 2007 to 12/2012\par Letrozole 1/2015 to present  [de-identified] : She remains on letrozole and continues to tolerate without any new chest wall pain or discomfort. She denies any new back pain or GI upset. She denies any new medications: takes supplements to help joints: omega 3 and Vitamin D. She is feeling well. Has left shoulder tenderness and will see if any intervention needed. She had Pfizer vaccine x 2 and tolerated well. She is dealing with her mother's passing last year from old age well. She saw her neurologist and vascular MD and will be seeing her PCP.

## 2021-04-16 NOTE — ASSESSMENT
[FreeTextEntry1] : She is a 65 y/o F with recurrent lobular right breast cancer that recurred 3 years off endocrine therapy. She has been on letrozole since 1/2015. She continues to tolerate letrozole without any new signs or symptoms of breast cancer recurrence. She had interval bone density which is stable. She will continue port flushes every 8 weeks. We reviewed her bloodwork: LFTs, tumor markers and CBC WNL. We reviewed signs and symptoms of breast cancer recurrence. Next follow up in 6 months but earlier if any new symptoms.\par

## 2021-04-16 NOTE — PHYSICAL EXAM
[Restricted in physically strenuous activity but ambulatory and able to carry out work of a light or sedentary nature] : Status 1- Restricted in physically strenuous activity but ambulatory and able to carry out work of a light or sedentary nature, e.g., light house work, office work [Normal] : affect appropriate [de-identified] : facial droop chronic  [de-identified] : bilateral reconstruction with post surgical scar R axilla  [de-identified] : ambulating with crutch and L leg brace  [de-identified] : able to get up and switch over the crutches

## 2021-04-20 ENCOUNTER — APPOINTMENT (OUTPATIENT)
Dept: ULTRASOUND IMAGING | Facility: IMAGING CENTER | Age: 66
End: 2021-04-20
Payer: MEDICARE

## 2021-04-20 ENCOUNTER — OUTPATIENT (OUTPATIENT)
Dept: OUTPATIENT SERVICES | Facility: HOSPITAL | Age: 66
LOS: 1 days | End: 2021-04-20
Payer: MEDICARE

## 2021-04-20 DIAGNOSIS — C07 MALIGNANT NEOPLASM OF PAROTID GLAND: Chronic | ICD-10-CM

## 2021-04-20 DIAGNOSIS — C43.72 MALIGNANT MELANOMA OF LEFT LOWER LIMB, INCLUDING HIP: Chronic | ICD-10-CM

## 2021-04-20 DIAGNOSIS — H50.9 UNSPECIFIED STRABISMUS: Chronic | ICD-10-CM

## 2021-04-20 DIAGNOSIS — Z98.89 OTHER SPECIFIED POSTPROCEDURAL STATES: Chronic | ICD-10-CM

## 2021-04-20 DIAGNOSIS — N64.4 MASTODYNIA: ICD-10-CM

## 2021-04-20 DIAGNOSIS — Z85.3 PERSONAL HISTORY OF MALIGNANT NEOPLASM OF BREAST: ICD-10-CM

## 2021-04-20 DIAGNOSIS — C50.911 MALIGNANT NEOPLASM OF UNSPECIFIED SITE OF RIGHT FEMALE BREAST: Chronic | ICD-10-CM

## 2021-04-20 PROCEDURE — 76641 ULTRASOUND BREAST COMPLETE: CPT | Mod: 26,RT

## 2021-04-20 PROCEDURE — 76641 ULTRASOUND BREAST COMPLETE: CPT

## 2021-05-12 ENCOUNTER — LABORATORY RESULT (OUTPATIENT)
Age: 66
End: 2021-05-12

## 2021-05-12 ENCOUNTER — RESULT REVIEW (OUTPATIENT)
Age: 66
End: 2021-05-12

## 2021-05-12 ENCOUNTER — APPOINTMENT (OUTPATIENT)
Dept: INFUSION THERAPY | Facility: HOSPITAL | Age: 66
End: 2021-05-12

## 2021-05-12 LAB
BASOPHILS # BLD AUTO: 0.04 K/UL — SIGNIFICANT CHANGE UP (ref 0–0.2)
BASOPHILS NFR BLD AUTO: 0.7 % — SIGNIFICANT CHANGE UP (ref 0–2)
EOSINOPHIL # BLD AUTO: 0.08 K/UL — SIGNIFICANT CHANGE UP (ref 0–0.5)
EOSINOPHIL NFR BLD AUTO: 1.5 % — SIGNIFICANT CHANGE UP (ref 0–6)
HCT VFR BLD CALC: 40.6 % — SIGNIFICANT CHANGE UP (ref 34.5–45)
HGB BLD-MCNC: 13 G/DL — SIGNIFICANT CHANGE UP (ref 11.5–15.5)
IMM GRANULOCYTES NFR BLD AUTO: 0.4 % — SIGNIFICANT CHANGE UP (ref 0–1.5)
LYMPHOCYTES # BLD AUTO: 1.12 K/UL — SIGNIFICANT CHANGE UP (ref 1–3.3)
LYMPHOCYTES # BLD AUTO: 20.6 % — SIGNIFICANT CHANGE UP (ref 13–44)
MCHC RBC-ENTMCNC: 30.9 PG — SIGNIFICANT CHANGE UP (ref 27–34)
MCHC RBC-ENTMCNC: 32 G/DL — SIGNIFICANT CHANGE UP (ref 32–36)
MCV RBC AUTO: 96.4 FL — SIGNIFICANT CHANGE UP (ref 80–100)
MONOCYTES # BLD AUTO: 0.38 K/UL — SIGNIFICANT CHANGE UP (ref 0–0.9)
MONOCYTES NFR BLD AUTO: 7 % — SIGNIFICANT CHANGE UP (ref 2–14)
NEUTROPHILS # BLD AUTO: 3.8 K/UL — SIGNIFICANT CHANGE UP (ref 1.8–7.4)
NEUTROPHILS NFR BLD AUTO: 69.8 % — SIGNIFICANT CHANGE UP (ref 43–77)
NRBC # BLD: 0 /100 WBCS — SIGNIFICANT CHANGE UP (ref 0–0)
PLATELET # BLD AUTO: 225 K/UL — SIGNIFICANT CHANGE UP (ref 150–400)
RBC # BLD: 4.21 M/UL — SIGNIFICANT CHANGE UP (ref 3.8–5.2)
RBC # FLD: 12.7 % — SIGNIFICANT CHANGE UP (ref 10.3–14.5)
WBC # BLD: 5.44 K/UL — SIGNIFICANT CHANGE UP (ref 3.8–10.5)
WBC # FLD AUTO: 5.44 K/UL — SIGNIFICANT CHANGE UP (ref 3.8–10.5)

## 2021-06-17 ENCOUNTER — NON-APPOINTMENT (OUTPATIENT)
Age: 66
End: 2021-06-17

## 2021-06-17 ENCOUNTER — APPOINTMENT (OUTPATIENT)
Dept: INTERNAL MEDICINE | Facility: CLINIC | Age: 66
End: 2021-06-17
Payer: MEDICARE

## 2021-06-17 VITALS
HEART RATE: 101 BPM | BODY MASS INDEX: 29.99 KG/M2 | DIASTOLIC BLOOD PRESSURE: 70 MMHG | HEIGHT: 64.96 IN | OXYGEN SATURATION: 97 % | SYSTOLIC BLOOD PRESSURE: 120 MMHG | WEIGHT: 180 LBS

## 2021-06-17 DIAGNOSIS — M54.16 RADICULOPATHY, LUMBAR REGION: ICD-10-CM

## 2021-06-17 DIAGNOSIS — M48.00 SPINAL STENOSIS, SITE UNSPECIFIED: ICD-10-CM

## 2021-06-17 PROCEDURE — G0439: CPT

## 2021-06-17 RX ORDER — GABAPENTIN 600 MG/1
600 TABLET, COATED ORAL
Refills: 0 | Status: DISCONTINUED | COMMUNITY
Start: 2019-09-26 | End: 2021-06-17

## 2021-06-17 RX ORDER — PENTOXIFYLLINE 400 MG/1
400 TABLET, EXTENDED RELEASE ORAL
Qty: 270 | Refills: 3 | Status: DISCONTINUED | COMMUNITY
Start: 2021-01-28 | End: 2021-06-17

## 2021-06-18 LAB
CHOLEST SERPL-MCNC: 187 MG/DL
ESTIMATED AVERAGE GLUCOSE: 114 MG/DL
HBA1C MFR BLD HPLC: 5.6 %
HDLC SERPL-MCNC: 76 MG/DL
LDLC SERPL CALC-MCNC: 85 MG/DL
NONHDLC SERPL-MCNC: 111 MG/DL
TRIGL SERPL-MCNC: 132 MG/DL
TSH SERPL-ACNC: 3.54 UIU/ML

## 2021-06-30 ENCOUNTER — APPOINTMENT (OUTPATIENT)
Dept: VASCULAR SURGERY | Facility: CLINIC | Age: 66
End: 2021-06-30
Payer: MEDICARE

## 2021-06-30 ENCOUNTER — APPOINTMENT (OUTPATIENT)
Dept: ORTHOPEDIC SURGERY | Facility: CLINIC | Age: 66
End: 2021-06-30
Payer: MEDICARE

## 2021-06-30 VITALS
WEIGHT: 180 LBS | HEART RATE: 89 BPM | BODY MASS INDEX: 29.99 KG/M2 | SYSTOLIC BLOOD PRESSURE: 158 MMHG | DIASTOLIC BLOOD PRESSURE: 90 MMHG | HEIGHT: 65 IN

## 2021-06-30 DIAGNOSIS — M75.42 IMPINGEMENT SYNDROME OF LEFT SHOULDER: ICD-10-CM

## 2021-06-30 PROCEDURE — 99204 OFFICE O/P NEW MOD 45 MIN: CPT | Mod: 25

## 2021-06-30 PROCEDURE — 99442: CPT | Mod: 95

## 2021-06-30 PROCEDURE — 20610 DRAIN/INJ JOINT/BURSA W/O US: CPT | Mod: LT

## 2021-06-30 NOTE — PHYSICAL EXAM
[Alert] : alert [Oriented to Person] : oriented to person [Oriented to Place] : oriented to place [Oriented to Time] : oriented to time [Calm] : calm [Stool Sample Taken] : No stool obtained  on rectal exam [de-identified] : no resp distress [FreeTextEntry1] : Physical exam findings via telephonic review with patient \par \par  [de-identified] : cooperative

## 2021-06-30 NOTE — ASSESSMENT
[Arterial/Venous Disease] : arterial/venous disease [Medication Management] : medication management [Foot care/Footwear] : foot care/footwear [FreeTextEntry1] : Impression le c/o from combo of arterial insuff  clinically improved and neurogenic c/o  from spinal stenosis\par \par \par Plan med conserv managemnt  exercise program prn, protective measures \par continue pletal 50 bid \par carotid duplex s/o stenosis and ben/pvr s/o art insuff 12mo jan 2022 then telehealth\par telehealth june /july 2021  to re eval le art insuff sx \par rto prn \par Telephonic visit  time duration 13 min\par \par

## 2021-06-30 NOTE — HISTORY OF PRESENT ILLNESS
[FreeTextEntry1] : pt is s/p spinal stenosis surgery pt is receiving rehab\par since this surgery pt c/o thi foot numbness \par pt c/o nocturnal leg and foot cramps 1-2x/week\par intensity  mod to severe and currently worsening \par worse w lying in bed \par onset sev years ago w recent worsening \par pt states that she is in wheelchair and transfers  and can only ambulate w walker w difficulty  [de-identified] : Pt states now rare   thi  leg and foot nocturnal cramps  1-2/month \par w improvement in intensity \par intensity  v mild   since last ov \par pt is currently on pletal 50 bid \par pt states no le wounds \par pt is f/u w neurologist for spinal stenosis c/o

## 2021-06-30 NOTE — REASON FOR VISIT
[Home] : at home, [unfilled] , at the time of the visit. [Medical Office: ([facility])___] : at the medical office located in  [Verbal consent obtained from patient] : the patient, [unfilled] [Other:____] : [unfilled] [FreeTextEntry1] : My legs bother me

## 2021-07-01 NOTE — HISTORY OF PRESENT ILLNESS
[de-identified] : Patient is here for left shoulder pain. She has been with left shoulder pain for years, worsening this winter without injury. She has to use a cane in the left arm for ambulation due to hx of lumbar issues.. She has not tried anything for the pain. Her pain is worse with movement. She is without numbness tinging or radicular pain. \par \par The patient's past medical history, past surgical history, medications and allergies were reviewed by me today and documented accordingly. In addition, the patient's family and social history, which were noncontributory to this visit, were reviewed also. Intake form was reviewed. The patient has no family history of arthritis.

## 2021-07-01 NOTE — PHYSICAL EXAM
[de-identified] : Constitutional: Well-nourished, well-developed, No acute distress\par Respiratory:  Good respiratory effort, no SOB\par Psychiatric: Pleasant and normal affect, alert and oriented x3\par Skin: Clean dry and intact B/L UE\par Musculoskeletal: normal except where as noted in regional exam\par \par \par Right Shoulder:\par APPEARANCE: no marked deformities, no swelling or malalignment\par POSITIVE TENDERNESS: none\par NONTENDER: supraspinatus, infraspinatus, teres minor, LH biceps, anterior and posterior capsule, AC joint\par ROM: full & painless, no scapular winging or dyskinesia present\par RESISTIVE TESTING: painless 5/5 resisted flex/ext, empty can/ER/IR, horizontal abd/add \par SPECIAL TESTS: neg Drop Arm, neg Empty Can, neg Townsend/Neers, neg Milton's, neg Speeds, neg Apprehension, neg cross arm adduction, neg apley's scratch test\par \par Left Shoulder:\par APPEARANCE: no marked deformities, no swelling or malalignment\par POSITIVE TENDERNESS: supraspinatus, long head biceps tendon\par NONTENDER:  infraspinatus, teres minor. biceps. anterior and posterior capsule. AC joint. \par ROM: full with mild painful arc past 60 degrees, no scapular winging or dyskinesia present\par RESISTIVE TESTING: MMT 4+/5 ER, Flexion and Empty can, 5/5 IR. painless 5/5 resisted ext, horizontal abd/add \par SPECIAL TESTS: + Townsend and Neers, mildly + cross arm adduction, + Speeds, neg Milton's, neg Drop Arm, neg Apprehension. neg apley's scratch test\par \par  Yes - the patient is able to be screened

## 2021-07-01 NOTE — PROCEDURE
[de-identified] : Injection: Left  Shoulder Subacromial Space.\par Indication: Impingement.\par \par A discussion was had with the patient regarding this procedure and all questions were answered. All risks, benefits and alternatives were discussed. These included but were not limited to bleeding, infection, and allergic reaction.  A timeout was done to ensure correct side and pt agreed to the procedure.   Alcohol was used to clean the skin, and betadine was used to sterilize and prep the area in the posterior aspect of the shoulder. Ethyl chloride spray was then used as a topical anesthetic. A 22-gauge 1.5" needle was used to inject 2cc of 0.25% bupivacaine without epinephrine and 1cc of 40mg/ml methylprednisolone into the subacromial space. A sterile bandage was then applied. The patient tolerated the procedure well and there were no complications.\par

## 2021-07-01 NOTE — DISCUSSION/SUMMARY
[de-identified] : Discussed findings of today's exam and possible causes of patient's pain.  Educated patient on their most probable diagnosis of chronic left shoulder pain with recent atraumatic exacerbation due to left subacromial impingement.  Reviewed possible courses of treatment, and we collaboratively decided best course of treatment at this time will include conservative management.  We discussed various treatment options as well as associated risk/benefits/alternatives and patient elected to proceed with cortisone injection today (see procedure note).  Informed the patient that the numbing medicine in today's injection will last for about 4-6 hours. The steroid that was injected will start to work in 1 to 2 days, peak at 1-2 weeks, and may last up to 1-2 months.  Patient started on a course of oral NSAIDs, prescription given for Mobic (We discussed all possible side effects of this medication).  Patient will be started on a course of physical therapy to restore normal range of motion and strength as tolerated.  Follow up as needed.  Patient appreciates and agrees with current plan.\par \par This note was generated using dragon medical dictation software.  A reasonable effort has been made for proofreading its contents, but typos may still remain.  If there are any questions or points of clarification needed please notify my office.\par

## 2021-07-08 ENCOUNTER — NON-APPOINTMENT (OUTPATIENT)
Age: 66
End: 2021-07-08

## 2021-07-08 ENCOUNTER — OUTPATIENT (OUTPATIENT)
Dept: OUTPATIENT SERVICES | Facility: HOSPITAL | Age: 66
LOS: 1 days | Discharge: HOME | End: 2021-07-08

## 2021-07-08 DIAGNOSIS — H50.9 UNSPECIFIED STRABISMUS: Chronic | ICD-10-CM

## 2021-07-08 DIAGNOSIS — C50.919 MALIGNANT NEOPLASM OF UNSPECIFIED SITE OF UNSPECIFIED FEMALE BREAST: ICD-10-CM

## 2021-07-08 DIAGNOSIS — C50.911 MALIGNANT NEOPLASM OF UNSPECIFIED SITE OF RIGHT FEMALE BREAST: Chronic | ICD-10-CM

## 2021-07-08 DIAGNOSIS — C43.72 MALIGNANT MELANOMA OF LEFT LOWER LIMB, INCLUDING HIP: Chronic | ICD-10-CM

## 2021-07-08 DIAGNOSIS — Z98.89 OTHER SPECIFIED POSTPROCEDURAL STATES: Chronic | ICD-10-CM

## 2021-07-08 DIAGNOSIS — C07 MALIGNANT NEOPLASM OF PAROTID GLAND: Chronic | ICD-10-CM

## 2021-07-12 ENCOUNTER — LABORATORY RESULT (OUTPATIENT)
Age: 66
End: 2021-07-12

## 2021-07-12 ENCOUNTER — APPOINTMENT (OUTPATIENT)
Dept: INFUSION THERAPY | Facility: HOSPITAL | Age: 66
End: 2021-07-12

## 2021-07-12 NOTE — ASSESSMENT
[FreeTextEntry1] : 65 y/o F h/o breast CA, melanoma, malignant neoplasm of the parotid gland, spinal stenosis here for f/u\par Exam is unchanged\par BP taken on left arm wnl\par PVD: add atorvastatin 10 mg, she will have repeat flp and lft's in 3 mos\par MSK: Referred to orhtop surgery\par HCM: Utd- she will be seeing Dr. Perez later this year for colonoscopy\par She received both Covid vaccines\par rto 6 mos

## 2021-07-12 NOTE — PHYSICAL EXAM
[No Acute Distress] : no acute distress [Well Nourished] : well nourished [Well Developed] : well developed [Well-Appearing] : well-appearing [Normal Sclera/Conjunctiva] : normal sclera/conjunctiva [EOMI] : extraocular movements intact [Normal Outer Ear/Nose] : the outer ears and nose were normal in appearance [Normal TMs] : both tympanic membranes were normal [No JVD] : no jugular venous distention [No Lymphadenopathy] : no lymphadenopathy [Supple] : supple [Thyroid Normal, No Nodules] : the thyroid was normal and there were no nodules present [No Respiratory Distress] : no respiratory distress  [No Accessory Muscle Use] : no accessory muscle use [Clear to Auscultation] : lungs were clear to auscultation bilaterally [Normal Rate] : normal rate  [Regular Rhythm] : with a regular rhythm [Normal S1, S2] : normal S1 and S2 [No Murmur] : no murmur heard [No Carotid Bruits] : no carotid bruits [No Abdominal Bruit] : a ~M bruit was not heard ~T in the abdomen [Pedal Pulses Present] : the pedal pulses are present [No Edema] : there was no peripheral edema [No Palpable Aorta] : no palpable aorta [Soft] : abdomen soft [Non Tender] : non-tender [Non-distended] : non-distended [No Masses] : no abdominal mass palpated [No HSM] : no HSM [Normal Bowel Sounds] : normal bowel sounds [Normal Supraclavicular Nodes] : no supraclavicular lymphadenopathy [Normal Posterior Cervical Nodes] : no posterior cervical lymphadenopathy [Normal Anterior Cervical Nodes] : no anterior cervical lymphadenopathy [No CVA Tenderness] : no CVA  tenderness [No Spinal Tenderness] : no spinal tenderness [No Joint Swelling] : no joint swelling [Grossly Normal Strength/Tone] : grossly normal strength/tone [No Rash] : no rash [Coordination Grossly Intact] : coordination grossly intact [Deep Tendon Reflexes (DTR)] : deep tendon reflexes were 2+ and symmetric [Normal Affect] : the affect was normal [Normal Insight/Judgement] : insight and judgment were intact

## 2021-07-12 NOTE — HEALTH RISK ASSESSMENT
[Good] : ~his/her~  mood as  good [No] : No [No falls in past year] : Patient reported no falls in the past year [0] : 2) Feeling down, depressed, or hopeless: Not at all (0) [Patient reported PAP Smear was normal] : Patient reported PAP Smear was normal [Patient reported bone density results were abnormal] : Patient reported bone density results were abnormal [Patient reported colonoscopy was normal] : Patient reported colonoscopy was normal [Transportation] : transportation [With Family] : lives with family [Retired] : retired [] :  [Fully functional (bathing, dressing, toileting, transferring, walking, feeding)] : Fully functional (bathing, dressing, toileting, transferring, walking, feeding) [Fully functional (using the telephone, shopping, preparing meals, housekeeping, doing laundry, using] : Fully functional and needs no help or supervision to perform IADLs (using the telephone, shopping, preparing meals, housekeeping, doing laundry, using transportation, managing medications and managing finances) [With Patient/Caregiver] : With Patient/Caregiver [] : No [de-identified] : waks with a cane [[id number]] : 0 [Change in mental status noted] : No change in mental status noted [Reports changes in hearing] : Reports no changes in hearing [Reports changes in vision] : Reports no changes in vision [MammogramComments] : b/l mastectomy [PapSmearDate] : 01/21 [BoneDensityDate] : 04/21 [BoneDensityComments] : osteopenia [ColonoscopyDate] : 10/11 [ColonoscopyComments] : has appt to see Dr. Perez [AdvancecareDate] : 06/17/21 [FreeTextEntry4] : Brother Kyle- [phone]\par Sister Susu: [phone]

## 2021-07-12 NOTE — HISTORY OF PRESENT ILLNESS
[de-identified] : 67 y/o F here for AWV\par Mother passed away early last year. Had complications of CAD\par PVD: Sees Dr. Snowden- had carotid dopplers in January- no significant stenosis\par Onc- saw oncology\par c/o left shoulder pain. Pain started several years ago. Thinks she had a torn ligament. This past winter, she shoveled a lot of snow, pain has been constant\par

## 2021-07-23 NOTE — H&P PST ADULT - MAMMOGRAM, RESULTS OF LAST, PROFILE
Received call from kvng at [facility] with Red Flag Complaint. Brief description of triage: balance and fatigue this is not new she has been to [facility] Dr.Shando alcantar and was treated for high BP in the ED and wanted to make the post hospitalization follow up she was put on medication at the hospital for kidney stone tamsilosin     Triage indicates for patient to stick with hospital DC paper work to be scheduled within the week she has followed up with MD's in relations to the medications and no symptoms today she wanted to triage     Care advice provided, patient verbalizes understanding; denies any other questions or concerns; instructed to call back for any new or worsening symptoms. Writer provided warm transfer to [place] at [facility] for appointment scheduling. Attention Provider: Thank you for allowing me to participate in the care of your patient. The patient was connected to triage in response to information provided to the [facility]. Please do not respond through this encounter as the response is not directed to a shared pool. Reason for Disposition   Caller has already spoken with the PCP (or office), and has no further questions    Answer Assessment - Initial Assessment Questions  1. NAME of MEDICATION: \"What medicine are you calling about? \"      tamsilosin started in hospital she started to feel bad and stopped her water pill and then she started feeling better an then bad again and decided to stop taking tamsilosin     2. QUESTION: Derickmark Ahumada is your question? \"      Her nephrologist told her to quit the hospitals medication and to get a scan at [facility] to see if she passed the stone     3. PRESCRIBING HCP: \"Who prescribed it? \" Reason: if prescribed by specialist, call should be referred to that group. Bereketss Pitcher in the hospital     4. SYMPTOMS: \"Do you have any symptoms? \"      She does not know the usual     5.  SEVERITY: If symptoms are present, ask \"Are they mild, moderate or severe? \"      Not sure what she calls sciatica she did have epidural and that was 6 weeks ago     6. PREGNANCY:  \"Is there any chance that you are pregnant? \" \"When was your last menstrual period? \"      Na    Answer Assessment - Initial Assessment Questions  1. ONSET: \"When did the pain begin? \"       Ongoing chronic     2. LOCATION: \"Where does it hurt? \" (upper, mid or lower back)      Lower back     3. SEVERITY: \"How bad is the pain? \"  (e.g., Scale 1-10; mild, moderate, or severe)    - MILD (1-3): doesn't interfere with normal activities     - MODERATE (4-7): interferes with normal activities or awakens from sleep     - SEVERE (8-10): excruciating pain, unable to do any normal activities       Had epidural 6 weeks ago it worked and now started up again today it is it is arthritis     4. PATTERN: \"Is the pain constant? \" (e.g., yes, no; constant, intermittent)       Has an appointment this Tuesday to help with this     5. RADIATION: \"Does the pain shoot into your legs or elsewhere? \"      Na    6. CAUSE:  \"What do you think is causing the back pain? \"       Arthritis and sciatica     7. BACK OVERUSE:  Iveth Prophet recent lifting of heavy objects, strenuous work or exercise? \"      Na     8. MEDICATIONS: \"What have you taken so far for the pain? \" (e.g., nothing, acetaminophen, NSAIDS)      Na    9. NEUROLOGIC SYMPTOMS: \"Do you have any weakness, numbness, or problems with bowel/bladder control? \"      na  10. OTHER SYMPTOMS: \"Do you have any other symptoms? \" (e.g., fever, abdominal pain, burning with urination, blood in urine)        na  11. PREGNANCY: \"Is there any chance you are pregnant? \" (e.g., yes, no; LMP)        na    Protocols used: NO CONTACT OR DUPLICATE CONTACT CALL-ADULT-[place], MEDICATION QUESTION CALL-ADULT-[place], BACK PAIN-ADULT-[place] h/o cancer both

## 2021-08-16 ENCOUNTER — OUTPATIENT (OUTPATIENT)
Dept: OUTPATIENT SERVICES | Facility: HOSPITAL | Age: 66
LOS: 1 days | Discharge: HOME | End: 2021-08-16

## 2021-08-16 DIAGNOSIS — Z98.89 OTHER SPECIFIED POSTPROCEDURAL STATES: Chronic | ICD-10-CM

## 2021-08-16 DIAGNOSIS — H50.9 UNSPECIFIED STRABISMUS: Chronic | ICD-10-CM

## 2021-08-16 DIAGNOSIS — C43.72 MALIGNANT MELANOMA OF LEFT LOWER LIMB, INCLUDING HIP: Chronic | ICD-10-CM

## 2021-08-16 DIAGNOSIS — C50.911 MALIGNANT NEOPLASM OF UNSPECIFIED SITE OF RIGHT FEMALE BREAST: Chronic | ICD-10-CM

## 2021-08-16 DIAGNOSIS — C07 MALIGNANT NEOPLASM OF PAROTID GLAND: Chronic | ICD-10-CM

## 2021-08-16 DIAGNOSIS — C50.919 MALIGNANT NEOPLASM OF UNSPECIFIED SITE OF UNSPECIFIED FEMALE BREAST: ICD-10-CM

## 2021-08-18 ENCOUNTER — RESULT REVIEW (OUTPATIENT)
Age: 66
End: 2021-08-18

## 2021-08-18 ENCOUNTER — LABORATORY RESULT (OUTPATIENT)
Age: 66
End: 2021-08-18

## 2021-08-18 ENCOUNTER — APPOINTMENT (OUTPATIENT)
Dept: INFUSION THERAPY | Facility: HOSPITAL | Age: 66
End: 2021-08-18

## 2021-08-18 LAB
BASOPHILS # BLD AUTO: 0.04 K/UL — SIGNIFICANT CHANGE UP (ref 0–0.2)
BASOPHILS NFR BLD AUTO: 0.7 % — SIGNIFICANT CHANGE UP (ref 0–2)
EOSINOPHIL # BLD AUTO: 0.16 K/UL — SIGNIFICANT CHANGE UP (ref 0–0.5)
EOSINOPHIL NFR BLD AUTO: 2.9 % — SIGNIFICANT CHANGE UP (ref 0–6)
HCT VFR BLD CALC: 36.5 % — SIGNIFICANT CHANGE UP (ref 34.5–45)
HGB BLD-MCNC: 11.9 G/DL — SIGNIFICANT CHANGE UP (ref 11.5–15.5)
IMM GRANULOCYTES NFR BLD AUTO: 0.6 % — SIGNIFICANT CHANGE UP (ref 0–1.5)
LYMPHOCYTES # BLD AUTO: 1.32 K/UL — SIGNIFICANT CHANGE UP (ref 1–3.3)
LYMPHOCYTES # BLD AUTO: 24.2 % — SIGNIFICANT CHANGE UP (ref 13–44)
MCHC RBC-ENTMCNC: 30.6 PG — SIGNIFICANT CHANGE UP (ref 27–34)
MCHC RBC-ENTMCNC: 32.6 G/DL — SIGNIFICANT CHANGE UP (ref 32–36)
MCV RBC AUTO: 93.8 FL — SIGNIFICANT CHANGE UP (ref 80–100)
MONOCYTES # BLD AUTO: 0.38 K/UL — SIGNIFICANT CHANGE UP (ref 0–0.9)
MONOCYTES NFR BLD AUTO: 7 % — SIGNIFICANT CHANGE UP (ref 2–14)
NEUTROPHILS # BLD AUTO: 3.52 K/UL — SIGNIFICANT CHANGE UP (ref 1.8–7.4)
NEUTROPHILS NFR BLD AUTO: 64.6 % — SIGNIFICANT CHANGE UP (ref 43–77)
NRBC # BLD: 0 /100 WBCS — SIGNIFICANT CHANGE UP (ref 0–0)
PLATELET # BLD AUTO: 175 K/UL — SIGNIFICANT CHANGE UP (ref 150–400)
RBC # BLD: 3.89 M/UL — SIGNIFICANT CHANGE UP (ref 3.8–5.2)
RBC # FLD: 12.5 % — SIGNIFICANT CHANGE UP (ref 10.3–14.5)
WBC # BLD: 5.45 K/UL — SIGNIFICANT CHANGE UP (ref 3.8–10.5)
WBC # FLD AUTO: 5.45 K/UL — SIGNIFICANT CHANGE UP (ref 3.8–10.5)

## 2021-08-19 ENCOUNTER — APPOINTMENT (OUTPATIENT)
Dept: INTERNAL MEDICINE | Facility: CLINIC | Age: 66
End: 2021-08-19
Payer: MEDICARE

## 2021-08-19 VITALS
SYSTOLIC BLOOD PRESSURE: 130 MMHG | WEIGHT: 181 LBS | DIASTOLIC BLOOD PRESSURE: 90 MMHG | OXYGEN SATURATION: 98 % | HEIGHT: 65 IN | HEART RATE: 92 BPM | BODY MASS INDEX: 30.16 KG/M2

## 2021-08-19 VITALS — DIASTOLIC BLOOD PRESSURE: 80 MMHG | SYSTOLIC BLOOD PRESSURE: 120 MMHG

## 2021-08-19 PROCEDURE — 99214 OFFICE O/P EST MOD 30 MIN: CPT

## 2021-08-19 RX ORDER — CHLORHEXIDINE GLUCONATE 4 %
250 LIQUID (ML) TOPICAL
Refills: 0 | Status: DISCONTINUED | COMMUNITY
Start: 2018-09-14 | End: 2021-08-19

## 2021-08-19 RX ORDER — OMEGA-3/DHA/EPA/FISH OIL 300-1000MG
1000 CAPSULE,DELAYED RELEASE (ENTERIC COATED) ORAL
Refills: 0 | Status: DISCONTINUED | COMMUNITY
Start: 2021-04-16 | End: 2021-08-19

## 2021-08-19 RX ORDER — OMEPRAZOLE 20 MG/1
20 TABLET, ORALLY DISINTEGRATING, DELAYED RELEASE ORAL
Refills: 0 | Status: DISCONTINUED | COMMUNITY
Start: 2021-06-17 | End: 2021-08-19

## 2021-08-19 RX ORDER — ALPHA LIPOIC ACID 100 MG
100 CAPSULE ORAL
Refills: 0 | Status: DISCONTINUED | COMMUNITY
End: 2021-08-19

## 2021-08-19 RX ORDER — B-COMPLEX WITH VITAMIN C
TABLET ORAL
Refills: 0 | Status: DISCONTINUED | COMMUNITY
Start: 2018-09-14 | End: 2021-08-19

## 2021-08-19 RX ORDER — CILOSTAZOL 50 MG/1
50 TABLET ORAL
Qty: 180 | Refills: 3 | Status: DISCONTINUED | COMMUNITY
Start: 2021-07-02 | End: 2021-08-19

## 2021-08-19 RX ORDER — CILOSTAZOL 50 MG/1
50 TABLET ORAL
Qty: 180 | Refills: 3 | Status: DISCONTINUED | COMMUNITY
Start: 2021-06-30 | End: 2021-08-19

## 2021-08-19 NOTE — ASSESSMENT
[FreeTextEntry1] : 65 y/o F here for f/u after hospitalization for HTN urgency\par HTN: Controlled, c/w regimen. Renewed \par rto for AWV\par

## 2021-08-19 NOTE — PHYSICAL EXAM
[No Acute Distress] : no acute distress [Well Nourished] : well nourished [No JVD] : no jugular venous distention [No Lymphadenopathy] : no lymphadenopathy [Supple] : supple [No Respiratory Distress] : no respiratory distress  [No Accessory Muscle Use] : no accessory muscle use [Clear to Auscultation] : lungs were clear to auscultation bilaterally [Normal Rate] : normal rate  [Regular Rhythm] : with a regular rhythm [Normal S1, S2] : normal S1 and S2 [No Edema] : there was no peripheral edema [Soft] : abdomen soft [Non Tender] : non-tender [No HSM] : no HSM [No CVA Tenderness] : no CVA  tenderness [No Spinal Tenderness] : no spinal tenderness

## 2021-08-19 NOTE — HISTORY OF PRESENT ILLNESS
[de-identified] : 67 y/o F here for f/u post discharge\par Developed dizziness, headache, legs felt weak.\par Went to [facility] on 7/2-7/7, found to have hypertensive urgency\par Had CT brain, MRI/MRA of the brain- no occlusions, or hemorrhages\par Had ECHO- LV EF 65%, nl LV function, mod TR\par Started on amlodipine 10 mg daily.\par Spent about a month at rehab for gait instability.\par Currently, feels great, only walking a little worse than before. Using a walker.\par

## 2021-08-25 ENCOUNTER — FORM ENCOUNTER (OUTPATIENT)
Age: 66
End: 2021-08-25

## 2021-08-26 ENCOUNTER — APPOINTMENT (OUTPATIENT)
Dept: OBGYN | Facility: CLINIC | Age: 66
End: 2021-08-26
Payer: MEDICARE

## 2021-08-26 PROCEDURE — 99214 OFFICE O/P EST MOD 30 MIN: CPT

## 2021-08-29 ENCOUNTER — FORM ENCOUNTER (OUTPATIENT)
Age: 66
End: 2021-08-29

## 2021-09-22 ENCOUNTER — APPOINTMENT (OUTPATIENT)
Dept: GASTROENTEROLOGY | Facility: CLINIC | Age: 66
End: 2021-09-22
Payer: MEDICARE

## 2021-09-22 VITALS
WEIGHT: 186 LBS | HEIGHT: 65 IN | OXYGEN SATURATION: 97 % | DIASTOLIC BLOOD PRESSURE: 75 MMHG | SYSTOLIC BLOOD PRESSURE: 120 MMHG | BODY MASS INDEX: 30.99 KG/M2 | HEART RATE: 87 BPM

## 2021-09-22 DIAGNOSIS — K58.2 MIXED IRRITABLE BOWEL SYNDROME: ICD-10-CM

## 2021-09-22 PROCEDURE — 99214 OFFICE O/P EST MOD 30 MIN: CPT

## 2021-09-22 NOTE — ASSESSMENT
[FreeTextEntry1] : This is a 66-year-old female on chronic PPI therapy for questionable GERD.  I explained to her that the symptoms she is experiencing consisting of abdominal discomfort, bloating with alternating diarrhea and constipation is not GERD.  This is due to irritable bowel syndrome and most probable lactose intolerance.  I recommend lactose-free diet.  I recommend cutting down on the omeprazole from 40 mg to 20 mg daily.  I explained to her possible complications of long-term PPI therapy including but not limited to bone loss.  I recommend a screening colonoscopy.  At the same time we will perform an upper endoscopy.  I explained to her the risks, alternatives and benefits to both upper endoscopy and colonoscopy.  Risk including but not limited to bleeding, perforation, infection and adverse medication reaction.  Questions were answered.  She stated understanding.  She does have an AICD.  She will need cardiac clearance prior to her planned procedure and a copy of recent interrogation of her AICD.  She will have presurgical testing prior to the planned procedure.  Both procedures will be performed at the hospital instead of outpatient endoscopy center.

## 2021-09-22 NOTE — HISTORY OF PRESENT ILLNESS
[FreeTextEntry1] : Lucrecia presents for follow-up visit.  She was last seen a year ago at which time she was able to come off omeprazole.  However she states that she is currently on omeprazole 40 mg daily because of bad heartburn.  However on further questioning her GI symptoms consist of an upset stomach with gas bloating and alternating diarrhea constipation with certain foods especially dairy products.  I explained to her that this is not heartburn and she should not be taking omeprazole for this reason.  However she insists that she has heartburn symptoms.  She denies dysphagia odynophagia.  She denies nausea or vomiting.  She admits to having dairy products including ice cream.  She admits that this upsets her stomach.  She has alternating diarrhea with constipation.  No rectal bleeding or melena.  No weight loss.  Reviewed recent blood work showing no evidence of anemia, normal electrolytes.  Normal TSH.  Her last colonoscopy was in October 2011.  She has not had an upper endoscopy.

## 2021-09-23 ENCOUNTER — APPOINTMENT (OUTPATIENT)
Dept: INTERNAL MEDICINE | Facility: CLINIC | Age: 66
End: 2021-09-23
Payer: MEDICARE

## 2021-09-23 VITALS
OXYGEN SATURATION: 96 % | DIASTOLIC BLOOD PRESSURE: 70 MMHG | HEIGHT: 65 IN | SYSTOLIC BLOOD PRESSURE: 110 MMHG | BODY MASS INDEX: 30.99 KG/M2 | HEART RATE: 90 BPM | WEIGHT: 186 LBS

## 2021-09-23 DIAGNOSIS — Z85.818 PERSONAL HISTORY OF MALIGNANT NEOPLASM OF OTHER SITES OF LIP, ORAL CAVITY, AND PHARYNX: ICD-10-CM

## 2021-09-23 DIAGNOSIS — Z86.008 PERSONAL HISTORY OF IN-SITU NEOPLASM OF OTHER SITE: ICD-10-CM

## 2021-09-23 DIAGNOSIS — Z86.718 PERSONAL HISTORY OF OTHER VENOUS THROMBOSIS AND EMBOLISM: ICD-10-CM

## 2021-09-23 PROCEDURE — G0008: CPT

## 2021-09-23 PROCEDURE — 99213 OFFICE O/P EST LOW 20 MIN: CPT | Mod: 25

## 2021-09-23 PROCEDURE — 90662 IIV NO PRSV INCREASED AG IM: CPT

## 2021-09-23 RX ORDER — OMEPRAZOLE 40 MG/1
40 CAPSULE, DELAYED RELEASE ORAL
Qty: 90 | Refills: 3 | Status: DISCONTINUED | COMMUNITY
Start: 2021-08-19 | End: 2021-09-23

## 2021-09-23 NOTE — HISTORY OF PRESENT ILLNESS
[FreeTextEntry1] : D and C [FreeTextEntry2] : 10/26/21 [FreeTextEntry3] : Dr. Mary Oreilly [FreeTextEntry4] : 67 y/o F here for medical clearance for D & C.\par Doing well. Soon will no longer be needing her LE brace.\par Noted vaginal spotting for the past 2 years. Gyn recommended D and C. \par Colonoscopy planned for January 2022\par Needs to see cardiology for assessment of AICD.  Place 2 years ago at [facility] . Sees Dr. Eron Vitale at [place].\par [employer] device. PST scheduled on 10/5

## 2021-09-23 NOTE — ASSESSMENT
[High Risk Surgery - Intraperitoneal, Intrathoracic or Supringuinal Vascular Procedures] : High Risk Surgery - Intraperitoneal, Intrathoracic or Supringuinal Vascular Procedures - No (0) [Ischemic Heart Disease] : Ischemic Heart Disease - No (0) [Congestive Heart Failure] : Congestive Heart Failure - No (0) [Prior Cerebrovascular Accident or TIA] : Prior Cerebrovascular Accident or TIA - Yes (1) [Creatinine >= 2mg/dL (1 Point)] : Creatinine >= 2mg/dL - No (0) [Insulin-dependent Diabetic (1 Point)] : Insulin-dependent Diabetic - No (0) [1] : 1 , RCRI Class: II, Risk of Post-Op Cardiac Complications: 6.0%, 95% CI for Risk Estimate: 4.9% - 7.4% [Patient Optimized for Surgery] : Patient optimized for surgery

## 2021-09-23 NOTE — PLAN
[FreeTextEntry1] : 65 y/o F h/o breast CA, melanoma,  parapharngyeal tumor, TIA, AICD here for preop clearance prior to D and C.\par Blood pressure well controlled.\par Hold ASA 7 days prior to procedure\par No contraindication to planned procedure pending PST testing.

## 2021-09-28 ENCOUNTER — FORM ENCOUNTER (OUTPATIENT)
Age: 66
End: 2021-09-28

## 2021-10-05 ENCOUNTER — OUTPATIENT (OUTPATIENT)
Dept: OUTPATIENT SERVICES | Facility: HOSPITAL | Age: 66
LOS: 1 days | End: 2021-10-05
Payer: MEDICARE

## 2021-10-05 VITALS
DIASTOLIC BLOOD PRESSURE: 87 MMHG | SYSTOLIC BLOOD PRESSURE: 144 MMHG | OXYGEN SATURATION: 96 % | WEIGHT: 181 LBS | HEART RATE: 88 BPM | TEMPERATURE: 96 F | RESPIRATION RATE: 18 BRPM | HEIGHT: 65 IN

## 2021-10-05 DIAGNOSIS — H50.9 UNSPECIFIED STRABISMUS: Chronic | ICD-10-CM

## 2021-10-05 DIAGNOSIS — Z98.890 OTHER SPECIFIED POSTPROCEDURAL STATES: Chronic | ICD-10-CM

## 2021-10-05 DIAGNOSIS — C07 MALIGNANT NEOPLASM OF PAROTID GLAND: Chronic | ICD-10-CM

## 2021-10-05 DIAGNOSIS — N84.2 POLYP OF VAGINA: ICD-10-CM

## 2021-10-05 DIAGNOSIS — Z98.89 OTHER SPECIFIED POSTPROCEDURAL STATES: Chronic | ICD-10-CM

## 2021-10-05 DIAGNOSIS — Z01.818 ENCOUNTER FOR OTHER PREPROCEDURAL EXAMINATION: ICD-10-CM

## 2021-10-05 DIAGNOSIS — C50.911 MALIGNANT NEOPLASM OF UNSPECIFIED SITE OF RIGHT FEMALE BREAST: Chronic | ICD-10-CM

## 2021-10-05 DIAGNOSIS — C43.72 MALIGNANT MELANOMA OF LEFT LOWER LIMB, INCLUDING HIP: Chronic | ICD-10-CM

## 2021-10-05 DIAGNOSIS — Z98.890 OTHER SPECIFIED POSTPROCEDURAL STATES: ICD-10-CM

## 2021-10-05 LAB
ANION GAP SERPL CALC-SCNC: 16 MMOL/L — SIGNIFICANT CHANGE UP (ref 5–17)
BLD GP AB SCN SERPL QL: NEGATIVE — SIGNIFICANT CHANGE UP
BUN SERPL-MCNC: 14 MG/DL — SIGNIFICANT CHANGE UP (ref 7–23)
CALCIUM SERPL-MCNC: 9.9 MG/DL — SIGNIFICANT CHANGE UP (ref 8.4–10.5)
CHLORIDE SERPL-SCNC: 105 MMOL/L — SIGNIFICANT CHANGE UP (ref 96–108)
CO2 SERPL-SCNC: 23 MMOL/L — SIGNIFICANT CHANGE UP (ref 22–31)
CREAT SERPL-MCNC: 0.83 MG/DL — SIGNIFICANT CHANGE UP (ref 0.5–1.3)
GLUCOSE SERPL-MCNC: 97 MG/DL — SIGNIFICANT CHANGE UP (ref 70–99)
HCT VFR BLD CALC: 38.7 % — SIGNIFICANT CHANGE UP (ref 34.5–45)
HGB BLD-MCNC: 12.4 G/DL — SIGNIFICANT CHANGE UP (ref 11.5–15.5)
MCHC RBC-ENTMCNC: 29.9 PG — SIGNIFICANT CHANGE UP (ref 27–34)
MCHC RBC-ENTMCNC: 32 GM/DL — SIGNIFICANT CHANGE UP (ref 32–36)
MCV RBC AUTO: 93.3 FL — SIGNIFICANT CHANGE UP (ref 80–100)
NRBC # BLD: 0 /100 WBCS — SIGNIFICANT CHANGE UP (ref 0–0)
PLATELET # BLD AUTO: 243 K/UL — SIGNIFICANT CHANGE UP (ref 150–400)
POTASSIUM SERPL-MCNC: 4.3 MMOL/L — SIGNIFICANT CHANGE UP (ref 3.5–5.3)
POTASSIUM SERPL-SCNC: 4.3 MMOL/L — SIGNIFICANT CHANGE UP (ref 3.5–5.3)
RBC # BLD: 4.15 M/UL — SIGNIFICANT CHANGE UP (ref 3.8–5.2)
RBC # FLD: 12.8 % — SIGNIFICANT CHANGE UP (ref 10.3–14.5)
RH IG SCN BLD-IMP: NEGATIVE — SIGNIFICANT CHANGE UP
SODIUM SERPL-SCNC: 144 MMOL/L — SIGNIFICANT CHANGE UP (ref 135–145)
WBC # BLD: 6.27 K/UL — SIGNIFICANT CHANGE UP (ref 3.8–10.5)
WBC # FLD AUTO: 6.27 K/UL — SIGNIFICANT CHANGE UP (ref 3.8–10.5)

## 2021-10-05 PROCEDURE — 80048 BASIC METABOLIC PNL TOTAL CA: CPT

## 2021-10-05 PROCEDURE — 86850 RBC ANTIBODY SCREEN: CPT

## 2021-10-05 PROCEDURE — 86900 BLOOD TYPING SEROLOGIC ABO: CPT

## 2021-10-05 PROCEDURE — 85027 COMPLETE CBC AUTOMATED: CPT

## 2021-10-05 PROCEDURE — 86901 BLOOD TYPING SEROLOGIC RH(D): CPT

## 2021-10-05 PROCEDURE — 36415 COLL VENOUS BLD VENIPUNCTURE: CPT

## 2021-10-05 PROCEDURE — G0463: CPT

## 2021-10-05 RX ORDER — SODIUM CHLORIDE 9 MG/ML
3 INJECTION INTRAMUSCULAR; INTRAVENOUS; SUBCUTANEOUS EVERY 8 HOURS
Refills: 0 | Status: DISCONTINUED | OUTPATIENT
Start: 2021-10-19 | End: 2021-11-02

## 2021-10-05 RX ORDER — LIDOCAINE HCL 20 MG/ML
0.2 VIAL (ML) INJECTION ONCE
Refills: 0 | Status: DISCONTINUED | OUTPATIENT
Start: 2021-10-19 | End: 2021-11-02

## 2021-10-05 NOTE — H&P PST ADULT - LAST CARDIAC ANGIOGRAM/IMAGING
Include Z78.9 (Other Specified Conditions Influencing Health Status) As An Associated Diagnosis?: No Consent: The patient's consent was obtained including but not limited to risks of crusting, scabbing, blistering, scarring, darker or lighter pigmentary change, recurrence, incomplete removal and infection. Post-Care Instructions: I reviewed with the patient in detail post-care instructions. Patient is to wear sunprotection, and avoid picking at any of the treated lesions. Pt may apply Vaseline to crusted or scabbing areas. Detail Level: Detailed Number Of Freeze-Thaw Cycles: 1 freeze-thaw cycle Medical Necessity Clause: This procedure was medically necessary because the lesions that were treated were: Medical Necessity Information: It is in your best interest to select a reason for this procedure from the list below. All of these items fulfill various CMS LCD requirements except the new and changing color options. denies

## 2021-10-05 NOTE — H&P PST ADULT - NS MD HP INPLANTS MED DEV
[facility] right chest, breast implants, lumbar screws/Automatic Implantable Cardioverter Defibrillator/Breast implant [facility] right chest, breast implants, lumbar screws and loop recorder/Breast implant/Vascular access device

## 2021-10-05 NOTE — H&P PST ADULT - NSICDXFAMILYHX_GEN_ALL_CORE_FT
FAMILY HISTORY:  Mother  Still living? Yes, Estimated age: 76  Family history of hypertension, Age at diagnosis: Age Unknown

## 2021-10-05 NOTE — H&P PST ADULT - NEGATIVE ENMT SYMPTOMS
no hearing difficulty/no ear pain/no tinnitus/no vertigo/no sinus symptoms/no nasal congestion/no nasal discharge/no nasal obstruction

## 2021-10-05 NOTE — H&P PST ADULT - NSICDXPASTSURGICALHX_GEN_ALL_CORE_FT
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

## 2021-10-05 NOTE — H&P PST ADULT - HISTORY OF PRESENT ILLNESS
67 y/o female presents to PST for a Hysteroscopy Bx Endometrium & Polyp w/ D&C on 10/19/21. PMH: HTN, HLD, GERD, Bilateral Breast CA (S/P B/l mastectomy & reconstruction, Chemo 2007 & Radiation 2014- right chest mediport still in place), excision of right parotid gland (with RT 1999), ?TIA (in 7/21 pt unsure, but states was placed on anti-hypertensive medication) and a Loop recorder ([place] SL placed for "syncope" as per pt, called office pt not seen since 2019; they will call pt and try to see her for upcoming sx, remote interrogation completed 9/29/21 to be sent to [facility] w/ last office note). Pt ambulates w/ walker.     65 y/o female presents to PST for a Hysteroscopy Bx Endometrium & Polyp w/ D&C on 10/19/21. PMH: HTN, HLD, GERD, Bilateral Breast CA (S/P B/l mastectomy & reconstruction, Chemo 2007 & Radiation 2014- right chest mediport still in place), excision of right parotid gland (with RT 1999), ?TIA (in 7/21 pt unsure, but states was placed on anti-hypertensive medication) and a Loop recorder (Axentra placed for "syncope" as per pt, called office pt not seen since 2019; they will call pt and try to see her for upcoming sx, remote interrogation completed 9/29/21 to be sent to [facility] w/ last office note). Pt ambulates w/ walker. Denies recent fevers, chills, cough, chest pain or SOB. Vaccinated for COVID w/ Pfizer Last dose 3/18/21. COVID pcr testing scheduled at [facility] on 10/16/21.

## 2021-10-05 NOTE — H&P PST ADULT - PROBLEM SELECTOR PLAN 1
Pt scheduled for procedure on 10/19/21. Surgical instructions provided to pt. COVID testing scheduled at [facility] on 10/16/21. Pt awaiting Cards clearance- gunjan has not been made yet.

## 2021-10-05 NOTE — H&P PST ADULT - NSICDXPASTMEDICALHX_GEN_ALL_CORE_FT
PAST MEDICAL HISTORY:  Anxiety     Breast cancer in situ, right Chemo 2007, Radiation 2014    Capsular contracture of breast implant, initial encounter right breast    Essential hypertension     Facial droop right side, due to parotid surgery    Gastroesophageal reflux disease without esophagitis     History of loop recorder Placed 8/2/2019    History of TIA (transient ischemic attack) 7/21    Hyperlipidemia     Lumbar radiculopathy     Melanoma in situ left foot    Parotid gland adenocarcinoma     Polyp of vagina     Syncope (pt w/ loop recorder)    TB (tuberculosis) 20 yrs ago treated for 1 year with medication     PAST MEDICAL HISTORY:  Anxiety     Breast cancer in situ, right Chemo 2007, Radiation 2014    Capsular contracture of breast implant, initial encounter right breast    Essential hypertension     Facial droop right side, due to parotid surgery    Gastroesophageal reflux disease without esophagitis     History of loop recorder Placed 8/2/2019    History of TIA (transient ischemic attack) ??7/21    Hyperlipidemia     Lumbar radiculopathy     Melanoma in situ left foot    Parotid gland adenocarcinoma     Polyp of vagina     Syncope (pt w/ loop recorder)    TB (tuberculosis) 20 yrs ago treated for 1 year with medication

## 2021-10-05 NOTE — H&P PST ADULT - PROBLEM SELECTOR PLAN 2
Called office; as per staff pt has not been seen since 2019 but had a remote interrogation on 9/29/2021 (staff to send to [facility] along w/ last office note). Staff to call pt to schedule gunjan prior to sx.

## 2021-10-05 NOTE — H&P PST ADULT - ATTENDING COMMENTS
pt seen and plan discussed. to proceed with surgery as planned. operative hysteroscopy, polypectomy, dilation and curettage.     bradley bowman

## 2021-10-12 ENCOUNTER — OUTPATIENT (OUTPATIENT)
Dept: OUTPATIENT SERVICES | Facility: HOSPITAL | Age: 66
LOS: 1 days | Discharge: HOME | End: 2021-10-12

## 2021-10-12 DIAGNOSIS — Z98.890 OTHER SPECIFIED POSTPROCEDURAL STATES: Chronic | ICD-10-CM

## 2021-10-12 DIAGNOSIS — C07 MALIGNANT NEOPLASM OF PAROTID GLAND: Chronic | ICD-10-CM

## 2021-10-12 DIAGNOSIS — Z98.89 OTHER SPECIFIED POSTPROCEDURAL STATES: Chronic | ICD-10-CM

## 2021-10-12 DIAGNOSIS — C43.72 MALIGNANT MELANOMA OF LEFT LOWER LIMB, INCLUDING HIP: Chronic | ICD-10-CM

## 2021-10-12 DIAGNOSIS — H50.9 UNSPECIFIED STRABISMUS: Chronic | ICD-10-CM

## 2021-10-12 DIAGNOSIS — C50.911 MALIGNANT NEOPLASM OF UNSPECIFIED SITE OF RIGHT FEMALE BREAST: Chronic | ICD-10-CM

## 2021-10-12 DIAGNOSIS — C50.919 MALIGNANT NEOPLASM OF UNSPECIFIED SITE OF UNSPECIFIED FEMALE BREAST: ICD-10-CM

## 2021-10-12 PROBLEM — E78.5 HYPERLIPIDEMIA, UNSPECIFIED: Chronic | Status: ACTIVE | Noted: 2021-10-05

## 2021-10-12 PROBLEM — N84.2 POLYP OF VAGINA: Chronic | Status: ACTIVE | Noted: 2021-10-05

## 2021-10-12 PROBLEM — Z86.73 PERSONAL HISTORY OF TRANSIENT ISCHEMIC ATTACK (TIA), AND CEREBRAL INFARCTION WITHOUT RESIDUAL DEFICITS: Chronic | Status: ACTIVE | Noted: 2021-10-05

## 2021-10-14 ENCOUNTER — FORM ENCOUNTER (OUTPATIENT)
Age: 66
End: 2021-10-14

## 2021-10-15 ENCOUNTER — APPOINTMENT (OUTPATIENT)
Dept: INFUSION THERAPY | Facility: HOSPITAL | Age: 66
End: 2021-10-15

## 2021-10-15 ENCOUNTER — APPOINTMENT (OUTPATIENT)
Dept: HEMATOLOGY ONCOLOGY | Facility: CLINIC | Age: 66
End: 2021-10-15
Payer: MEDICARE

## 2021-10-15 VITALS
OXYGEN SATURATION: 98 % | HEART RATE: 94 BPM | HEIGHT: 65 IN | DIASTOLIC BLOOD PRESSURE: 72 MMHG | BODY MASS INDEX: 30.78 KG/M2 | SYSTOLIC BLOOD PRESSURE: 118 MMHG | RESPIRATION RATE: 17 BRPM | WEIGHT: 184.75 LBS | TEMPERATURE: 96.8 F

## 2021-10-15 DIAGNOSIS — Z85.3 PERSONAL HISTORY OF MALIGNANT NEOPLASM OF BREAST: ICD-10-CM

## 2021-10-15 PROCEDURE — 99213 OFFICE O/P EST LOW 20 MIN: CPT

## 2021-10-16 ENCOUNTER — OUTPATIENT (OUTPATIENT)
Dept: OUTPATIENT SERVICES | Facility: HOSPITAL | Age: 66
LOS: 1 days | End: 2021-10-16
Payer: MEDICARE

## 2021-10-16 DIAGNOSIS — C50.911 MALIGNANT NEOPLASM OF UNSPECIFIED SITE OF RIGHT FEMALE BREAST: Chronic | ICD-10-CM

## 2021-10-16 DIAGNOSIS — C07 MALIGNANT NEOPLASM OF PAROTID GLAND: Chronic | ICD-10-CM

## 2021-10-16 DIAGNOSIS — C43.72 MALIGNANT MELANOMA OF LEFT LOWER LIMB, INCLUDING HIP: Chronic | ICD-10-CM

## 2021-10-16 DIAGNOSIS — Z11.52 ENCOUNTER FOR SCREENING FOR COVID-19: ICD-10-CM

## 2021-10-16 DIAGNOSIS — H50.9 UNSPECIFIED STRABISMUS: Chronic | ICD-10-CM

## 2021-10-16 DIAGNOSIS — Z98.89 OTHER SPECIFIED POSTPROCEDURAL STATES: Chronic | ICD-10-CM

## 2021-10-16 DIAGNOSIS — Z98.890 OTHER SPECIFIED POSTPROCEDURAL STATES: Chronic | ICD-10-CM

## 2021-10-16 PROBLEM — Z85.3 HISTORY OF MALIGNANT NEOPLASM OF BOTH BREASTS: Status: RESOLVED | Noted: 2019-04-05 | Resolved: 2021-06-17

## 2021-10-16 LAB — SARS-COV-2 RNA SPEC QL NAA+PROBE: SIGNIFICANT CHANGE UP

## 2021-10-16 PROCEDURE — C9803: CPT

## 2021-10-16 PROCEDURE — U0005: CPT

## 2021-10-16 PROCEDURE — U0003: CPT

## 2021-10-16 NOTE — PHYSICAL EXAM
[Restricted in physically strenuous activity but ambulatory and able to carry out work of a light or sedentary nature] : Status 1- Restricted in physically strenuous activity but ambulatory and able to carry out work of a light or sedentary nature, e.g., light house work, office work [Normal] : affect appropriate [de-identified] : facial droop chronic  [de-identified] : bilateral reconstruction with post surgical scar R axilla  [de-identified] : ambulating with rolling walker

## 2021-10-16 NOTE — ASSESSMENT
[FreeTextEntry1] : She is a 67 y/o F with recurrent lobular right breast cancer that recurred 3 years off endocrine therapy. She has been on letrozole since 1/2015. She continues to tolerate letrozole without any new signs or symptoms of breast cancer recurrence. She will have D and C done next week. If any abnormalities, she will have earlier follow up. Next follow up in 6 months but earlier if any new symptoms.\par

## 2021-10-16 NOTE — HISTORY OF PRESENT ILLNESS
[Disease: _____________________] : Disease: [unfilled] [de-identified] : She was initially diagnosed with breast cancer at age 52 with multifocal right breast cancer.  She had bilateral mastectomies and sentinel lymph node biopsy.  She had in the right breast a 1.5 cm infiltrating lobular carcinoma with negative sentinel lymph nodes.  The ER was 99%, NJ was 99%, Boj9enc was negative.  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  She received for Stage I breast cancer: AC followed by T dose dense from 6/2007 to 10/2007.  She was then placed on tamoxifen from 2007 and switched to anastrozole to complete 5 years of treatment on 12/1/2012.  On follow up in 2014, she had new palpable right breast finding and biopsy confirmed breast cancer.  On 12/3/14, she had right breast mass excision with axillary lymph node dissection.  The pathology showed invasive lobular carcinoma involving the dermis and superficial subcutaneous tissue along with 1 out of 15 lymph nodes involved by carcinoma.  The ER was 95%, NJ was 95%, Bzb5usg was CISH negative. She was started on letrozole. She had worsening back pain from spinal stenosis despite physical therapy. She had L4-5 posterior interbody fusion on 3/2017. She developed postoperative R calf DVT and was started initially on warfarin then Xarelto which was stopped after anticoagulation course and physical therapy to improve mobility.  [de-identified] : invasive lobular ER 95%, NV 95%, [id number] CISH negative [de-identified] : dd ACT 6/2007 to 10/2007\par tamoxifen then anastrozole 2007 to 12/2012\par Letrozole 1/2015 to present  [de-identified] : She continues to take letrozole without any new pain or intolerances. She denies any new chest wall changes or pain. She was in the hospital for hypertensive crisis and required rehab for 1 week. She has been walking with rolling walker. She has been having postmenopausal bleeding and will D&C this coming Tuesday. She was placed on amlodipine 10 mg for the BP.

## 2021-10-16 NOTE — REVIEW OF SYSTEMS
[Difficulty Walking] : difficulty walking [Negative] : Allergic/Immunologic [Confused] : no confusion [Dizziness] : no dizziness [Fainting] : no fainting

## 2021-10-18 ENCOUNTER — TRANSCRIPTION ENCOUNTER (OUTPATIENT)
Age: 66
End: 2021-10-18

## 2021-10-18 RX ORDER — SODIUM CHLORIDE 9 MG/ML
1000 INJECTION, SOLUTION INTRAVENOUS
Refills: 0 | Status: DISCONTINUED | OUTPATIENT
Start: 2021-10-19 | End: 2021-11-02

## 2021-10-18 RX ORDER — ONDANSETRON 8 MG/1
4 TABLET, FILM COATED ORAL ONCE
Refills: 0 | Status: DISCONTINUED | OUTPATIENT
Start: 2021-10-19 | End: 2021-11-02

## 2021-10-18 RX ORDER — IBUPROFEN 200 MG
800 TABLET ORAL ONCE
Refills: 0 | Status: DISCONTINUED | OUTPATIENT
Start: 2021-10-19 | End: 2021-11-02

## 2021-10-19 ENCOUNTER — RESULT REVIEW (OUTPATIENT)
Age: 66
End: 2021-10-19

## 2021-10-19 ENCOUNTER — OUTPATIENT (OUTPATIENT)
Dept: OUTPATIENT SERVICES | Facility: HOSPITAL | Age: 66
LOS: 1 days | End: 2021-10-19
Payer: MEDICARE

## 2021-10-19 ENCOUNTER — APPOINTMENT (OUTPATIENT)
Dept: OBGYN | Facility: HOSPITAL | Age: 66
End: 2021-10-19

## 2021-10-19 VITALS
HEART RATE: 88 BPM | DIASTOLIC BLOOD PRESSURE: 65 MMHG | OXYGEN SATURATION: 100 % | SYSTOLIC BLOOD PRESSURE: 101 MMHG | RESPIRATION RATE: 16 BRPM

## 2021-10-19 VITALS
DIASTOLIC BLOOD PRESSURE: 66 MMHG | RESPIRATION RATE: 15 BRPM | HEART RATE: 95 BPM | SYSTOLIC BLOOD PRESSURE: 105 MMHG | OXYGEN SATURATION: 99 %

## 2021-10-19 DIAGNOSIS — H50.9 UNSPECIFIED STRABISMUS: Chronic | ICD-10-CM

## 2021-10-19 DIAGNOSIS — C43.72 MALIGNANT MELANOMA OF LEFT LOWER LIMB, INCLUDING HIP: Chronic | ICD-10-CM

## 2021-10-19 DIAGNOSIS — N84.2 POLYP OF VAGINA: ICD-10-CM

## 2021-10-19 DIAGNOSIS — C07 MALIGNANT NEOPLASM OF PAROTID GLAND: Chronic | ICD-10-CM

## 2021-10-19 DIAGNOSIS — C50.911 MALIGNANT NEOPLASM OF UNSPECIFIED SITE OF RIGHT FEMALE BREAST: Chronic | ICD-10-CM

## 2021-10-19 DIAGNOSIS — Z98.89 OTHER SPECIFIED POSTPROCEDURAL STATES: Chronic | ICD-10-CM

## 2021-10-19 DIAGNOSIS — Z98.890 OTHER SPECIFIED POSTPROCEDURAL STATES: Chronic | ICD-10-CM

## 2021-10-19 LAB — RH IG SCN BLD-IMP: NEGATIVE — SIGNIFICANT CHANGE UP

## 2021-10-19 PROCEDURE — 58558 HYSTEROSCOPY BIOPSY: CPT

## 2021-10-19 PROCEDURE — 88305 TISSUE EXAM BY PATHOLOGIST: CPT | Mod: 26

## 2021-10-19 PROCEDURE — 88305 TISSUE EXAM BY PATHOLOGIST: CPT

## 2021-10-19 RX ORDER — SODIUM CHLORIDE 9 MG/ML
1000 INJECTION, SOLUTION INTRAVENOUS
Refills: 0 | Status: DISCONTINUED | OUTPATIENT
Start: 2021-10-19 | End: 2021-11-02

## 2021-10-19 NOTE — BRIEF OPERATIVE NOTE - NSICDXBRIEFPROCEDURE_GEN_ALL_CORE_FT
PROCEDURES:  Diagnostic hysteroscopy for uterine polyps 19-Oct-2021 09:37:32  Muriel Davies  D&C (dilatation and curettage, scraping of uterus) 19-Oct-2021 09:37:44  Muriel Davies

## 2021-10-19 NOTE — PRE-ANESTHESIA EVALUATION ADULT - NS MD HP INPLANTS MED DEV
[facility] right chest, breast implants, lumbar screws and loop recorder/Breast implant/Vascular access device

## 2021-10-19 NOTE — ASU PREOP CHECKLIST - INTERNAL PROSTHESES
right upper chest medport, lower back titanium screws, AICD/yes(specify) right upper chest medport, lower back titium screws/yes(specify)

## 2021-10-19 NOTE — ASU PATIENT PROFILE, ADULT - NSICDXPASTMEDICALHX_GEN_ALL_CORE_FT
PAST MEDICAL HISTORY:  Anxiety     Breast cancer in situ, right Chemo 2007, Radiation 2014    Capsular contracture of breast implant, initial encounter right breast    Essential hypertension     Facial droop right side, due to parotid surgery    Gastroesophageal reflux disease without esophagitis     History of loop recorder Placed 8/2/2019    History of TIA (transient ischemic attack) ??7/21    Hyperlipidemia     Lumbar radiculopathy     Melanoma in situ left foot    Parotid gland adenocarcinoma     Polyp of vagina     Syncope (pt w/ loop recorder)    TB (tuberculosis) 20 yrs ago treated for 1 year with medication

## 2021-10-19 NOTE — ASU DISCHARGE PLAN (ADULT/PEDIATRIC) - CARE PROVIDER_API CALL
Serena Oreilly)  Obstetrics and Gynecology  [address] [place] [address]  Phone: [phone]  Fax: [phone]  Established Patient  Follow Up Time: 2 weeks

## 2021-10-19 NOTE — BRIEF OPERATIVE NOTE - OPERATION/FINDINGS
Exam under anesthesia revealed a small postmenopausal uterus, anteverted. Prominent cystocele. Vagina and cervix appeared normal.   Hysteroscopy showed: normal cervical tissue, normal endometrial tissue. Questionable polyp vs fibroid vs endometrial tissue on anterior wall, sessile with ~ 3mm base, 1mm high. Bilateral ostia visualized and appear normal.

## 2021-10-26 DIAGNOSIS — Z86.11 PERSONAL HISTORY OF TUBERCULOSIS: ICD-10-CM

## 2021-10-26 DIAGNOSIS — Z78.9 OTHER SPECIFIED HEALTH STATUS: ICD-10-CM

## 2021-10-26 DIAGNOSIS — N85.00 ENDOMETRIAL HYPERPLASIA, UNSPECIFIED: ICD-10-CM

## 2021-10-26 LAB — SURGICAL PATHOLOGY STUDY: SIGNIFICANT CHANGE UP

## 2021-11-08 ENCOUNTER — APPOINTMENT (OUTPATIENT)
Dept: OBGYN | Facility: CLINIC | Age: 66
End: 2021-11-08
Payer: MEDICARE

## 2021-11-08 VITALS
HEIGHT: 65 IN | BODY MASS INDEX: 30.66 KG/M2 | SYSTOLIC BLOOD PRESSURE: 120 MMHG | DIASTOLIC BLOOD PRESSURE: 70 MMHG | WEIGHT: 184 LBS

## 2021-11-08 DIAGNOSIS — N84.0 POLYP OF CORPUS UTERI: ICD-10-CM

## 2021-11-08 PROCEDURE — 99213 OFFICE O/P EST LOW 20 MIN: CPT

## 2021-11-08 NOTE — REASON FOR VISIT
[Post Op Day: ___] : Post-Op Day:  #[unfilled] [de-identified] :  61397 OPERATIVE HYSTEROSCOPY\par 06028 D&C NON OBSTETRICAL \par

## 2021-11-08 NOTE — HISTORY OF PRESENT ILLNESS
[Pain is well-controlled] : pain is well-controlled [de-identified] : doing well. no vb after surgery.

## 2021-11-12 ENCOUNTER — NON-APPOINTMENT (OUTPATIENT)
Age: 66
End: 2021-11-12

## 2021-12-06 ENCOUNTER — APPOINTMENT (OUTPATIENT)
Dept: SURGICAL ONCOLOGY | Facility: CLINIC | Age: 66
End: 2021-12-06
Payer: MEDICARE

## 2021-12-06 VITALS
WEIGHT: 184 LBS | DIASTOLIC BLOOD PRESSURE: 84 MMHG | SYSTOLIC BLOOD PRESSURE: 139 MMHG | HEIGHT: 65 IN | BODY MASS INDEX: 30.66 KG/M2 | RESPIRATION RATE: 16 BRPM | TEMPERATURE: 99.1 F | HEART RATE: 82 BPM | OXYGEN SATURATION: 95 %

## 2021-12-06 DIAGNOSIS — Z85.820 PERSONAL HISTORY OF MALIGNANT MELANOMA OF SKIN: ICD-10-CM

## 2021-12-06 PROCEDURE — 99214 OFFICE O/P EST MOD 30 MIN: CPT

## 2021-12-06 NOTE — ASSESSMENT
[FreeTextEntry1] : Continues to do well.\par \par I have asked to return for reexamination in a year, sooner if needed.\par \par We do not require any imaging presently.

## 2021-12-06 NOTE — HISTORY OF PRESENT ILLNESS
[de-identified] : 66 year-old lady who in July 2008 had wide excision of a melanoma in situ of the LEFT FOOT, on the plantar aspect, with negative margins, and reconstruction by Dr. Paul Stokes.\par \par No relatives with melanoma.\par \par \par July 2021 she had a TIA manifest with problems with balance.\par She was hospitalized at [facility] and diagnosed with a hypertensive crisis which required 1 week of treatment, prior to discharge.\par \par She had spinal stenosis surgery, 2017, by Dr. Jamie Mullins at [facility].\par ***Her postoperative course was complicated by a DVT***\par \par Hospn in April 2016 @ [facility] with severe viral infection.\par \par \par She had a right parotidectomy for PAROTID CANCER in 2004 by Dr. Jamie Taveras, followed by radiation therapy.\par She follows up with Dr. James Tompkins for a left parapharyngeal mass.\par \par \par In 1977 she had Lumpectomy for a MALIGNANT PHYLLOIDES tumor of the RIGHT breast.\par A left breast biopsy in 2000 was benign.\par In April 2007 she had a right mastectomy for BREAST CANCER, and a prophylactic left mastectomy, by Dr. Susan Palleschi, and reconstruction by Dr. Roy.\par She had a follow-up with breast surgery in 2021\par In December 2014 she had surgery for recurrent right breast cancer, followed by radiation therapy by Dr.Beatrice Pope.\par Her hematologist was Dr. Metz, now Dr. Tito [facility] at the [facility]\par Chemotherapy in 2007.\par Tamoxifen and anastrozole, [phone].\par Letrozole from January 2015 to presently, September 2019 visit she was doing well.\par \par \par Her dermatologist is Dr. Caron RUIZ, November 2021 was unremarkable.\par \par \par Her internist is Dr. Terrie JEROME.\par \par She does not have a pacemaker or defibrillator.\par +LOOP-RECORDER, , After a syncopal episode, Summer 2019, \par Cardiology is at [facility]\par \par She takes baby aspirin daily.\par \par July 2021 she had a TIA manifest with problems with balance.\par She was hospitalized at [facility] and diagnosed with a hypertensive crisis which required 1 week of treatment, prior to discharge.\par \par Medications:\par Omeprazole and Zantac for GERD.\par \par Letrozole as adjuvant treatment for her recurrent breast cancer.\par \par For chronic pain management she is on gabapentin for neuropathy; \par Neurology: Dr. Rajwinder FLETCHER\par \par She had spinal stenosis surgery, 2017, by Dr. Jamie Mullins at [facility].\par ***Her postoperative course was complicated by a DVT***\par \par She has seen Dr. Luis Snowden from vascular surgery for arterial lower extremity insufficiency.\par She's been treated with exercise, and cilostazol\par \par \par Her gynecologist is Dr. Rosalba GUTIERREZ\par December 2018 Pap smear was unremarkable.\par February 2021 visit was unremarkable.\par Was referred to Dr. Serena Oreilly for consideration of hysterectomy for fibroids, no operation recommended presently.\par September 2021 D&C was normal\par \par \par Eye examination, October 2020 was ok, our ophthalmology department at [address] [place] [address].\par \par \par She has had bilateral mastectomies.\par \par \par Colonoscopy will be repeated in January 2022 by Dr. Sarah Kuo, appointment scheduled

## 2021-12-06 NOTE — REVIEW OF SYSTEMS
[Negative] : Endocrine [FreeTextEntry4] : Parotid cancer [FreeTextEntry5] : Irregular heart rate [FreeTextEntry8] : Reflux [de-identified] : Melanoma [de-identified] : TIA [FreeTextEntry1] : History of breast cancer

## 2021-12-06 NOTE — REASON FOR VISIT
[Follow-Up Visit] : a follow-up visit for [Other: _____] : [unfilled] [FreeTextEntry2] : Left foot melanoma in situ, plantar

## 2021-12-06 NOTE — PHYSICAL EXAM
[Normal] : supple, no neck mass and thyroid not enlarged [Normal Neck Lymph Nodes] : normal neck lymph nodes  [Normal Supraclavicular Lymph Nodes] : normal supraclavicular lymph nodes [Normal Groin Lymph Nodes] : normal groin lymph nodes [Normal Axillary Lymph Nodes] : normal axillary lymph nodes [Normal] : full range of motion and no deformities appreciated [de-identified] : Below

## 2021-12-10 ENCOUNTER — OUTPATIENT (OUTPATIENT)
Dept: OUTPATIENT SERVICES | Facility: HOSPITAL | Age: 66
LOS: 1 days | Discharge: HOME | End: 2021-12-10

## 2021-12-10 DIAGNOSIS — Z98.89 OTHER SPECIFIED POSTPROCEDURAL STATES: Chronic | ICD-10-CM

## 2021-12-10 DIAGNOSIS — H50.9 UNSPECIFIED STRABISMUS: Chronic | ICD-10-CM

## 2021-12-10 DIAGNOSIS — C43.72 MALIGNANT MELANOMA OF LEFT LOWER LIMB, INCLUDING HIP: Chronic | ICD-10-CM

## 2021-12-10 DIAGNOSIS — C50.911 MALIGNANT NEOPLASM OF UNSPECIFIED SITE OF RIGHT FEMALE BREAST: Chronic | ICD-10-CM

## 2021-12-10 DIAGNOSIS — Z98.890 OTHER SPECIFIED POSTPROCEDURAL STATES: Chronic | ICD-10-CM

## 2021-12-10 DIAGNOSIS — C07 MALIGNANT NEOPLASM OF PAROTID GLAND: Chronic | ICD-10-CM

## 2021-12-10 DIAGNOSIS — C50.919 MALIGNANT NEOPLASM OF UNSPECIFIED SITE OF UNSPECIFIED FEMALE BREAST: ICD-10-CM

## 2021-12-20 ENCOUNTER — NON-APPOINTMENT (OUTPATIENT)
Age: 66
End: 2021-12-20

## 2021-12-21 ENCOUNTER — APPOINTMENT (OUTPATIENT)
Dept: VASCULAR SURGERY | Facility: CLINIC | Age: 66
End: 2021-12-21
Payer: MEDICARE

## 2021-12-21 VITALS
TEMPERATURE: 97.2 F | HEIGHT: 65 IN | BODY MASS INDEX: 30.66 KG/M2 | SYSTOLIC BLOOD PRESSURE: 142 MMHG | HEART RATE: 80 BPM | DIASTOLIC BLOOD PRESSURE: 81 MMHG | WEIGHT: 184 LBS

## 2021-12-21 DIAGNOSIS — M79.604 PAIN IN RIGHT LEG: ICD-10-CM

## 2021-12-21 DIAGNOSIS — M79.605 PAIN IN RIGHT LEG: ICD-10-CM

## 2021-12-21 PROCEDURE — 93923 UPR/LXTR ART STDY 3+ LVLS: CPT

## 2021-12-21 PROCEDURE — 99214 OFFICE O/P EST MOD 30 MIN: CPT

## 2021-12-21 NOTE — DATA REVIEWED
[FreeTextEntry1] : 6/20/2018 AID patent AA and damien iliac arterial systems  no sig stenosis\par \par 6/20/2018 BELEM/PVR mild to mod infragenic art insuff w vessel calcification  Rt BELEM 1.21 lt BELEM 1.17\par \par 7/2/2019 BELEM/PVR mild to mod infragenic art insuff w vessel calcification  Rt BELEM 1.19 lt BELEM 1.19\par \par 12/22/2020 BELEM/PVR mild to mod infragenic art insuff w vessel calcification  Rt BELEM 1.28 lt BELEM 1.23\par \par 1/21/2021 Carotid Duplex  Rt ICA  less 50% stenosis (179/66) by velocity criteria\par                          Lt  ICA  less 50% stenosis (109/54) by velocity criteria\par                          Damien Ant Vertebral Arterial Flow \par \par 12/21/2021  BEELM/PVR mild to mod infragenic art insuff w vessel calcification  Rt BELEM 1.27 lt BELEM 1.24\par \par \par

## 2021-12-21 NOTE — PHYSICAL EXAM
[Alert] : alert [Oriented to Person] : oriented to person [Oriented to Place] : oriented to place [Oriented to Time] : oriented to time [Calm] : calm [JVD] : no jugular venous distention  [Normal Breath Sounds] : Normal breath sounds [2+] : left 2+ [Right Carotid Bruit] : no bruit heard over the right carotid [Left Carotid Bruit] : no bruit heard over the left carotid [1+] : left 1+ [Ankle Swelling (On Exam)] : present [Ankle Swelling Bilaterally] : bilaterally  [Varicose Veins Of Lower Extremities] : bilaterally [] : bilaterally [Ankle Swelling On The Right] : mild [Abdomen Masses] : No abdominal masses [No HSM] : no hepatosplenomegaly [Tender] : was nontender [Stool Sample Taken] : No stool obtained  on rectal exam [No Rash or Lesion] : No rash or lesion [de-identified] : nad [de-identified] : wnl [de-identified] : no resp distress [FreeTextEntry1] : mild to mod art insuff w mod trophic skin changes \par mild venous insufff w mild  thi le edema and mild st dermatitis from knee distally \par multiple small v veins and spider v thi shins and ankles 1-2 mm  [de-identified] : in wheelchair  [de-identified] : cn 2-12 thi grossly intact  [de-identified] : cooperative

## 2021-12-21 NOTE — HISTORY OF PRESENT ILLNESS
[FreeTextEntry1] : pt is s/p spinal stenosis surgery pt is receiving rehab\par since this surgery pt c/o thi foot numbness \par pt c/o nocturnal leg and foot cramps 1-2x/week\par intensity  mod to severe and currently worsening \par worse w lying in bed \par onset sev years ago w recent worsening \par pt states that she is in wheelchair and transfers  and can only ambulate w walker w difficulty  [de-identified] : Pt states now rare   thi  leg and foot nocturnal cramps  1-2/month \par w improvement in intensity \par intensity  v mild   since last ov \par pt is currently on pletal 50 bid \par pt states no le wounds \par pt  states recent f/u w neurologist \par pt also c/o thi thigh and leg discomfort at night when lying down

## 2021-12-21 NOTE — ASSESSMENT
[Arterial/Venous Disease] : arterial/venous disease [Medication Management] : medication management [Foot care/Footwear] : foot care/footwear [FreeTextEntry1] : Impression le c/o from combo of arterial insuff  clinically improved and neurogenic c/o  from spinal stenosis\par \par \par Plan med conserv managemnt  exercise program prn, protective measures \par continue pletal 50 bid \par carotid duplex s/o stenosis and ben/pvr s/o art insuff 12mo jan 2022 then telehealth\par advised pt to f/u w neurologist to see if additional neuro w/u is needed  or pain management \par pt agrees to do so \par d/w pt trial pletal 50 bid  to increase to 100 bid  however from a vasc surg standpoint\par i believe that the majority of her current le sx are neurogenic in origin\par pt wants to hold off\par ov  w ben/pvr s/o art insuff 12 mo dec 2022 \par \par \par \par

## 2022-01-07 ENCOUNTER — APPOINTMENT (OUTPATIENT)
Dept: INFUSION THERAPY | Facility: HOSPITAL | Age: 67
End: 2022-01-07

## 2022-01-09 DIAGNOSIS — Z51.11 ENCOUNTER FOR ANTINEOPLASTIC CHEMOTHERAPY: ICD-10-CM

## 2022-01-09 DIAGNOSIS — R11.2 NAUSEA WITH VOMITING, UNSPECIFIED: ICD-10-CM

## 2022-01-18 ENCOUNTER — APPOINTMENT (OUTPATIENT)
Dept: VASCULAR SURGERY | Facility: CLINIC | Age: 67
End: 2022-01-18
Payer: MEDICARE

## 2022-01-18 DIAGNOSIS — I65.23 OCCLUSION AND STENOSIS OF BILATERAL CAROTID ARTERIES: ICD-10-CM

## 2022-01-18 PROCEDURE — 93880 EXTRACRANIAL BILAT STUDY: CPT

## 2022-01-19 ENCOUNTER — APPOINTMENT (OUTPATIENT)
Dept: VASCULAR SURGERY | Facility: CLINIC | Age: 67
End: 2022-01-19
Payer: MEDICARE

## 2022-01-19 PROBLEM — I65.23 CAROTID STENOSIS, ASYMPTOMATIC, BILATERAL: Status: ACTIVE | Noted: 2021-01-27

## 2022-01-19 PROCEDURE — 99213 OFFICE O/P EST LOW 20 MIN: CPT

## 2022-01-19 NOTE — PHYSICAL EXAM
[Normal Breath Sounds] : Normal breath sounds [No Rash or Lesion] : No rash or lesion [Alert] : alert [Oriented to Person] : oriented to person [Oriented to Place] : oriented to place [Oriented to Time] : oriented to time [Calm] : calm [JVD] : no jugular venous distention  [Stool Sample Taken] : No stool obtained  on rectal exam [de-identified] : nad [de-identified] : wnl [de-identified] : no resp distress [FreeTextEntry1] : Physical exam findings via telephonic review with patient \par \par  [de-identified] : cooperative

## 2022-01-19 NOTE — DATA REVIEWED
[FreeTextEntry1] : 6/20/2018 AID patent AA and damien iliac arterial systems  no sig stenosis\par \par 6/20/2018 BELEM/PVR mild to mod infragenic art insuff w vessel calcification  Rt BELEM 1.21 lt BELEM 1.17\par \par 7/2/2019 BELEM/PVR mild to mod infragenic art insuff w vessel calcification  Rt BELEM 1.19 lt BELEM 1.19\par \par 12/22/2020 BELEM/PVR mild to mod infragenic art insuff w vessel calcification  Rt BELEM 1.28 lt BELEM 1.23\par \par 1/21/2021 Carotid Duplex  Rt ICA  less 50% stenosis (179/66) by velocity criteria\par                          Lt  ICA  less 50% stenosis (109/54) by velocity criteria\par                          Damien Ant Vertebral Arterial Flow \par \par 12/21/2021  BELEM/PVR mild to mod infragenic art insuff w vessel calcification  Rt BELEM 1.27 lt BELEM 1.24\par \par 1/18/2022 Carotid Duplex  Rt ICA  less 50% stenosis \par                          Lt  ICA  less 50% stenosis \par                          Damien Ant Vertebral Arterial Flow \par \par \par

## 2022-01-19 NOTE — REASON FOR VISIT
[Home] : at home, [unfilled] , at the time of the visit. [Medical Office: ([facility])___] : at the medical office located in  [Other:____] : [unfilled] [Verbal consent obtained from patient] : the patient, [unfilled] [FreeTextEntry1] : My legs bother me still

## 2022-01-19 NOTE — HISTORY OF PRESENT ILLNESS
[FreeTextEntry1] : pt is s/p spinal stenosis surgery pt is receiving rehab\par since this surgery pt c/o thi foot numbness \par pt c/o nocturnal leg and foot cramps 1-2x/week\par intensity  mod to severe and currently worsening \par worse w lying in bed \par onset sev years ago w recent worsening \par pt states that she is in wheelchair and transfers  and can only ambulate w walker w difficulty  [de-identified] : Pt states now rare   thi  leg and foot nocturnal cramps but remain \par w improvement in intensity \par intensity  v mild   since last ov \par pt is currently on pletal 50 bid \par pt states no le wounds \par

## 2022-01-19 NOTE — ASSESSMENT
[Arterial/Venous Disease] : arterial/venous disease [Medication Management] : medication management [Foot care/Footwear] : foot care/footwear [FreeTextEntry1] : Impression le c/o from combo of arterial insuff  clinically some art insuff sx  improved and neurogenic c/o  from spinal stenosis remain \par \par \par Plan med conserv managemnt  exercise program prn, protective measures \par continue pletal 50 bid \par advised pt to f/u w neurologist to see if additional neuro w/u is needed  or pain management \par pt agrees to do so \par d/w pt trial pletal 50 bid  to increase to 100 bid  however from a vasc surg standpoint\par i believe that the majority of her current le sx are neurogenic in origin\par will hold off on pletal  dose chnages for now \par ov  w ben/pvr s/o art insuff 12 mo dec 2022 \par carotid duplex s/o stenosis  12mo feb 2023 then telehealth\par telephonic visit in 4-6 weeks to re eval le c/o \par Telephonic visit  time duration 6  min\par \par \par \par \par \par

## 2022-02-03 ENCOUNTER — APPOINTMENT (OUTPATIENT)
Dept: RADIATION ONCOLOGY | Facility: CLINIC | Age: 67
End: 2022-02-03
Payer: MEDICARE

## 2022-02-03 VITALS
RESPIRATION RATE: 16 BRPM | WEIGHT: 185 LBS | BODY MASS INDEX: 30.82 KG/M2 | HEART RATE: 82 BPM | OXYGEN SATURATION: 97 % | SYSTOLIC BLOOD PRESSURE: 115 MMHG | DIASTOLIC BLOOD PRESSURE: 71 MMHG | HEIGHT: 65 IN

## 2022-02-03 PROCEDURE — 99212 OFFICE O/P EST SF 10 MIN: CPT

## 2022-02-03 NOTE — REVIEW OF SYSTEMS
[Fatigue: Grade 0] : Fatigue: Grade 0 [Breast Pain: Grade 0] : Breast Pain: Grade 0 [Pruritus: Grade 0] : Pruritus: Grade 0 [Dermatitis Radiation: Grade 0] : Dermatitis Radiation: Grade 0

## 2022-02-10 ENCOUNTER — APPOINTMENT (OUTPATIENT)
Dept: MAMMOGRAPHY | Facility: IMAGING CENTER | Age: 67
End: 2022-02-10

## 2022-02-10 ENCOUNTER — APPOINTMENT (OUTPATIENT)
Dept: ULTRASOUND IMAGING | Facility: IMAGING CENTER | Age: 67
End: 2022-02-10

## 2022-02-15 NOTE — HISTORY OF PRESENT ILLNESS
[FreeTextEntry1] : Ms. Ocampo is a 66 year old female with recurrent lobular carcinoma of the right breast s/p resection and lymph node sampling followed by RT.\par \par Of note, \par 1977 - lumpectomy for malignant phyllodes tumor of the right breast\par 2004 - right parotidectomy for parotid cancer with Dr. Taveras, followed by radiation therapy. \par 2007 - right mastectomy for breast CA with prophylactic left mastectomy; chemotherapy. Tamoxifen and anastrozole.\par 2008 (July) - wide excision of melanoma in situ of the left foot on the plantar aspect, negative margins, & reconstruction w/ Dr. Stokes.\par 2014 (December) surgery for recurrent right breast CA, followed by RT\par  s/p right wide excision, right axillary LN dissection. Pathology revealed invasive lobular carcinoma with 1/15 positive lymph nodes. ER/TX positive, [id number] negative. \par She was then treated with high tangents to right reconstructed chest wall to a total dose of 5,000 cGy in 25 fractions with a boost to the tumor bed of 1,000 cGy in 2 fractions. She completed treatment on 5/15/15. \par \par Currently on letrozole since 1/2015.\par As of January 2021 - was started on Pletal for b/l foot numbness, leg and foot cramping. Follows with Dr. Norton. \par 8/18/21 - CEA: 1.2 ng/mL ; : 8.2 U/mL\par \par She presents today for routine follow-up. Today she is feeling well. No skin changes. Denies any pain.

## 2022-02-15 NOTE — PHYSICAL EXAM
[Normal] : normal scars, no masses, no nipple discharge [de-identified] : s/p bilateral mastectomy and recon, nopalpable lesions

## 2022-03-02 ENCOUNTER — OUTPATIENT (OUTPATIENT)
Dept: OUTPATIENT SERVICES | Facility: HOSPITAL | Age: 67
LOS: 1 days | Discharge: HOME | End: 2022-03-02

## 2022-03-02 DIAGNOSIS — C50.919 MALIGNANT NEOPLASM OF UNSPECIFIED SITE OF UNSPECIFIED FEMALE BREAST: ICD-10-CM

## 2022-03-02 DIAGNOSIS — C50.911 MALIGNANT NEOPLASM OF UNSPECIFIED SITE OF RIGHT FEMALE BREAST: Chronic | ICD-10-CM

## 2022-03-02 DIAGNOSIS — H50.9 UNSPECIFIED STRABISMUS: Chronic | ICD-10-CM

## 2022-03-02 DIAGNOSIS — C43.72 MALIGNANT MELANOMA OF LEFT LOWER LIMB, INCLUDING HIP: Chronic | ICD-10-CM

## 2022-03-02 DIAGNOSIS — Z98.89 OTHER SPECIFIED POSTPROCEDURAL STATES: Chronic | ICD-10-CM

## 2022-03-02 DIAGNOSIS — Z98.890 OTHER SPECIFIED POSTPROCEDURAL STATES: Chronic | ICD-10-CM

## 2022-03-02 DIAGNOSIS — C07 MALIGNANT NEOPLASM OF PAROTID GLAND: Chronic | ICD-10-CM

## 2022-03-04 ENCOUNTER — APPOINTMENT (OUTPATIENT)
Dept: INFUSION THERAPY | Facility: HOSPITAL | Age: 67
End: 2022-03-04

## 2022-03-07 DIAGNOSIS — Z51.11 ENCOUNTER FOR ANTINEOPLASTIC CHEMOTHERAPY: ICD-10-CM

## 2022-03-07 DIAGNOSIS — R11.2 NAUSEA WITH VOMITING, UNSPECIFIED: ICD-10-CM

## 2022-03-16 ENCOUNTER — APPOINTMENT (OUTPATIENT)
Dept: VASCULAR SURGERY | Facility: CLINIC | Age: 67
End: 2022-03-16
Payer: MEDICARE

## 2022-03-16 PROCEDURE — 99442: CPT | Mod: 95

## 2022-03-16 NOTE — REASON FOR VISIT
[FreeTextEntry1] : My legs bother me still [Home] : at home, [unfilled] , at the time of the visit. [Medical Office: ([facility])___] : at the medical office located in  [Other:____] : [unfilled] [Verbal consent obtained from patient] : the patient, [unfilled]

## 2022-03-16 NOTE — HISTORY OF PRESENT ILLNESS
[FreeTextEntry1] : pt is s/p spinal stenosis surgery pt is receiving rehab\par since this surgery pt c/o thi foot numbness \par pt c/o nocturnal leg and foot cramps 1-2x/week\par intensity  mod to severe and currently worsening \par worse w lying in bed \par onset sev years ago w recent worsening \par pt states that she is in wheelchair and transfers  and can only ambulate w walker w difficulty  [de-identified] : Pt states now rare   thi  leg and foot nocturnal cramps but remain \par w improvement in intensity \par intensity  v mild   since last ov \par pt is currently on pletal 50 bid \par pt states no le wounds \par

## 2022-03-16 NOTE — ASSESSMENT
[FreeTextEntry1] : Impression le art insuff clinically  improved and now stable \par \par \par Plan med conserv managemnt  exercise program prn, protective measures \par continue pletal 50 bid \par advised pt to f/u w neurologist to see if additional neuro w/u is needed  or pain management \par pt agrees to do so \par pt wants to hold off on pletal inrease for now \par ov  w ben/pvr s/o art insuff 12 mo dec 2022 \par carotid duplex s/o stenosis  12mo feb 2023 then telehealth\par Telephonic visit  time duration 12  min\par \par \par \par \par \par  [Arterial/Venous Disease] : arterial/venous disease [Medication Management] : medication management [Foot care/Footwear] : foot care/footwear

## 2022-03-16 NOTE — PHYSICAL EXAM
[JVD] : no jugular venous distention  [Normal Breath Sounds] : Normal breath sounds [Stool Sample Taken] : No stool obtained  on rectal exam [No Rash or Lesion] : No rash or lesion [Alert] : alert [Oriented to Person] : oriented to person [Oriented to Place] : oriented to place [Oriented to Time] : oriented to time [Calm] : calm [de-identified] : nad [de-identified] : wnl [de-identified] : no resp distress [FreeTextEntry1] : Physical exam findings via telephonic review with patient \par \par  [de-identified] : cooperative

## 2022-03-22 NOTE — PHYSICAL THERAPY INITIAL EVALUATION ADULT - REHAB POTENTIAL, PT EVAL
Daily Note     Today's date: 3/22/2022   Patient name: Lila Sims  : 2022  MRN: [id number]  Referring provider: Rodo Man MD  Dx:   Encounter Diagnosis     ICD-10-CM    1  Developmental concern  R62 50    2  Gross motor delay  F82    3  Plagiocephaly  Q67 3        Start Time: 0803  Stop Time: 0845  Total time in clinic (min): 42 minutes      Subjective: Presents to skilled PT with mother in waiting room  Mom states patient stood up on a stool to get professional pictures  Objective:        Neuro Re-Ed   [facility] down ramp working on eccentric control   *Standing on BOSU ball in stride stance and romberg stance with support at hips while throwing rockets at wall - NP today   *Crawling down ramp for eccentric control  *Sitting and bouncing on ball working on postural control - rocking side to side and fwd/bkwd- NP today  *Stepping on and off BOSU ball with 1 HHA    Therapeutic Activity   -Crawling around gym chasing cars- pt internally rotating B arms when crawling   -Stair negotiation up/down with 1 HR on the L and leading with the R going up; L HR and leading with L going down- intermittent HHA- assisted with reciprocal pattern going up stairs today    -Throwing ball into hoop overhand with excellent aim  -Kicking ball into net- uses R foot to kick with poor SLS time noted - NP today  -Throwing large basketball into barrel    Therex  *Pt completing several squats to  ball and throw into b-ball hoop  *Walking up ramp working on LE strength  *Maintaining a squat while playing with joanna toy   Assessment: Tolerated treatment well  Patient demonstrated fatigue post treatment and would benefit from continued PT to improve strength, balance, and gross motor skills  Plan: Continue per plan of care  Weekly for 12-24weeks 
good, to achieve stated therapy goals

## 2022-03-31 ENCOUNTER — OUTPATIENT (OUTPATIENT)
Dept: OUTPATIENT SERVICES | Facility: HOSPITAL | Age: 67
LOS: 1 days | End: 2022-03-31
Payer: MEDICARE

## 2022-03-31 VITALS
TEMPERATURE: 98 F | DIASTOLIC BLOOD PRESSURE: 85 MMHG | OXYGEN SATURATION: 96 % | SYSTOLIC BLOOD PRESSURE: 127 MMHG | WEIGHT: 186.07 LBS | RESPIRATION RATE: 18 BRPM | HEIGHT: 65 IN | HEART RATE: 85 BPM

## 2022-03-31 DIAGNOSIS — I10 ESSENTIAL (PRIMARY) HYPERTENSION: ICD-10-CM

## 2022-03-31 DIAGNOSIS — Z98.890 OTHER SPECIFIED POSTPROCEDURAL STATES: Chronic | ICD-10-CM

## 2022-03-31 DIAGNOSIS — C50.911 MALIGNANT NEOPLASM OF UNSPECIFIED SITE OF RIGHT FEMALE BREAST: Chronic | ICD-10-CM

## 2022-03-31 DIAGNOSIS — H50.9 UNSPECIFIED STRABISMUS: Chronic | ICD-10-CM

## 2022-03-31 DIAGNOSIS — C07 MALIGNANT NEOPLASM OF PAROTID GLAND: Chronic | ICD-10-CM

## 2022-03-31 DIAGNOSIS — K21.9 GASTRO-ESOPHAGEAL REFLUX DISEASE WITHOUT ESOPHAGITIS: ICD-10-CM

## 2022-03-31 DIAGNOSIS — Z98.890 OTHER SPECIFIED POSTPROCEDURAL STATES: ICD-10-CM

## 2022-03-31 DIAGNOSIS — Z98.89 OTHER SPECIFIED POSTPROCEDURAL STATES: Chronic | ICD-10-CM

## 2022-03-31 DIAGNOSIS — Z12.11 ENCOUNTER FOR SCREENING FOR MALIGNANT NEOPLASM OF COLON: ICD-10-CM

## 2022-03-31 DIAGNOSIS — C43.72 MALIGNANT MELANOMA OF LEFT LOWER LIMB, INCLUDING HIP: Chronic | ICD-10-CM

## 2022-03-31 LAB
ANION GAP SERPL CALC-SCNC: 14 MMOL/L — SIGNIFICANT CHANGE UP (ref 5–17)
BUN SERPL-MCNC: 16 MG/DL — SIGNIFICANT CHANGE UP (ref 7–23)
CALCIUM SERPL-MCNC: 9.8 MG/DL — SIGNIFICANT CHANGE UP (ref 8.4–10.5)
CHLORIDE SERPL-SCNC: 108 MMOL/L — SIGNIFICANT CHANGE UP (ref 96–108)
CO2 SERPL-SCNC: 24 MMOL/L — SIGNIFICANT CHANGE UP (ref 22–31)
CREAT SERPL-MCNC: 0.85 MG/DL — SIGNIFICANT CHANGE UP (ref 0.5–1.3)
EGFR: 75 ML/MIN/1.73M2 — SIGNIFICANT CHANGE UP
GLUCOSE SERPL-MCNC: 103 MG/DL — HIGH (ref 70–99)
HCT VFR BLD CALC: 38.7 % — SIGNIFICANT CHANGE UP (ref 34.5–45)
HGB BLD-MCNC: 12.4 G/DL — SIGNIFICANT CHANGE UP (ref 11.5–15.5)
MCHC RBC-ENTMCNC: 30.5 PG — SIGNIFICANT CHANGE UP (ref 27–34)
MCHC RBC-ENTMCNC: 32 GM/DL — SIGNIFICANT CHANGE UP (ref 32–36)
MCV RBC AUTO: 95.3 FL — SIGNIFICANT CHANGE UP (ref 80–100)
NRBC # BLD: 0 /100 WBCS — SIGNIFICANT CHANGE UP (ref 0–0)
PLATELET # BLD AUTO: 248 K/UL — SIGNIFICANT CHANGE UP (ref 150–400)
POTASSIUM SERPL-MCNC: 4.6 MMOL/L — SIGNIFICANT CHANGE UP (ref 3.5–5.3)
POTASSIUM SERPL-SCNC: 4.6 MMOL/L — SIGNIFICANT CHANGE UP (ref 3.5–5.3)
RBC # BLD: 4.06 M/UL — SIGNIFICANT CHANGE UP (ref 3.8–5.2)
RBC # FLD: 13.3 % — SIGNIFICANT CHANGE UP (ref 10.3–14.5)
SODIUM SERPL-SCNC: 146 MMOL/L — HIGH (ref 135–145)
WBC # BLD: 5.9 K/UL — SIGNIFICANT CHANGE UP (ref 3.8–10.5)
WBC # FLD AUTO: 5.9 K/UL — SIGNIFICANT CHANGE UP (ref 3.8–10.5)

## 2022-03-31 PROCEDURE — 85027 COMPLETE CBC AUTOMATED: CPT

## 2022-03-31 PROCEDURE — G0463: CPT

## 2022-03-31 PROCEDURE — 36415 COLL VENOUS BLD VENIPUNCTURE: CPT

## 2022-03-31 PROCEDURE — 80048 BASIC METABOLIC PNL TOTAL CA: CPT

## 2022-03-31 NOTE — H&P PST ADULT - NSICDXPASTSURGICALHX_GEN_ALL_CORE_FT
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

## 2022-03-31 NOTE — H&P PST ADULT - PROBLEM SELECTOR PLAN 1
Scheduled for EGD/ colonoscopy  patient instructed to follow bowel prep per surgery   preop instruction discussed and patient verbalized understanding

## 2022-03-31 NOTE — H&P PST ADULT - HISTORY OF PRESENT ILLNESS
66 year old female with h/o syncope of unclear etiology s/p ILR implanted 8/2/2019 (no events 66 year old female with h/o syncope of unknown etiology s/p ILR implanted 8/2/2019 (? one event, report was contacted one time and was asked whether she was okay, pt states felt nothing." Parotid gland  66 year old female with h/o syncope of unknown etiology s/p ILR implanted 8/2/2019 (? 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 Breast malignancy 2007 with recurrent in 2014 with positive right axillary lymph node, s/p bilateral mastectomies with reconstruction and right lymph node dissection (2007, 2014), chemotherapy in 2007 and radiation therapy in 2014. L4-5 spondylolisthesis with spinal stenosis s/p decompressive laminectomy 3/23/2017, hysteroscopy for uterine polyp 10/19/2021, left foot melanoma excision with skin graft 2008. Her history also significant for ?TIA 7/2021, hypertension, osteoarthritis, PVD, hyperlipidemia, GERD, TB (treated 20 years ago), anxiety disorder.  Patient denies any recent travel or sick contact, scheduled for covid-19 PCR 4/4/2022 at [facility].

## 2022-03-31 NOTE — H&P PST ADULT - NSICDXPASTMEDICALHX_GEN_ALL_CORE_FT
PAST MEDICAL HISTORY:  Anxiety     Breast cancer in situ, right Chemo 2007, Radiation 2014    Capsular contracture of breast implant, initial encounter right breast    Essential hypertension     Facial droop right side, due to parotid surgery    Gastroesophageal reflux disease without esophagitis     History of loop recorder Placed 8/2/2019    History of TIA (transient ischemic attack) ??7/21    Hyperlipidemia     Lumbar radiculopathy     Melanoma in situ left foot    Parotid gland adenocarcinoma     Polyp of vagina     Syncope (pt w/ loop recorder)    TB (tuberculosis) 20 yrs ago treated for 1 year with medication     PAST MEDICAL HISTORY:  Anxiety     Breast cancer in situ, right Chemo 2007, Radiation 2014    Capsular contracture of breast implant, initial encounter right breast    Essential hypertension     Facial droop right side, due to parotid surgery    Gastroesophageal reflux disease without esophagitis     History of loop recorder Placed 8/2/2019    History of TIA (transient ischemic attack) ??7/21    Hyperlipidemia     Lumbar radiculopathy     Melanoma in situ left foot, 2008    Parotid gland adenocarcinoma     Polyp of vagina     Syncope (pt w/ loop recorder) 2019    TB (tuberculosis) 20 yrs ago treated for 1 year with medication

## 2022-03-31 NOTE — H&P PST ADULT - ENDOCRINE
POC reviewed with pt. Pt verbalized understanding. Questions and concerns addressed. No acute events overnight. Pt AAOx4. Pt on 2L NC. Fall/safety precautions maintained. Bed locked and in lowest position with call light within reach. Pt displays no s/s of distress at this time. See flowsheets for full assessment and VS information.       Problem: Adult Inpatient Plan of Care  Goal: Plan of Care Review  Outcome: Ongoing, Progressing  Goal: Optimal Comfort and Wellbeing  Outcome: Ongoing, Progressing     Problem: Fall Injury Risk  Goal: Absence of Fall and Fall-Related Injury  Outcome: Ongoing, Progressing     Problem: Skin Injury Risk Increased  Goal: Skin Health and Integrity  Outcome: Ongoing, Progressing      negative

## 2022-03-31 NOTE — H&P PST ADULT - NS MD HP INPLANTS MED DEV
Loop recorder ([employer] - Reveal Linq-LNQ11 serial - [id number]; titanium screws lumbar spine Loop recorder ([employer] - Reveal Linq-LNQ11 serial - [id number]; titanium screws lumbar spine, bilateral breast implant (saline)/Breast implant

## 2022-04-07 ENCOUNTER — RESULT REVIEW (OUTPATIENT)
Age: 67
End: 2022-04-07

## 2022-04-07 ENCOUNTER — OUTPATIENT (OUTPATIENT)
Dept: OUTPATIENT SERVICES | Facility: HOSPITAL | Age: 67
LOS: 1 days | End: 2022-04-07
Payer: MEDICARE

## 2022-04-07 ENCOUNTER — TRANSCRIPTION ENCOUNTER (OUTPATIENT)
Age: 67
End: 2022-04-07

## 2022-04-07 ENCOUNTER — APPOINTMENT (OUTPATIENT)
Dept: GASTROENTEROLOGY | Facility: HOSPITAL | Age: 67
End: 2022-04-07

## 2022-04-07 VITALS
TEMPERATURE: 98 F | HEART RATE: 102 BPM | WEIGHT: 186.07 LBS | DIASTOLIC BLOOD PRESSURE: 86 MMHG | RESPIRATION RATE: 18 BRPM | SYSTOLIC BLOOD PRESSURE: 128 MMHG | HEIGHT: 65 IN | OXYGEN SATURATION: 97 %

## 2022-04-07 VITALS
OXYGEN SATURATION: 97 % | HEART RATE: 83 BPM | RESPIRATION RATE: 19 BRPM | SYSTOLIC BLOOD PRESSURE: 126 MMHG | DIASTOLIC BLOOD PRESSURE: 86 MMHG

## 2022-04-07 DIAGNOSIS — C07 MALIGNANT NEOPLASM OF PAROTID GLAND: Chronic | ICD-10-CM

## 2022-04-07 DIAGNOSIS — Z98.890 OTHER SPECIFIED POSTPROCEDURAL STATES: Chronic | ICD-10-CM

## 2022-04-07 DIAGNOSIS — C50.911 MALIGNANT NEOPLASM OF UNSPECIFIED SITE OF RIGHT FEMALE BREAST: Chronic | ICD-10-CM

## 2022-04-07 DIAGNOSIS — H50.9 UNSPECIFIED STRABISMUS: Chronic | ICD-10-CM

## 2022-04-07 DIAGNOSIS — Z98.89 OTHER SPECIFIED POSTPROCEDURAL STATES: Chronic | ICD-10-CM

## 2022-04-07 DIAGNOSIS — C43.72 MALIGNANT MELANOMA OF LEFT LOWER LIMB, INCLUDING HIP: Chronic | ICD-10-CM

## 2022-04-07 DIAGNOSIS — K21.9 GASTRO-ESOPHAGEAL REFLUX DISEASE WITHOUT ESOPHAGITIS: ICD-10-CM

## 2022-04-07 PROCEDURE — 43239 EGD BIOPSY SINGLE/MULTIPLE: CPT | Mod: GC

## 2022-04-07 PROCEDURE — 88305 TISSUE EXAM BY PATHOLOGIST: CPT

## 2022-04-07 PROCEDURE — 45380 COLONOSCOPY AND BIOPSY: CPT | Mod: PT

## 2022-04-07 PROCEDURE — 45380 COLONOSCOPY AND BIOPSY: CPT | Mod: GC

## 2022-04-07 PROCEDURE — 88305 TISSUE EXAM BY PATHOLOGIST: CPT | Mod: 26

## 2022-04-07 PROCEDURE — 43239 EGD BIOPSY SINGLE/MULTIPLE: CPT

## 2022-04-07 DEVICE — NET RETRV ROT ROTH 2.5MMX230CM: Type: IMPLANTABLE DEVICE | Status: FUNCTIONAL

## 2022-04-07 RX ORDER — OMEPRAZOLE 10 MG/1
1 CAPSULE, DELAYED RELEASE ORAL
Qty: 0 | Refills: 0 | DISCHARGE

## 2022-04-07 RX ORDER — AMLODIPINE BESYLATE 2.5 MG/1
1 TABLET ORAL
Qty: 0 | Refills: 0 | DISCHARGE

## 2022-04-07 RX ORDER — CILOSTAZOL 100 MG/1
1 TABLET ORAL
Qty: 0 | Refills: 0 | DISCHARGE

## 2022-04-07 RX ORDER — ASPIRIN/CALCIUM CARB/MAGNESIUM 324 MG
1 TABLET ORAL
Qty: 0 | Refills: 0 | DISCHARGE

## 2022-04-07 RX ORDER — MELOXICAM 15 MG/1
1 TABLET ORAL
Qty: 0 | Refills: 0 | DISCHARGE

## 2022-04-07 RX ORDER — MELOXICAM 15 MG/1
7.5 TABLET ORAL
Qty: 0 | Refills: 0 | DISCHARGE

## 2022-04-07 RX ORDER — SODIUM CHLORIDE 9 MG/ML
500 INJECTION INTRAMUSCULAR; INTRAVENOUS; SUBCUTANEOUS
Refills: 0 | Status: DISCONTINUED | OUTPATIENT
Start: 2022-04-07 | End: 2022-04-21

## 2022-04-07 RX ORDER — ATORVASTATIN CALCIUM 80 MG/1
1 TABLET, FILM COATED ORAL
Qty: 0 | Refills: 0 | DISCHARGE

## 2022-04-07 NOTE — ASU DISCHARGE PLAN (ADULT/PEDIATRIC) - NS MD DC FALL RISK RISK
For information on Fall & Injury Prevention, visit: [email or web address] OR  [email or web address] OR  https://www.cdc.gov/steadi/patient.html

## 2022-04-07 NOTE — PRE PROCEDURE NOTE - PRE PROCEDURE EVALUATION
Attending Physician:             Sarah Kuo               Procedure: upper endoscopy, colonoscopy    Indication for Procedure: GERD, colon cancer screening  ________________________________________________________  PAST MEDICAL & SURGICAL HISTORY:  Essential hypertension    Gastroesophageal reflux disease without esophagitis    Breast cancer in situ, right  Chemo 2007, Radiation 2014    TB (tuberculosis)  20 yrs ago treated for 1 year with medication    Parotid gland adenocarcinoma    Melanoma in situ  left foot, 2008    Facial droop  right side, due to parotid surgery    Capsular contracture of breast implant, initial encounter  right breast    Anxiety    Lumbar radiculopathy    Hyperlipidemia    History of TIA (transient ischemic attack)  ??7/21    Polyp of vagina    History of loop recorder  Placed 8/2/2019    Syncope  (pt w/ loop recorder) 2019    H/O bilateral mastectomy  2007 , reconstruction surgery  Bilateral reconstruction 2014    Carcinoma of parotid gland  s/p excision right parotid gland, with RT    Malignant melanoma of left foot  s/p excision, SURGICAL MGMT    Chest wall recurrence of breast cancer, right  s/p surgery 2014    Strabismus  eye surgery as child, both    History of lumbar laminectomy  3/23/2017    History of loop recorder  [employer] - Reveal LINQ- LNQ11  [id number]    H/O dilation and curettage      ALLERGIES:  cefadroxil (Swelling)  ciprofloxacin (Pruritus)  hydrocodone (Pruritus)  Percocet 5/325 (Other)    HOME MEDICATIONS:  amLODIPine 10 mg oral tablet: 1 tab(s) orally once a day  Aspir 81 oral delayed release tablet: 1 tab(s) orally once a day  atorvastatin 10 mg oral tablet: 1 tab(s) orally once a day  cilostazol 50 mg oral tablet: 1 tab(s) orally 2 times a day  gabapentin 300 mg oral capsule: 1 cap(s) orally 2 times a day  letrozole 2.5 mg oral tablet: 1 tab(s) orally once a day  meloxicam 7.5 mg oral tablet: 1 tab(s) orally once a day  omeprazole 40 mg oral delayed release capsule: 1 cap(s) orally once a day  Vitamin D3 2000 intl units oral capsule: 1 cap(s) orally once a day    AICD/PPM: [ ] yes   [ ] no    PERTINENT LAB DATA:                      PHYSICAL EXAMINATION:    T(C): --  HR: --  BP: --  RR: --  SpO2: --    Constitutional: NAD  HEENT: PERRLA, EOMI,    Neck:  No JVD  Respiratory: CTAB/L  Cardiovascular: S1 and S2  Gastrointestinal: BS+, soft, NT/ND  Extremities: No peripheral edema  Neurological: A/O x 3, no focal deficits  Psychiatric: Normal mood, normal affect  Skin: No rashes    ASA Class: I [ ]  II [ ]  III [ ]  IV [ ]    COMMENTS:    The patient is a suitable candidate for the planned procedure unless box checked [ ]  No, explain:

## 2022-04-07 NOTE — ASU PATIENT PROFILE, ADULT - FALL HARM RISK - HARM RISK INTERVENTIONS

## 2022-04-07 NOTE — ASU PATIENT PROFILE, ADULT - NSICDXPASTMEDICALHX_GEN_ALL_CORE_FT
PAST MEDICAL HISTORY:  Anxiety     Breast cancer in situ, right Chemo 2007, Radiation 2014    Capsular contracture of breast implant, initial encounter right breast    Essential hypertension     Facial droop right side, due to parotid surgery    Gastroesophageal reflux disease without esophagitis     History of loop recorder Placed 8/2/2019    History of TIA (transient ischemic attack) ??7/21    Hyperlipidemia     Lumbar radiculopathy     Melanoma in situ left foot, 2008    Parotid gland adenocarcinoma     Polyp of vagina     Syncope (pt w/ loop recorder) 2019    TB (tuberculosis) 20 yrs ago treated for 1 year with medication

## 2022-04-07 NOTE — ASU PATIENT PROFILE, ADULT - NSICDXPASTSURGICALHX_GEN_ALL_CORE_FT
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

## 2022-04-07 NOTE — PRE-ANESTHESIA EVALUATION ADULT - NS MD HP INPLANTS MED DEV
Loop recorder ([employer] - Reveal Linq-LNQ11 serial - [id number]; titanium screws lumbar spine, bilateral breast implant (saline)/Breast implant

## 2022-04-11 LAB — SURGICAL PATHOLOGY STUDY: SIGNIFICANT CHANGE UP

## 2022-04-15 ENCOUNTER — RX RENEWAL (OUTPATIENT)
Age: 67
End: 2022-04-15

## 2022-04-18 ENCOUNTER — OUTPATIENT (OUTPATIENT)
Dept: OUTPATIENT SERVICES | Facility: HOSPITAL | Age: 67
LOS: 1 days | Discharge: HOME | End: 2022-04-18

## 2022-04-18 DIAGNOSIS — Z98.890 OTHER SPECIFIED POSTPROCEDURAL STATES: Chronic | ICD-10-CM

## 2022-04-18 DIAGNOSIS — C07 MALIGNANT NEOPLASM OF PAROTID GLAND: Chronic | ICD-10-CM

## 2022-04-18 DIAGNOSIS — Z98.89 OTHER SPECIFIED POSTPROCEDURAL STATES: Chronic | ICD-10-CM

## 2022-04-18 DIAGNOSIS — C50.911 MALIGNANT NEOPLASM OF UNSPECIFIED SITE OF RIGHT FEMALE BREAST: Chronic | ICD-10-CM

## 2022-04-18 DIAGNOSIS — H50.9 UNSPECIFIED STRABISMUS: Chronic | ICD-10-CM

## 2022-04-18 DIAGNOSIS — C43.72 MALIGNANT MELANOMA OF LEFT LOWER LIMB, INCLUDING HIP: Chronic | ICD-10-CM

## 2022-04-18 DIAGNOSIS — C50.919 MALIGNANT NEOPLASM OF UNSPECIFIED SITE OF UNSPECIFIED FEMALE BREAST: ICD-10-CM

## 2022-04-18 PROBLEM — R55 SYNCOPE AND COLLAPSE: Chronic | Status: ACTIVE | Noted: 2021-10-05

## 2022-04-22 ENCOUNTER — APPOINTMENT (OUTPATIENT)
Dept: INFUSION THERAPY | Facility: HOSPITAL | Age: 67
End: 2022-04-22

## 2022-04-22 ENCOUNTER — APPOINTMENT (OUTPATIENT)
Dept: HEMATOLOGY ONCOLOGY | Facility: CLINIC | Age: 67
End: 2022-04-22
Payer: MEDICARE

## 2022-04-22 VITALS
HEIGHT: 65 IN | TEMPERATURE: 97.4 F | SYSTOLIC BLOOD PRESSURE: 123 MMHG | RESPIRATION RATE: 16 BRPM | OXYGEN SATURATION: 96 % | WEIGHT: 187.39 LBS | DIASTOLIC BLOOD PRESSURE: 79 MMHG | HEART RATE: 75 BPM | BODY MASS INDEX: 31.22 KG/M2

## 2022-04-22 PROCEDURE — 99213 OFFICE O/P EST LOW 20 MIN: CPT

## 2022-04-23 NOTE — HISTORY OF PRESENT ILLNESS
[Disease: _____________________] : Disease: [unfilled] [de-identified] : She was initially diagnosed with breast cancer at age 52 with multifocal right breast cancer.  She had bilateral mastectomies and sentinel lymph node biopsy.  She had in the right breast a 1.5 cm infiltrating lobular carcinoma with negative sentinel lymph nodes.  The ER was 99%, GA was 99%, [id number] was negative.  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  She received for Stage I breast cancer: AC followed by T dose dense from 6/2007 to 10/2007.  She was then placed on tamoxifen from 2007 and switched to anastrozole to complete 5 years of treatment on 12/1/2012.  On follow up in 2014, she had new palpable right breast finding and biopsy confirmed breast cancer.  On 12/3/14, she had right breast mass excision with axillary lymph node dissection.  The pathology showed invasive lobular carcinoma involving the dermis and superficial subcutaneous tissue along with 1 out of 15 lymph nodes involved by carcinoma.  The ER was 95%, GA was 95%, Rkw7yih was CISH negative. She was started on letrozole. She had worsening back pain from spinal stenosis despite physical therapy. She had L4-5 posterior interbody fusion on 3/2017. She developed postoperative R calf DVT and was started initially on warfarin then Xarelto which was stopped after anticoagulation course and physical therapy to improve mobility.  [de-identified] : invasive lobular ER 95%, NC 95%, Gby7dqd CISH negative [de-identified] : dd ACT 6/2007 to 10/2007\par tamoxifen then anastrozole 2007 to 12/2012\par Letrozole 1/2015 to present  [de-identified] : Continues to have trouble walking but able to ambulate with crutch. Denies any new back pain or cough or SOB or headaches. She has been told her letrozole copay is 105 which she cannot afford. Does not know why the copay increased: previously was $20. She denies any new chest wall pain. No new health changes or new medications. No new chest wall changes.

## 2022-04-23 NOTE — REVIEW OF SYSTEMS
[Difficulty Walking] : difficulty walking [Negative] : Allergic/Immunologic [Confused] : no confusion [Dizziness] : no dizziness [Fainting] : no fainting [de-identified] : needs crutch

## 2022-04-23 NOTE — PHYSICAL EXAM
[Restricted in physically strenuous activity but ambulatory and able to carry out work of a light or sedentary nature] : Status 1- Restricted in physically strenuous activity but ambulatory and able to carry out work of a light or sedentary nature, e.g., light house work, office work [Normal] : affect appropriate [de-identified] : bilateral reconstruction with post surgical scar R axilla  [de-identified] : facial droop chronic  [de-identified] : ambulating with rolling walker

## 2022-04-23 NOTE — ASSESSMENT
[FreeTextEntry1] : She is a 65 y/o F with recurrent lobular right breast cancer that recurred 3 years off endocrine therapy. She has been on letrozole since 1/2015. She continues to tolerate letrozole without any new signs or symptoms of breast cancer recurrence. Last bone density done 4/2021: osteopenia of the wrist. We reviewed calcium and Vitamin D supplementation along with exercise to maintain bone health. Will check and see with [facility] if same price. Reviewed rationale for duration of therapy given recurrence of breast cancer. We reviewed signs and symptoms of breast cancer recurrence. No new signs or symptoms of recurrence. She will continue to flush port. Next follow up in 6 months but earlier if any new symptoms.\par

## 2022-04-24 DIAGNOSIS — R11.2 NAUSEA WITH VOMITING, UNSPECIFIED: ICD-10-CM

## 2022-04-24 DIAGNOSIS — Z51.11 ENCOUNTER FOR ANTINEOPLASTIC CHEMOTHERAPY: ICD-10-CM

## 2022-04-25 ENCOUNTER — NON-APPOINTMENT (OUTPATIENT)
Age: 67
End: 2022-04-25

## 2022-05-10 ENCOUNTER — APPOINTMENT (OUTPATIENT)
Dept: OTOLARYNGOLOGY | Facility: CLINIC | Age: 67
End: 2022-05-10
Payer: MEDICARE

## 2022-05-10 VITALS
WEIGHT: 187 LBS | SYSTOLIC BLOOD PRESSURE: 126 MMHG | HEIGHT: 65 IN | HEART RATE: 91 BPM | DIASTOLIC BLOOD PRESSURE: 79 MMHG | BODY MASS INDEX: 31.16 KG/M2

## 2022-05-10 DIAGNOSIS — R68.89 OTHER GENERAL SYMPTOMS AND SIGNS: ICD-10-CM

## 2022-05-10 DIAGNOSIS — K21.9 GASTRO-ESOPHAGEAL REFLUX DISEASE W/OUT ESOPHAGITIS: ICD-10-CM

## 2022-05-10 PROCEDURE — 69210 REMOVE IMPACTED EAR WAX UNI: CPT

## 2022-05-10 PROCEDURE — 99214 OFFICE O/P EST MOD 30 MIN: CPT | Mod: 25

## 2022-05-10 RX ORDER — RANITIDINE HYDROCHLORIDE 300 MG/1
TABLET, FILM COATED ORAL
Refills: 0 | Status: COMPLETED | COMMUNITY
End: 2022-05-10

## 2022-05-10 NOTE — HISTORY OF PRESENT ILLNESS
[de-identified] : 67 year old female with history of mass in left parapharyngeal space for over 17 years presents for follow up. S/p right parotid cancer about 17 years ago and the lesion was picked up on a f/u scan.   Denies pain in mouth mass. Denies dysphagia, odynophagia, dyspnea, dysphonia, difficulties with neck movement. States weight stable. MRI 03/02/2019.\par \par Pt also reports cerumen impaction and frequent throat clearing.  She has been diagnosed with LPR in the past. \par

## 2022-05-10 NOTE — CONSULT LETTER
[Dear  ___] : Dear  [unfilled], [Consult Letter:] : I had the pleasure of evaluating your patient, [unfilled]. [Please see my note below.] : Please see my note below. [Sincerely,] : Sincerely, [FreeTextEntry2] : Terrie Ortiz MD (Jeremy CHATMAN) [FreeTextEntry3] : James Tompkins MD, FACS\par Chief of Otolaryngology at [facility]\par \par Dept. of Otolaryngology\par [facility]\par  [DrJimi  ___] : Dr. LAWSON

## 2022-05-10 NOTE — REASON FOR VISIT
[Subsequent Evaluation] : a subsequent evaluation for [FreeTextEntry2] : f/u re: ears and parapharyngeal mass

## 2022-05-10 NOTE — PHYSICAL EXAM
[Midline] : trachea located in midline position [Normal] : no rashes [de-identified] : No mass palpable.  Right parotid scar present [de-identified] : CI bilaterally.  Removed.   [FreeTextEntry2] : Right facial synkinesis [de-identified] : Pt wheelchair bound but can stand with support [de-identified] : Facial synkinesis

## 2022-05-10 NOTE — REVIEW OF SYSTEMS
[As Noted in HPI] : as noted in HPI [Throat Clearing] : throat clearing [Negative] : Heme/Lymph [de-identified] : recurrent cerumen impaction

## 2022-05-10 NOTE — PROCEDURE
[Cerumen Impaction] : Cerumen Impaction [Same] : same as the Pre Op Dx. [] : Removal of Cerumen [FreeTextEntry4] : None

## 2022-05-13 ENCOUNTER — NON-APPOINTMENT (OUTPATIENT)
Age: 67
End: 2022-05-13

## 2022-05-13 ENCOUNTER — APPOINTMENT (OUTPATIENT)
Dept: OPHTHALMOLOGY | Facility: CLINIC | Age: 67
End: 2022-05-13
Payer: MEDICARE

## 2022-05-13 PROCEDURE — 92015 DETERMINE REFRACTIVE STATE: CPT

## 2022-05-13 PROCEDURE — 92014 COMPRE OPH EXAM EST PT 1/>: CPT

## 2022-06-03 ENCOUNTER — OUTPATIENT (OUTPATIENT)
Dept: OUTPATIENT SERVICES | Facility: HOSPITAL | Age: 67
LOS: 1 days | Discharge: HOME | End: 2022-06-03

## 2022-06-03 DIAGNOSIS — Z98.890 OTHER SPECIFIED POSTPROCEDURAL STATES: Chronic | ICD-10-CM

## 2022-06-03 DIAGNOSIS — Z98.89 OTHER SPECIFIED POSTPROCEDURAL STATES: Chronic | ICD-10-CM

## 2022-06-03 DIAGNOSIS — C07 MALIGNANT NEOPLASM OF PAROTID GLAND: Chronic | ICD-10-CM

## 2022-06-03 DIAGNOSIS — C50.919 MALIGNANT NEOPLASM OF UNSPECIFIED SITE OF UNSPECIFIED FEMALE BREAST: ICD-10-CM

## 2022-06-03 DIAGNOSIS — H50.9 UNSPECIFIED STRABISMUS: Chronic | ICD-10-CM

## 2022-06-03 DIAGNOSIS — C43.72 MALIGNANT MELANOMA OF LEFT LOWER LIMB, INCLUDING HIP: Chronic | ICD-10-CM

## 2022-06-03 DIAGNOSIS — C50.911 MALIGNANT NEOPLASM OF UNSPECIFIED SITE OF RIGHT FEMALE BREAST: Chronic | ICD-10-CM

## 2022-06-06 ENCOUNTER — RX RENEWAL (OUTPATIENT)
Age: 67
End: 2022-06-06

## 2022-06-10 ENCOUNTER — APPOINTMENT (OUTPATIENT)
Dept: INFUSION THERAPY | Facility: HOSPITAL | Age: 67
End: 2022-06-10

## 2022-06-21 ENCOUNTER — APPOINTMENT (OUTPATIENT)
Dept: INTERNAL MEDICINE | Facility: CLINIC | Age: 67
End: 2022-06-21
Payer: MEDICARE

## 2022-06-21 VITALS
WEIGHT: 185 LBS | HEIGHT: 65 IN | BODY MASS INDEX: 30.82 KG/M2 | OXYGEN SATURATION: 97 % | HEART RATE: 111 BPM | DIASTOLIC BLOOD PRESSURE: 80 MMHG | SYSTOLIC BLOOD PRESSURE: 120 MMHG

## 2022-06-21 DIAGNOSIS — Z87.42 PERSONAL HISTORY OF OTHER DISEASES OF THE FEMALE GENITAL TRACT: ICD-10-CM

## 2022-06-21 DIAGNOSIS — M25.512 PAIN IN LEFT SHOULDER: ICD-10-CM

## 2022-06-21 DIAGNOSIS — Z12.11 ENCOUNTER FOR SCREENING FOR MALIGNANT NEOPLASM OF COLON: ICD-10-CM

## 2022-06-21 DIAGNOSIS — R07.89 OTHER CHEST PAIN: ICD-10-CM

## 2022-06-21 DIAGNOSIS — Z71.9 COUNSELING, UNSPECIFIED: ICD-10-CM

## 2022-06-21 DIAGNOSIS — Z01.818 ENCOUNTER FOR OTHER PREPROCEDURAL EXAMINATION: ICD-10-CM

## 2022-06-21 PROCEDURE — G0439: CPT

## 2022-06-21 RX ORDER — ASCORBIC ACID 125 MG
125 TABLET,CHEWABLE ORAL
Refills: 0 | Status: DISCONTINUED | COMMUNITY
End: 2022-06-21

## 2022-06-22 DIAGNOSIS — R74.8 ABNORMAL LEVELS OF OTHER SERUM ENZYMES: ICD-10-CM

## 2022-06-22 PROBLEM — Z87.42 HISTORY OF POSTMENOPAUSAL BLEEDING: Status: RESOLVED | Noted: 2018-12-13 | Resolved: 2022-06-22

## 2022-06-22 LAB
ALBUMIN SERPL ELPH-MCNC: 4.9 G/DL
ALP BLD-CCNC: 125 U/L
ALT SERPL-CCNC: 8 U/L
ANION GAP SERPL CALC-SCNC: 17 MMOL/L
AST SERPL-CCNC: 14 U/L
BASOPHILS # BLD AUTO: 0.05 K/UL
BASOPHILS NFR BLD AUTO: 0.7 %
BILIRUB SERPL-MCNC: 0.4 MG/DL
BUN SERPL-MCNC: 16 MG/DL
CALCIUM SERPL-MCNC: 9.9 MG/DL
CANCER AG27-29 SERPL-ACNC: 9.4 U/ML
CEA SERPL-MCNC: 1.5 NG/ML
CHLORIDE SERPL-SCNC: 105 MMOL/L
CHOLEST SERPL-MCNC: 160 MG/DL
CO2 SERPL-SCNC: 23 MMOL/L
CREAT SERPL-MCNC: 0.83 MG/DL
EGFR: 76 ML/MIN/1.73M2
EOSINOPHIL # BLD AUTO: 0.08 K/UL
EOSINOPHIL NFR BLD AUTO: 1.2 %
ESTIMATED AVERAGE GLUCOSE: 126 MG/DL
GLUCOSE SERPL-MCNC: 90 MG/DL
HBA1C MFR BLD HPLC: 6 %
HCT VFR BLD CALC: 40.6 %
HDLC SERPL-MCNC: 78 MG/DL
HGB BLD-MCNC: 12.6 G/DL
IMM GRANULOCYTES NFR BLD AUTO: 0.4 %
LDLC SERPL CALC-MCNC: 68 MG/DL
LYMPHOCYTES # BLD AUTO: 1.66 K/UL
LYMPHOCYTES NFR BLD AUTO: 24 %
MAN DIFF?: NORMAL
MCHC RBC-ENTMCNC: 30.1 PG
MCHC RBC-ENTMCNC: 31 GM/DL
MCV RBC AUTO: 97.1 FL
MONOCYTES # BLD AUTO: 0.44 K/UL
MONOCYTES NFR BLD AUTO: 6.3 %
NEUTROPHILS # BLD AUTO: 4.67 K/UL
NEUTROPHILS NFR BLD AUTO: 67.4 %
NONHDLC SERPL-MCNC: 82 MG/DL
PLATELET # BLD AUTO: 254 K/UL
POTASSIUM SERPL-SCNC: 4.1 MMOL/L
PROT SERPL-MCNC: 7.9 G/DL
RBC # BLD: 4.18 M/UL
RBC # FLD: 12.9 %
SODIUM SERPL-SCNC: 145 MMOL/L
TRIGL SERPL-MCNC: 71 MG/DL
TSH SERPL-ACNC: 1.86 UIU/ML
WBC # FLD AUTO: 6.93 K/UL

## 2022-06-22 NOTE — PHYSICAL EXAM
[No Acute Distress] : no acute distress [Well Nourished] : well nourished [Well Developed] : well developed [Well-Appearing] : well-appearing [Normal Sclera/Conjunctiva] : normal sclera/conjunctiva [PERRL] : pupils equal round and reactive to light [EOMI] : extraocular movements intact [Normal Outer Ear/Nose] : the outer ears and nose were normal in appearance [No JVD] : no jugular venous distention [No Lymphadenopathy] : no lymphadenopathy [Supple] : supple [Thyroid Normal, No Nodules] : the thyroid was normal and there were no nodules present [No Respiratory Distress] : no respiratory distress  [No Accessory Muscle Use] : no accessory muscle use [Clear to Auscultation] : lungs were clear to auscultation bilaterally [Normal Rate] : normal rate  [Regular Rhythm] : with a regular rhythm [Normal S1, S2] : normal S1 and S2 [No Murmur] : no murmur heard [No Carotid Bruits] : no carotid bruits [No Abdominal Bruit] : a ~M bruit was not heard ~T in the abdomen [Pedal Pulses Present] : the pedal pulses are present [No Edema] : there was no peripheral edema [No Palpable Aorta] : no palpable aorta [No Extremity Clubbing/Cyanosis] : no extremity clubbing/cyanosis [No Nipple Discharge] : no nipple discharge [No Axillary Lymphadenopathy] : no axillary lymphadenopathy [Soft] : abdomen soft [Non Tender] : non-tender [Non-distended] : non-distended [No Masses] : no abdominal mass palpated [No HSM] : no HSM [Normal Bowel Sounds] : normal bowel sounds [Normal Posterior Cervical Nodes] : no posterior cervical lymphadenopathy [Normal Anterior Cervical Nodes] : no anterior cervical lymphadenopathy [No CVA Tenderness] : no CVA  tenderness [No Spinal Tenderness] : no spinal tenderness [No Joint Swelling] : no joint swelling [Grossly Normal Strength/Tone] : grossly normal strength/tone [No Rash] : no rash [Coordination Grossly Intact] : coordination grossly intact [Normal Gait] : normal gait [Deep Tendon Reflexes (DTR)] : deep tendon reflexes were 2+ and symmetric [Normal Affect] : the affect was normal [Normal Insight/Judgement] : insight and judgment were intact [de-identified] : right ear with cerumen impaction [de-identified] : s/p bilateral breast implants [de-identified] : RLE weakness, improved

## 2022-06-22 NOTE — HISTORY OF PRESENT ILLNESS
[de-identified] : 66 y/o F h/o breast CA s/p b/l mastectomy, melanoma, AICD, HTN, HLD, \par here for AWV.\par Lives in first floor apartment of her  mother's house. Cooks, shops for herself. She handles her finances.\par Sees several providers.\par Neurology- sees Dr. Emiliano Nielson - recently placed on Duloxetine- for leg cramps. She thinks it is helping a little. Just started about 3 weeks ago.\par Ophthalmology: Sees opthalmology- Dr. Morgan Curtis\par ENT: Sees Dr. Tompkins- cerumemariah impaction\par PVD: Sees Dr. Snowden- on ciloztasol\par CVS: Sees cardiologist at [place], last saw him a year ago. Gets AICD checked regularly. Sees Dr. Eron Pringle\par GI: Sees Dr. Perez, recently colonoscopy and EGD. Remains on daily PPI\par GYN: Had some post-menopausal bleeding, had an endometrial biopsy\par Onc: Follows with medical oncology (Dr. Tito Norris), rad/onc- Dr. Pope\par h/o melanoma- follows with Dr. Esqueda and also sees Dr. Brown for dermatology\par Trying to increase exercise, walks with weights. [facility]

## 2022-06-22 NOTE — ASSESSMENT
[FreeTextEntry1] : 68 y/o F h/o breast CA s/p b/l mastectomy and reconstruction, melanoma, parotid gland neoplasm, HTN, HLD, PVD, prediabetes here for AWV\par Onc: Follows with medical and surgical oncology. Check tumor markers in anticipation of upcoming oncology visit\par HTN: BP well controlled, c/w regimen\par HLD: on statin, check flp, lft's \par PVD: sees vascular surgeon. On cilostazol and aspirin, along with statin\par Prediabetes: Check alc\par HCM: Rx for Shingrix eprescribed. DEXA and colon utd. Follow with breast surgeon\par rto 6 mos

## 2022-06-22 NOTE — HEALTH RISK ASSESSMENT
[Never] : Never [No] : No [No falls in past year] : Patient reported no falls in the past year [0] : 2) Feeling down, depressed, or hopeless: Not at all (0) [PHQ-2 Negative - No further assessment needed] : PHQ-2 Negative - No further assessment needed [Alone] : lives alone [Retired] : retired [Single] : single [Fully functional (bathing, dressing, toileting, transferring, walking, feeding)] : Fully functional (bathing, dressing, toileting, transferring, walking, feeding) [Fully functional (using the telephone, shopping, preparing meals, housekeeping, doing laundry, using] : Fully functional and needs no help or supervision to perform IADLs (using the telephone, shopping, preparing meals, housekeeping, doing laundry, using transportation, managing medications and managing finances) [With Patient/Caregiver] : , with patient/caregiver [Good] : ~his/her~  mood as  good [de-identified] : walks [[id number]] : 0 [Patient reported PAP Smear was abnormal] : Patient reported PAP Smear was abnormal [Patient reported bone density results were abnormal] : Patient reported bone density results were abnormal [Patient reported colonoscopy was normal] : Patient reported colonoscopy was normal [Change in mental status noted] : No change in mental status noted [Transportation] : transportation [Sexually Active] : not sexually active [Reports changes in hearing] : Reports no changes in hearing [Reports changes in vision] : Reports changes in vision [MammogramComments] : patient s/p b/l mastectomy [PapSmearDate] : 01/21 [BoneDensityDate] : 06/21 [BoneDensityComments] : osteopenia [ColonoscopyDate] : 04/22 [ColonoscopyComments] : needs 5 year follow up [AdvancecareDate] : 06/21/22 [FreeTextEntry4] : Rizwana Ocampo [phone]

## 2022-06-23 LAB — GGT SERPL-CCNC: 23 U/L

## 2022-07-27 ENCOUNTER — OUTPATIENT (OUTPATIENT)
Dept: OUTPATIENT SERVICES | Facility: HOSPITAL | Age: 67
LOS: 1 days | Discharge: HOME | End: 2022-07-27

## 2022-07-27 DIAGNOSIS — C43.72 MALIGNANT MELANOMA OF LEFT LOWER LIMB, INCLUDING HIP: Chronic | ICD-10-CM

## 2022-07-27 DIAGNOSIS — C50.911 MALIGNANT NEOPLASM OF UNSPECIFIED SITE OF RIGHT FEMALE BREAST: Chronic | ICD-10-CM

## 2022-07-27 DIAGNOSIS — Z98.890 OTHER SPECIFIED POSTPROCEDURAL STATES: Chronic | ICD-10-CM

## 2022-07-27 DIAGNOSIS — C50.919 MALIGNANT NEOPLASM OF UNSPECIFIED SITE OF UNSPECIFIED FEMALE BREAST: ICD-10-CM

## 2022-07-27 DIAGNOSIS — C07 MALIGNANT NEOPLASM OF PAROTID GLAND: Chronic | ICD-10-CM

## 2022-07-27 DIAGNOSIS — Z98.89 OTHER SPECIFIED POSTPROCEDURAL STATES: Chronic | ICD-10-CM

## 2022-07-27 DIAGNOSIS — H50.9 UNSPECIFIED STRABISMUS: Chronic | ICD-10-CM

## 2022-08-01 ENCOUNTER — APPOINTMENT (OUTPATIENT)
Dept: RHEUMATOLOGY | Facility: CLINIC | Age: 67
End: 2022-08-01

## 2022-08-01 VITALS
OXYGEN SATURATION: 97 % | RESPIRATION RATE: 16 BRPM | WEIGHT: 183 LBS | SYSTOLIC BLOOD PRESSURE: 148 MMHG | HEART RATE: 89 BPM | TEMPERATURE: 97.6 F | BODY MASS INDEX: 30.49 KG/M2 | HEIGHT: 65 IN | DIASTOLIC BLOOD PRESSURE: 100 MMHG

## 2022-08-01 DIAGNOSIS — R79.82 ELEVATED C-REACTIVE PROTEIN (CRP): ICD-10-CM

## 2022-08-01 PROCEDURE — 99203 OFFICE O/P NEW LOW 30 MIN: CPT

## 2022-08-05 ENCOUNTER — RESULT REVIEW (OUTPATIENT)
Age: 67
End: 2022-08-05

## 2022-08-05 ENCOUNTER — APPOINTMENT (OUTPATIENT)
Dept: INFUSION THERAPY | Facility: HOSPITAL | Age: 67
End: 2022-08-05

## 2022-08-05 LAB
ALBUMIN SERPL ELPH-MCNC: 4.2 G/DL — SIGNIFICANT CHANGE UP (ref 3.3–5)
ALP SERPL-CCNC: 116 U/L — SIGNIFICANT CHANGE UP (ref 40–120)
ALT FLD-CCNC: 9 U/L — LOW (ref 10–45)
ANION GAP SERPL CALC-SCNC: 12 MMOL/L — SIGNIFICANT CHANGE UP (ref 5–17)
AST SERPL-CCNC: 14 U/L — SIGNIFICANT CHANGE UP (ref 10–40)
BASOPHILS # BLD AUTO: 0.05 K/UL — SIGNIFICANT CHANGE UP (ref 0–0.2)
BASOPHILS NFR BLD AUTO: 0.8 % — SIGNIFICANT CHANGE UP (ref 0–2)
BILIRUB SERPL-MCNC: 0.2 MG/DL — SIGNIFICANT CHANGE UP (ref 0.2–1.2)
BUN SERPL-MCNC: 13 MG/DL — SIGNIFICANT CHANGE UP (ref 7–23)
CALCIUM SERPL-MCNC: 9.3 MG/DL — SIGNIFICANT CHANGE UP (ref 8.4–10.5)
CEA SERPL-MCNC: 1.3 NG/ML — SIGNIFICANT CHANGE UP (ref 0–3.8)
CHLORIDE SERPL-SCNC: 107 MMOL/L — SIGNIFICANT CHANGE UP (ref 96–108)
CO2 SERPL-SCNC: 24 MMOL/L — SIGNIFICANT CHANGE UP (ref 22–31)
CREAT SERPL-MCNC: 0.68 MG/DL — SIGNIFICANT CHANGE UP (ref 0.5–1.3)
EGFR: 94 ML/MIN/1.73M2 — SIGNIFICANT CHANGE UP
EOSINOPHIL # BLD AUTO: 0.16 K/UL — SIGNIFICANT CHANGE UP (ref 0–0.5)
EOSINOPHIL NFR BLD AUTO: 2.6 % — SIGNIFICANT CHANGE UP (ref 0–6)
GLUCOSE SERPL-MCNC: 86 MG/DL — SIGNIFICANT CHANGE UP (ref 70–99)
HCT VFR BLD CALC: 35.3 % — SIGNIFICANT CHANGE UP (ref 34.5–45)
HGB BLD-MCNC: 11.6 G/DL — SIGNIFICANT CHANGE UP (ref 11.5–15.5)
IMM GRANULOCYTES NFR BLD AUTO: 0.5 % — SIGNIFICANT CHANGE UP (ref 0–1.5)
LYMPHOCYTES # BLD AUTO: 1.57 K/UL — SIGNIFICANT CHANGE UP (ref 1–3.3)
LYMPHOCYTES # BLD AUTO: 25.8 % — SIGNIFICANT CHANGE UP (ref 13–44)
MCHC RBC-ENTMCNC: 31.1 PG — SIGNIFICANT CHANGE UP (ref 27–34)
MCHC RBC-ENTMCNC: 32.9 G/DL — SIGNIFICANT CHANGE UP (ref 32–36)
MCV RBC AUTO: 94.6 FL — SIGNIFICANT CHANGE UP (ref 80–100)
MONOCYTES # BLD AUTO: 0.34 K/UL — SIGNIFICANT CHANGE UP (ref 0–0.9)
MONOCYTES NFR BLD AUTO: 5.6 % — SIGNIFICANT CHANGE UP (ref 2–14)
NEUTROPHILS # BLD AUTO: 3.94 K/UL — SIGNIFICANT CHANGE UP (ref 1.8–7.4)
NEUTROPHILS NFR BLD AUTO: 64.7 % — SIGNIFICANT CHANGE UP (ref 43–77)
NRBC # BLD: 0 /100 WBCS — SIGNIFICANT CHANGE UP (ref 0–0)
PLATELET # BLD AUTO: 217 K/UL — SIGNIFICANT CHANGE UP (ref 150–400)
POTASSIUM SERPL-MCNC: 3.8 MMOL/L — SIGNIFICANT CHANGE UP (ref 3.5–5.3)
POTASSIUM SERPL-SCNC: 3.8 MMOL/L — SIGNIFICANT CHANGE UP (ref 3.5–5.3)
PROT SERPL-MCNC: 7.3 G/DL — SIGNIFICANT CHANGE UP (ref 6–8.3)
RBC # BLD: 3.73 M/UL — LOW (ref 3.8–5.2)
RBC # FLD: 12.9 % — SIGNIFICANT CHANGE UP (ref 10.3–14.5)
SODIUM SERPL-SCNC: 143 MMOL/L — SIGNIFICANT CHANGE UP (ref 135–145)
WBC # BLD: 6.09 K/UL — SIGNIFICANT CHANGE UP (ref 3.8–10.5)
WBC # FLD AUTO: 6.09 K/UL — SIGNIFICANT CHANGE UP (ref 3.8–10.5)

## 2022-08-08 LAB — CANCER AG27-29 SERPL-ACNC: 8.1 U/ML — SIGNIFICANT CHANGE UP (ref 0–38.6)

## 2022-08-28 PROBLEM — R79.82 CRP ELEVATED: Status: ACTIVE | Noted: 2022-08-28

## 2022-08-28 LAB
ANION GAP SERPL CALC-SCNC: 13 MMOL/L
BUN SERPL-MCNC: 20 MG/DL
CALCIUM SERPL-MCNC: 9.7 MG/DL
CHLORIDE SERPL-SCNC: 109 MMOL/L
CO2 SERPL-SCNC: 26 MMOL/L
CREAT SERPL-MCNC: 0.92 MG/DL
CRP SERPL-MCNC: 11 MG/L
EGFR: 67 ML/MIN/1.73M2
MAGNESIUM SERPL-MCNC: 2.1 MG/DL
POTASSIUM SERPL-SCNC: 4.4 MMOL/L
SODIUM SERPL-SCNC: 148 MMOL/L

## 2022-09-09 ENCOUNTER — APPOINTMENT (OUTPATIENT)
Dept: MRI IMAGING | Facility: IMAGING CENTER | Age: 67
End: 2022-09-09

## 2022-09-09 ENCOUNTER — OUTPATIENT (OUTPATIENT)
Dept: OUTPATIENT SERVICES | Facility: HOSPITAL | Age: 67
LOS: 1 days | End: 2022-09-09
Payer: MEDICARE

## 2022-09-09 ENCOUNTER — APPOINTMENT (OUTPATIENT)
Dept: MRI IMAGING | Facility: CLINIC | Age: 67
End: 2022-09-09

## 2022-09-09 ENCOUNTER — RESULT REVIEW (OUTPATIENT)
Age: 67
End: 2022-09-09

## 2022-09-09 DIAGNOSIS — Z98.890 OTHER SPECIFIED POSTPROCEDURAL STATES: Chronic | ICD-10-CM

## 2022-09-09 DIAGNOSIS — C43.72 MALIGNANT MELANOMA OF LEFT LOWER LIMB, INCLUDING HIP: Chronic | ICD-10-CM

## 2022-09-09 DIAGNOSIS — C07 MALIGNANT NEOPLASM OF PAROTID GLAND: Chronic | ICD-10-CM

## 2022-09-09 DIAGNOSIS — H50.9 UNSPECIFIED STRABISMUS: Chronic | ICD-10-CM

## 2022-09-09 DIAGNOSIS — Z98.89 OTHER SPECIFIED POSTPROCEDURAL STATES: Chronic | ICD-10-CM

## 2022-09-09 DIAGNOSIS — Z00.8 ENCOUNTER FOR OTHER GENERAL EXAMINATION: ICD-10-CM

## 2022-09-09 DIAGNOSIS — C50.911 MALIGNANT NEOPLASM OF UNSPECIFIED SITE OF RIGHT FEMALE BREAST: Chronic | ICD-10-CM

## 2022-09-09 PROCEDURE — C8937: CPT

## 2022-09-09 PROCEDURE — A9585: CPT

## 2022-09-09 PROCEDURE — 77049 MRI BREAST C-+ W/CAD BI: CPT | Mod: 26,MH

## 2022-09-09 PROCEDURE — C8908: CPT | Mod: MH

## 2022-09-16 ENCOUNTER — APPOINTMENT (OUTPATIENT)
Dept: MRI IMAGING | Facility: IMAGING CENTER | Age: 67
End: 2022-09-16

## 2022-09-16 ENCOUNTER — OUTPATIENT (OUTPATIENT)
Dept: OUTPATIENT SERVICES | Facility: HOSPITAL | Age: 67
LOS: 1 days | End: 2022-09-16
Payer: MEDICARE

## 2022-09-16 DIAGNOSIS — R22.1 LOCALIZED SWELLING, MASS AND LUMP, NECK: ICD-10-CM

## 2022-09-16 DIAGNOSIS — C50.911 MALIGNANT NEOPLASM OF UNSPECIFIED SITE OF RIGHT FEMALE BREAST: Chronic | ICD-10-CM

## 2022-09-16 DIAGNOSIS — C43.72 MALIGNANT MELANOMA OF LEFT LOWER LIMB, INCLUDING HIP: Chronic | ICD-10-CM

## 2022-09-16 DIAGNOSIS — H50.9 UNSPECIFIED STRABISMUS: Chronic | ICD-10-CM

## 2022-09-16 DIAGNOSIS — Z98.890 OTHER SPECIFIED POSTPROCEDURAL STATES: Chronic | ICD-10-CM

## 2022-09-16 DIAGNOSIS — C07 MALIGNANT NEOPLASM OF PAROTID GLAND: Chronic | ICD-10-CM

## 2022-09-16 DIAGNOSIS — Z98.89 OTHER SPECIFIED POSTPROCEDURAL STATES: Chronic | ICD-10-CM

## 2022-09-16 PROCEDURE — G1004: CPT

## 2022-09-16 PROCEDURE — 70543 MRI ORBT/FAC/NCK W/O &W/DYE: CPT | Mod: MG

## 2022-09-16 PROCEDURE — 70543 MRI ORBT/FAC/NCK W/O &W/DYE: CPT | Mod: 26,MG

## 2022-09-16 PROCEDURE — A9585: CPT

## 2022-10-07 ENCOUNTER — NON-APPOINTMENT (OUTPATIENT)
Age: 67
End: 2022-10-07

## 2022-10-17 ENCOUNTER — OUTPATIENT (OUTPATIENT)
Dept: OUTPATIENT SERVICES | Facility: HOSPITAL | Age: 67
LOS: 1 days | Discharge: HOME | End: 2022-10-17

## 2022-10-17 DIAGNOSIS — Z98.89 OTHER SPECIFIED POSTPROCEDURAL STATES: Chronic | ICD-10-CM

## 2022-10-17 DIAGNOSIS — C50.911 MALIGNANT NEOPLASM OF UNSPECIFIED SITE OF RIGHT FEMALE BREAST: Chronic | ICD-10-CM

## 2022-10-17 DIAGNOSIS — Z98.890 OTHER SPECIFIED POSTPROCEDURAL STATES: Chronic | ICD-10-CM

## 2022-10-17 DIAGNOSIS — H50.9 UNSPECIFIED STRABISMUS: Chronic | ICD-10-CM

## 2022-10-17 DIAGNOSIS — C43.72 MALIGNANT MELANOMA OF LEFT LOWER LIMB, INCLUDING HIP: Chronic | ICD-10-CM

## 2022-10-17 DIAGNOSIS — C07 MALIGNANT NEOPLASM OF PAROTID GLAND: Chronic | ICD-10-CM

## 2022-10-17 DIAGNOSIS — C50.919 MALIGNANT NEOPLASM OF UNSPECIFIED SITE OF UNSPECIFIED FEMALE BREAST: ICD-10-CM

## 2022-10-21 ENCOUNTER — APPOINTMENT (OUTPATIENT)
Dept: HEMATOLOGY ONCOLOGY | Facility: CLINIC | Age: 67
End: 2022-10-21

## 2022-10-21 ENCOUNTER — APPOINTMENT (OUTPATIENT)
Dept: INFUSION THERAPY | Facility: HOSPITAL | Age: 67
End: 2022-10-21

## 2022-10-21 VITALS
OXYGEN SATURATION: 98 % | TEMPERATURE: 97.3 F | SYSTOLIC BLOOD PRESSURE: 129 MMHG | WEIGHT: 178.57 LBS | HEART RATE: 78 BPM | BODY MASS INDEX: 29.72 KG/M2 | DIASTOLIC BLOOD PRESSURE: 82 MMHG | RESPIRATION RATE: 16 BRPM

## 2022-10-21 PROCEDURE — 99213 OFFICE O/P EST LOW 20 MIN: CPT

## 2022-10-21 NOTE — PHYSICAL EXAM
[Restricted in physically strenuous activity but ambulatory and able to carry out work of a light or sedentary nature] : Status 1- Restricted in physically strenuous activity but ambulatory and able to carry out work of a light or sedentary nature, e.g., light house work, office work [Normal] : affect appropriate [de-identified] : facial droop chronic  [de-identified] : bilateral reconstruction with post surgical scar R axilla  [de-identified] : ambulating with rolling walker

## 2022-10-21 NOTE — HISTORY OF PRESENT ILLNESS
[Disease: _____________________] : Disease: [unfilled] [de-identified] : She was initially diagnosed with breast cancer at age 52 with multifocal right breast cancer.  She had bilateral mastectomies and sentinel lymph node biopsy.  She had in the right breast a 1.5 cm infiltrating lobular carcinoma with negative sentinel lymph nodes.  The ER was 99%, UT was 99%, Gpx2chs was negative.  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  She received for Stage I breast cancer: AC followed by T dose dense from 6/2007 to 10/2007.  She was then placed on tamoxifen from 2007 and switched to anastrozole to complete 5 years of treatment on 12/1/2012.  On follow up in 2014, she had new palpable right breast finding and biopsy confirmed breast cancer.  On 12/3/14, she had right breast mass excision with axillary lymph node dissection.  The pathology showed invasive lobular carcinoma involving the dermis and superficial subcutaneous tissue along with 1 out of 15 lymph nodes involved by carcinoma.  The ER was 95%, UT was 95%, Sgf3xxy was CISH negative. She was started on letrozole. She had worsening back pain from spinal stenosis despite physical therapy. She had L4-5 posterior interbody fusion on 3/2017. She developed postoperative R calf DVT and was started initially on warfarin then Xarelto which was stopped after anticoagulation course and physical therapy to improve mobility.  [de-identified] : invasive lobular ER 95%, UT 95%, Nxn7xss CISH negative [de-identified] : dd ACT 6/2007 to 10/2007\par tamoxifen then anastrozole 2007 to 12/2012\par Letrozole 1/2015 to present  [de-identified] : She continues to tolerate letrozole well. Will be due for bone density in April. Denies any new breast pain or chest wall changes. No back pain, cough or HA. Started on duloxetine and helps her sleep. No other new medications or health changes. Ambulating with cane and weights on feet.

## 2022-10-21 NOTE — ASSESSMENT
[FreeTextEntry1] : She is a 68 y/o F with recurrent lobular right breast cancer that recurred 3 years off endocrine therapy. She has been on letrozole since 1/2015. She continues to tolerate letrozole without any new signs or symptoms of breast cancer recurrence. We reviewed calcium and Vitamin D supplementation along with exercise to maintain bone health. Will do interval bone density. Reviewed blood work: tumor markers WNL and no new symptoms of recurrence. Next follow up in 6 months but earlier if any new symptoms.\par \par

## 2022-10-24 ENCOUNTER — RX RENEWAL (OUTPATIENT)
Age: 67
End: 2022-10-24

## 2022-12-12 ENCOUNTER — APPOINTMENT (OUTPATIENT)
Dept: SURGICAL ONCOLOGY | Facility: CLINIC | Age: 67
End: 2022-12-12

## 2022-12-12 VITALS
OXYGEN SATURATION: 96 % | BODY MASS INDEX: 29.66 KG/M2 | RESPIRATION RATE: 16 BRPM | HEART RATE: 85 BPM | SYSTOLIC BLOOD PRESSURE: 118 MMHG | WEIGHT: 178 LBS | HEIGHT: 65 IN | DIASTOLIC BLOOD PRESSURE: 80 MMHG

## 2022-12-12 PROCEDURE — 99214 OFFICE O/P EST MOD 30 MIN: CPT

## 2022-12-12 NOTE — PHYSICAL EXAM
[Normal] : supple, no neck mass and thyroid not enlarged [Normal Neck Lymph Nodes] : normal neck lymph nodes  [Normal Supraclavicular Lymph Nodes] : normal supraclavicular lymph nodes [Normal Groin Lymph Nodes] : normal groin lymph nodes [Normal Axillary Lymph Nodes] : normal axillary lymph nodes [Normal] : full range of motion and no deformities appreciated [de-identified] : Below

## 2022-12-12 NOTE — HISTORY OF PRESENT ILLNESS
[de-identified] : 67 year-old lady.\par \par July 2008:\par Wide excision of a melanoma in situ of the LEFT FOOT, on the plantar aspect, with negative margins, and reconstruction by Dr. Paul Stokes.\par \par No relatives with melanoma.\par \par \par July 2021 she had a TIA manifest with problems with balance.\par She was hospitalized at [facility] and diagnosed with a hypertensive crisis which required 1 week of treatment, prior to discharge.\par \par She had spinal stenosis surgery, 2017, by Dr. Jamie Mullins at [facility].\par ***Her postoperative course was complicated by a DVT***\par \par Hospn in April 2016 @ [facility] with severe viral infection.\par \par \par She had a right parotidectomy for PAROTID CANCER in 2004 by Dr. Jamie Taveras, followed by radiation therapy.\par She follows up with Dr. James Tompkins for a left parapharyngeal mass.\par \par \par In 1977 she had Lumpectomy for a MALIGNANT PHYLLODES tumor of the RIGHT breast.\par A left breast biopsy in 2000 was benign.\par In April 2007 she had a right mastectomy for BREAST CANCER, and a prophylactic left mastectomy, by Dr. Susan Palleschi, and reconstruction by Dr. Roy.\par She had a follow-up with breast surgery in 2021\par In December 2014 she had surgery for recurrent right breast cancer, followed by radiation therapy by Dr.Beatrice Pope.\par Her hematologist was Dr. Metz, now Dr. Tito HOPPER at the [facility]\par Chemotherapy in 2007.\par Tamoxifen and anastrozole, [id number].\par Letrozole from January 2015 to presently, October 2022 visit, no worrisome findings\par \par \par Her dermatologist is Dr. Caron RUIZ, November 2021 was unremarkable.\par December 2022 visit is scheduled\par \par \par Her internist is Dr. Terrie JEROME.\par \par She does not have a pacemaker or defibrillator.\par +LOOP-RECORDER, , After a syncopal episode, Summer 2019, \par Cardiology is at [facility]\par \par She takes baby aspirin daily.\par \par July 2021 she had a TIA manifest with problems with balance.\par She was hospitalized at [facility] and diagnosed with a hypertensive crisis which required 1 week of treatment, prior to discharge.\par \par Medications:\par Omeprazole and Zantac for GERD.\par \par Letrozole as adjuvant treatment for her recurrent breast cancer.\par \par For chronic pain management she is on gabapentin for neuropathy; \par Neurology: Dr. Rajwinder FLETCHER\par \par She had spinal stenosis surgery, 2017, by Dr. Jamie Mullins at [facility].\par ***Her postoperative course was complicated by a DVT***\par \par She has seen Dr. Luis Snowden from vascular surgery for arterial lower extremity insufficiency.\par She's been treated with exercise, and cilostazol.\par \par Rheumatology: Dr. Janet WEST\par \par \par Her gynecologist is Dr. Rosalba GUTIERREZ\par December 2018 Pap smear was unremarkable.\par February 2021 visit was unremarkable.\par Was referred to Dr. Serena Oreilly for consideration of hysterectomy for fibroids, no operation recommended presently.\par September 2021 D&C was normal\par \par \par Eye examination, October 2022 was ok, our ophthalmology department at [address] [place] [address].\par \par \par She has had bilateral mastectomies.\par \par \par Colonoscopy was repeated in April 2022 by Dr. Sarah Kuo: Okay x5 years

## 2022-12-12 NOTE — REVIEW OF SYSTEMS
[Negative] : Endocrine [FreeTextEntry4] : Parotid cancer [FreeTextEntry5] : Loop recorder [FreeTextEntry8] : Reflux [de-identified] : Surgery for spinal stenosis [de-identified] : Melanoma [de-identified] : History of TIA [FreeTextEntry1] : Breast cancer

## 2022-12-15 ENCOUNTER — APPOINTMENT (OUTPATIENT)
Dept: INTERNAL MEDICINE | Facility: CLINIC | Age: 67
End: 2022-12-15

## 2022-12-15 VITALS
DIASTOLIC BLOOD PRESSURE: 70 MMHG | WEIGHT: 178 LBS | HEIGHT: 65 IN | HEART RATE: 106 BPM | OXYGEN SATURATION: 95 % | BODY MASS INDEX: 29.66 KG/M2 | SYSTOLIC BLOOD PRESSURE: 110 MMHG

## 2022-12-15 DIAGNOSIS — Z87.898 PERSONAL HISTORY OF OTHER SPECIFIED CONDITIONS: ICD-10-CM

## 2022-12-15 PROCEDURE — 99213 OFFICE O/P EST LOW 20 MIN: CPT

## 2022-12-15 RX ORDER — OMEPRAZOLE MAGNESIUM 20 MG/1
20 TABLET, DELAYED RELEASE ORAL
Refills: 0 | Status: DISCONTINUED | COMMUNITY
End: 2022-12-15

## 2022-12-15 RX ORDER — DULOXETINE HYDROCHLORIDE 30 MG/1
30 CAPSULE, DELAYED RELEASE PELLETS ORAL
Qty: 30 | Refills: 0 | Status: DISCONTINUED | COMMUNITY
Start: 2022-06-09 | End: 2022-12-15

## 2022-12-15 RX ORDER — SAW/PYGEUM/BETA/HERB/D3/B6/ZN 30 MG-25MG
200 CAPSULE ORAL
Refills: 0 | Status: DISCONTINUED | COMMUNITY
End: 2022-12-15

## 2022-12-15 NOTE — ASSESSMENT
[FreeTextEntry1] : 68 y/o F here for f/u. H/o HTN, HLD, PVD, prediabetes, breast CA, melanoma, IBS here for f/u\par HTN: Remains controlled with diet and exercise\par HLD: on statin\par HCM: Rx for Shingrix sent to pharmacy\par rto for AWV

## 2022-12-15 NOTE — PHYSICAL EXAM
[No JVD] : no jugular venous distention [No Respiratory Distress] : no respiratory distress  [No Accessory Muscle Use] : no accessory muscle use [Clear to Auscultation] : lungs were clear to auscultation bilaterally [Normal Rate] : normal rate  [Regular Rhythm] : with a regular rhythm [Normal S1, S2] : normal S1 and S2 [No Edema] : there was no peripheral edema [Soft] : abdomen soft [Non Tender] : non-tender [No HSM] : no HSM [No CVA Tenderness] : no CVA  tenderness [No Spinal Tenderness] : no spinal tenderness

## 2022-12-15 NOTE — HEALTH RISK ASSESSMENT
[With Patient/Caregiver] : , with patient/caregiver [AdvancecareDate] : 12/15/22 [FreeTextEntry4] : Noel montoya- [phone]

## 2022-12-15 NOTE — HISTORY OF PRESENT ILLNESS
[de-identified] : 66 y/o F here for f/u. H/o HTN, HLD, PVD, prediabetes, breast CA, melanoma, IBS here for f/u\par Doing well.\par Had all immnizations. had MRI of breast (Dr. Palleschi).\par Saw Dr. Tompkins for ENT\par Saw Dr. Esqueda for melanoma\par Walking is improving, strength is better\par Plans to see onc in April.\par Did not obtain Shingrix. \par Cooking for herself\par Sees  at [facility] for cardiology- Dr. Tena- was told "all good". Plan is to take Defibrillator out this year\par

## 2022-12-27 ENCOUNTER — APPOINTMENT (OUTPATIENT)
Dept: VASCULAR SURGERY | Facility: CLINIC | Age: 67
End: 2022-12-27

## 2022-12-27 VITALS
HEART RATE: 88 BPM | SYSTOLIC BLOOD PRESSURE: 121 MMHG | HEIGHT: 65 IN | WEIGHT: 180 LBS | BODY MASS INDEX: 29.99 KG/M2 | TEMPERATURE: 97.7 F | DIASTOLIC BLOOD PRESSURE: 78 MMHG

## 2022-12-27 DIAGNOSIS — I87.2 VENOUS INSUFFICIENCY (CHRONIC) (PERIPHERAL): ICD-10-CM

## 2022-12-27 DIAGNOSIS — R09.89 OTHER SPECIFIED SYMPTOMS AND SIGNS INVOLVING THE CIRCULATORY AND RESPIRATORY SYSTEMS: ICD-10-CM

## 2022-12-27 PROCEDURE — 93923 UPR/LXTR ART STDY 3+ LVLS: CPT

## 2022-12-27 PROCEDURE — 99214 OFFICE O/P EST MOD 30 MIN: CPT

## 2022-12-27 NOTE — ASSESSMENT
[Arterial/Venous Disease] : arterial/venous disease [Medication Management] : medication management [Foot care/Footwear] : foot care/footwear [FreeTextEntry1] : Impression le art insuff clinically  improved and stable \par \par \par Plan med conserv managemnt  exercise program prn, protective measures \par continue pletal 50 bid \par ov  w ben/pvr s/o art insuff 12 mo dec 2023 \par rot carotid duplex s/o stenosis  12mo feb 2023 then telehealth\par \par \par \par \par \par \par \par \par

## 2022-12-27 NOTE — HISTORY OF PRESENT ILLNESS
[FreeTextEntry1] : pt is s/p spinal stenosis surgery pt is receiving rehab\par since this surgery pt c/o thi foot numbness \par pt c/o nocturnal leg and foot cramps 1-2x/week\par intensity  mod to severe and currently worsening \par worse w lying in bed \par onset sev years ago w recent worsening \par pt states that she is in wheelchair and transfers  and can only ambulate w walker w difficulty  [de-identified] : Pt states now rare   thi  leg and foot nocturnal cramps but remain \par w improvement in intensity  \par now v  mild since last ov \par pt is currently on pletal 50 bid \par \par

## 2022-12-27 NOTE — DATA REVIEWED
[FreeTextEntry1] : 6/20/2018 AID patent AA and damien iliac arterial systems  no sig stenosis\par \par 6/20/2018 BELEM/PVR mild to mod infragenic art insuff w vessel calcification  Rt BELEM 1.21 lt BELEM 1.17\par \par 7/2/2019 BELEM/PVR mild to mod infragenic art insuff w vessel calcification  Rt BELEM 1.19 lt BELEM 1.19\par \par 12/22/2020 BELEM/PVR mild to mod infragenic art insuff w vessel calcification  Rt BELEM 1.28 lt BELEM 1.23\par \par 1/21/2021 Carotid Duplex  Rt ICA  less 50% stenosis (179/66) by velocity criteria\par                          Lt  ICA  less 50% stenosis (109/54) by velocity criteria\par                          Damien Ant Vertebral Arterial Flow \par \par 12/21/2021  BELEM/PVR mild to mod infragenic art insuff w vessel calcification  Rt BELEM 1.27 lt BELEM 1.24\par \par 1/18/2022 Carotid Duplex  Rt ICA  less 50% stenosis \par                          Lt  ICA  less 50% stenosis \par                          Damien Ant Vertebral Arterial Flow \par \par 12/27/2022   BELEM/PVR mild to mod infragenic art insuff w vessel calcification  \par                           Rt BELEM 1.10 lt BELEM 1.19\par

## 2022-12-27 NOTE — PHYSICAL EXAM
[Normal Breath Sounds] : Normal breath sounds [2+] : left 2+ [1+] : left 1+ [Ankle Swelling (On Exam)] : present [Ankle Swelling Bilaterally] : bilaterally  [Varicose Veins Of Lower Extremities] : bilaterally [] : bilaterally [Ankle Swelling On The Right] : mild [No Rash or Lesion] : No rash or lesion [Alert] : alert [Oriented to Person] : oriented to person [Oriented to Place] : oriented to place [Oriented to Time] : oriented to time [Calm] : calm [JVD] : no jugular venous distention  [Right Carotid Bruit] : right carotid bruit heard [Left Carotid Bruit] : left carotid bruit heard [de-identified] : nad [de-identified] : wnl [de-identified] : no resp distress [FreeTextEntry1] : mild to mod art insuff w mod trophic skin changes \par mild venous insufff w mild  thi le edema and mild st dermatitis from knee distally \par multiple small v veins and spider v thi shins and ankles 1-2 mm  [de-identified] : ambulates slowly  [de-identified] : cn 2-12 thi grossly intact  [de-identified] : cooperative

## 2023-01-09 ENCOUNTER — OUTPATIENT (OUTPATIENT)
Dept: OUTPATIENT SERVICES | Facility: HOSPITAL | Age: 68
LOS: 1 days | Discharge: HOME | End: 2023-01-09

## 2023-01-09 DIAGNOSIS — H50.9 UNSPECIFIED STRABISMUS: Chronic | ICD-10-CM

## 2023-01-09 DIAGNOSIS — Z98.890 OTHER SPECIFIED POSTPROCEDURAL STATES: Chronic | ICD-10-CM

## 2023-01-09 DIAGNOSIS — C43.72 MALIGNANT MELANOMA OF LEFT LOWER LIMB, INCLUDING HIP: Chronic | ICD-10-CM

## 2023-01-09 DIAGNOSIS — C07 MALIGNANT NEOPLASM OF PAROTID GLAND: Chronic | ICD-10-CM

## 2023-01-09 DIAGNOSIS — Z98.89 OTHER SPECIFIED POSTPROCEDURAL STATES: Chronic | ICD-10-CM

## 2023-01-09 DIAGNOSIS — C50.911 MALIGNANT NEOPLASM OF UNSPECIFIED SITE OF RIGHT FEMALE BREAST: Chronic | ICD-10-CM

## 2023-01-09 DIAGNOSIS — C50.919 MALIGNANT NEOPLASM OF UNSPECIFIED SITE OF UNSPECIFIED FEMALE BREAST: ICD-10-CM

## 2023-01-13 ENCOUNTER — APPOINTMENT (OUTPATIENT)
Dept: INFUSION THERAPY | Facility: HOSPITAL | Age: 68
End: 2023-01-13

## 2023-02-17 ENCOUNTER — APPOINTMENT (OUTPATIENT)
Dept: INFUSION THERAPY | Facility: HOSPITAL | Age: 68
End: 2023-02-17

## 2023-03-14 ENCOUNTER — APPOINTMENT (OUTPATIENT)
Dept: VASCULAR SURGERY | Facility: CLINIC | Age: 68
End: 2023-03-14
Payer: MEDICARE

## 2023-03-14 VITALS
BODY MASS INDEX: 29.99 KG/M2 | TEMPERATURE: 97.8 F | DIASTOLIC BLOOD PRESSURE: 79 MMHG | HEIGHT: 65 IN | HEART RATE: 80 BPM | SYSTOLIC BLOOD PRESSURE: 112 MMHG | WEIGHT: 180 LBS

## 2023-03-14 PROCEDURE — 93880 EXTRACRANIAL BILAT STUDY: CPT

## 2023-03-14 PROCEDURE — 99214 OFFICE O/P EST MOD 30 MIN: CPT

## 2023-03-14 NOTE — ASSESSMENT
[Arterial/Venous Disease] : arterial/venous disease [Medication Management] : medication management [Foot care/Footwear] : foot care/footwear [FreeTextEntry1] : Impression le art insuff clinically  improved and stable  and stable carotid stenosis \par \par \par Plan med conserv managemnt  exercise program prn, protective measures \par continue pletal 50 bid \par ov  w ben/pvr s/o art insuff 12 mo dec 2023 \par rov w  carotid duplex s/o stenosis  2 years  march 2024 \par \par \par \par \par \par \par \par \par

## 2023-03-14 NOTE — DATA REVIEWED
[FreeTextEntry1] : 6/20/2018 AID patent AA and damien iliac arterial systems  no sig stenosis\par \par 6/20/2018 BELEM/PVR mild to mod infragenic art insuff w vessel calcification  Rt BELEM 1.21 lt BELEM 1.17\par \par 7/2/2019 BELEM/PVR mild to mod infragenic art insuff w vessel calcification  Rt BELEM 1.19 lt BELEM 1.19\par \par 12/22/2020 BELEM/PVR mild to mod infragenic art insuff w vessel calcification  Rt BELEM 1.28 lt BELEM 1.23\par \par 1/21/2021 Carotid Duplex  Rt ICA  less 50% stenosis (179/66) by velocity criteria\par                          Lt  ICA  less 50% stenosis (109/54) by velocity criteria\par                          Damien Ant Vertebral Arterial Flow \par \par 12/21/2021  BELEM/PVR mild to mod infragenic art insuff w vessel calcification  Rt BELEM 1.27 lt BELEM 1.24\par \par 1/18/2022 Carotid Duplex  Rt ICA  less 50% stenosis \par                          Lt  ICA  less 50% stenosis \par                          Damien Ant Vertebral Arterial Flow \par \par 12/27/2022   BELEM/PVR mild to mod infragenic art insuff w vessel calcification  \par                           Rt BELEM 1.10 lt BELEM 1.19\par \par 3/14/2023 Carotid Duplex  Rt ICA  less 50% stenosis \par                          Lt  ICA  less 50% stenosis \par                          Damien Ant Vertebral Arterial Flow \par \par

## 2023-03-14 NOTE — HISTORY OF PRESENT ILLNESS
[FreeTextEntry1] : pt is s/p spinal stenosis surgery pt is receiving rehab\par since this surgery pt c/o thi foot numbness \par pt c/o nocturnal leg and foot cramps 1-2x/week\par intensity  mod to severe and currently worsening \par worse w lying in bed \par onset sev years ago w recent worsening \par pt states that she is in wheelchair and transfers  and can only ambulate w walker w difficulty  [de-identified] : Pt states less thi  leg and foot nocturnal cramps from previous visit \par pt is currently on pletal 50 bid \par pt states no cerebrovasc c/o \par \par

## 2023-03-14 NOTE — PHYSICAL EXAM
[Normal Breath Sounds] : Normal breath sounds [2+] : left 2+ [Right Carotid Bruit] : right carotid bruit heard [Left Carotid Bruit] : left carotid bruit heard [1+] : left 1+ [Ankle Swelling (On Exam)] : present [Ankle Swelling Bilaterally] : bilaterally  [Varicose Veins Of Lower Extremities] : bilaterally [] : bilaterally [Ankle Swelling On The Right] : mild [No Rash or Lesion] : No rash or lesion [Alert] : alert [Oriented to Person] : oriented to person [Oriented to Place] : oriented to place [Oriented to Time] : oriented to time [Calm] : calm [JVD] : no jugular venous distention  [de-identified] : nad [de-identified] : wnl [de-identified] : no resp distress [FreeTextEntry1] : mild to mod art insuff w mod trophic skin changes \par mild venous insufff w mild  thi le edema and mild st dermatitis from knee distally \par multiple small v veins and spider v thi shins and ankles 1-2 mm  [de-identified] : wml [de-identified] : cn 2-12 thi grossly intact  [de-identified] : cooperative

## 2023-03-21 ENCOUNTER — NON-APPOINTMENT (OUTPATIENT)
Age: 68
End: 2023-03-21

## 2023-03-22 ENCOUNTER — OUTPATIENT (OUTPATIENT)
Dept: OUTPATIENT SERVICES | Facility: HOSPITAL | Age: 68
LOS: 1 days | Discharge: HOME | End: 2023-03-22

## 2023-03-22 DIAGNOSIS — C50.911 MALIGNANT NEOPLASM OF UNSPECIFIED SITE OF RIGHT FEMALE BREAST: Chronic | ICD-10-CM

## 2023-03-22 DIAGNOSIS — C50.919 MALIGNANT NEOPLASM OF UNSPECIFIED SITE OF UNSPECIFIED FEMALE BREAST: ICD-10-CM

## 2023-03-22 DIAGNOSIS — Z98.890 OTHER SPECIFIED POSTPROCEDURAL STATES: Chronic | ICD-10-CM

## 2023-03-22 DIAGNOSIS — Z98.89 OTHER SPECIFIED POSTPROCEDURAL STATES: Chronic | ICD-10-CM

## 2023-03-22 DIAGNOSIS — C43.72 MALIGNANT MELANOMA OF LEFT LOWER LIMB, INCLUDING HIP: Chronic | ICD-10-CM

## 2023-03-22 DIAGNOSIS — H50.9 UNSPECIFIED STRABISMUS: Chronic | ICD-10-CM

## 2023-03-22 DIAGNOSIS — C07 MALIGNANT NEOPLASM OF PAROTID GLAND: Chronic | ICD-10-CM

## 2023-03-23 ENCOUNTER — RESULT REVIEW (OUTPATIENT)
Age: 68
End: 2023-03-23

## 2023-03-23 ENCOUNTER — APPOINTMENT (OUTPATIENT)
Dept: ULTRASOUND IMAGING | Facility: IMAGING CENTER | Age: 68
End: 2023-03-23
Payer: MEDICARE

## 2023-03-23 ENCOUNTER — APPOINTMENT (OUTPATIENT)
Dept: HEMATOLOGY ONCOLOGY | Facility: CLINIC | Age: 68
End: 2023-03-23
Payer: MEDICARE

## 2023-03-23 ENCOUNTER — OUTPATIENT (OUTPATIENT)
Dept: OUTPATIENT SERVICES | Facility: HOSPITAL | Age: 68
LOS: 1 days | End: 2023-03-23
Payer: COMMERCIAL

## 2023-03-23 VITALS
OXYGEN SATURATION: 99 % | HEART RATE: 83 BPM | SYSTOLIC BLOOD PRESSURE: 112 MMHG | TEMPERATURE: 97 F | RESPIRATION RATE: 16 BRPM | DIASTOLIC BLOOD PRESSURE: 75 MMHG | BODY MASS INDEX: 29.72 KG/M2 | WEIGHT: 178.57 LBS

## 2023-03-23 DIAGNOSIS — C43.72 MALIGNANT MELANOMA OF LEFT LOWER LIMB, INCLUDING HIP: Chronic | ICD-10-CM

## 2023-03-23 DIAGNOSIS — Z98.89 OTHER SPECIFIED POSTPROCEDURAL STATES: Chronic | ICD-10-CM

## 2023-03-23 DIAGNOSIS — R22.32 LOCALIZED SWELLING, MASS AND LUMP, LEFT UPPER LIMB: ICD-10-CM

## 2023-03-23 DIAGNOSIS — C50.919 MALIGNANT NEOPLASM OF UNSPECIFIED SITE OF UNSPECIFIED FEMALE BREAST: ICD-10-CM

## 2023-03-23 DIAGNOSIS — Z98.890 OTHER SPECIFIED POSTPROCEDURAL STATES: Chronic | ICD-10-CM

## 2023-03-23 LAB
ALBUMIN SERPL ELPH-MCNC: 4.7 G/DL
ALP BLD-CCNC: 132 U/L
ALT SERPL-CCNC: 9 U/L
ANION GAP SERPL CALC-SCNC: 14 MMOL/L
AST SERPL-CCNC: 12 U/L
BASOPHILS # BLD AUTO: 0.04 K/UL — SIGNIFICANT CHANGE UP (ref 0–0.2)
BASOPHILS NFR BLD AUTO: 0.7 % — SIGNIFICANT CHANGE UP (ref 0–2)
BILIRUB SERPL-MCNC: 0.3 MG/DL
BUN SERPL-MCNC: 11 MG/DL
CALCIUM SERPL-MCNC: 9.8 MG/DL
CEA SERPL-MCNC: 1.2 NG/ML
CHLORIDE SERPL-SCNC: 106 MMOL/L
CO2 SERPL-SCNC: 23 MMOL/L
CREAT SERPL-MCNC: 0.86 MG/DL
EGFR: 73 ML/MIN/1.73M2
EOSINOPHIL # BLD AUTO: 0.04 K/UL — SIGNIFICANT CHANGE UP (ref 0–0.5)
EOSINOPHIL NFR BLD AUTO: 0.7 % — SIGNIFICANT CHANGE UP (ref 0–6)
GLUCOSE SERPL-MCNC: 99 MG/DL
HCT VFR BLD CALC: 38.4 % — SIGNIFICANT CHANGE UP (ref 34.5–45)
HGB BLD-MCNC: 12.2 G/DL — SIGNIFICANT CHANGE UP (ref 11.5–15.5)
IMM GRANULOCYTES NFR BLD AUTO: 0.2 % — SIGNIFICANT CHANGE UP (ref 0–0.9)
LYMPHOCYTES # BLD AUTO: 1.59 K/UL — SIGNIFICANT CHANGE UP (ref 1–3.3)
LYMPHOCYTES # BLD AUTO: 27.5 % — SIGNIFICANT CHANGE UP (ref 13–44)
MCHC RBC-ENTMCNC: 30.3 PG — SIGNIFICANT CHANGE UP (ref 27–34)
MCHC RBC-ENTMCNC: 31.8 G/DL — LOW (ref 32–36)
MCV RBC AUTO: 95.5 FL — SIGNIFICANT CHANGE UP (ref 80–100)
MONOCYTES # BLD AUTO: 0.35 K/UL — SIGNIFICANT CHANGE UP (ref 0–0.9)
MONOCYTES NFR BLD AUTO: 6 % — SIGNIFICANT CHANGE UP (ref 2–14)
NEUTROPHILS # BLD AUTO: 3.76 K/UL — SIGNIFICANT CHANGE UP (ref 1.8–7.4)
NEUTROPHILS NFR BLD AUTO: 64.9 % — SIGNIFICANT CHANGE UP (ref 43–77)
NRBC # BLD: 0 /100 WBCS — SIGNIFICANT CHANGE UP (ref 0–0)
PLATELET # BLD AUTO: 226 K/UL — SIGNIFICANT CHANGE UP (ref 150–400)
POTASSIUM SERPL-SCNC: 4.6 MMOL/L
PROT SERPL-MCNC: 7.4 G/DL
RBC # BLD: 4.02 M/UL — SIGNIFICANT CHANGE UP (ref 3.8–5.2)
RBC # FLD: 12.7 % — SIGNIFICANT CHANGE UP (ref 10.3–14.5)
SODIUM SERPL-SCNC: 143 MMOL/L
WBC # BLD: 5.79 K/UL — SIGNIFICANT CHANGE UP (ref 3.8–10.5)
WBC # FLD AUTO: 5.79 K/UL — SIGNIFICANT CHANGE UP (ref 3.8–10.5)

## 2023-03-23 PROCEDURE — 99213 OFFICE O/P EST LOW 20 MIN: CPT

## 2023-03-23 PROCEDURE — 76641 ULTRASOUND BREAST COMPLETE: CPT

## 2023-03-23 PROCEDURE — 76641 ULTRASOUND BREAST COMPLETE: CPT | Mod: 26,50

## 2023-03-23 NOTE — END OF VISIT
[Time Spent: ___ minutes] : I have spent [unfilled] minutes of time on the encounter. [FreeTextEntry3] : Patient being seen per physician's primary plan of care.

## 2023-03-23 NOTE — HISTORY OF PRESENT ILLNESS
[Disease: _____________________] : Disease: [unfilled] [de-identified] : She was initially diagnosed with breast cancer at age 52 with multifocal right breast cancer.  She had bilateral mastectomies and sentinel lymph node biopsy.  She had in the right breast a 1.5 cm infiltrating lobular carcinoma with negative sentinel lymph nodes.  The ER was 99%, WI was 99%, Tch1mvo was negative.  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  She received for Stage I breast cancer: AC followed by T dose dense from 6/2007 to 10/2007.  She was then placed on tamoxifen from 2007 and switched to anastrozole to complete 5 years of treatment on 12/1/2012.  On follow up in 2014, she had new palpable right breast finding and biopsy confirmed breast cancer.  On 12/3/14, she had right breast mass excision with axillary lymph node dissection.  The pathology showed invasive lobular carcinoma involving the dermis and superficial subcutaneous tissue along with 1 out of 15 lymph nodes involved by carcinoma.  The ER was 95%, WI was 95%, Cyb2pjf was CISH negative. She was started on letrozole. She had worsening back pain from spinal stenosis despite physical therapy. She had L4-5 posterior interbody fusion on 3/2017. She developed postoperative R calf DVT and was started initially on warfarin then Xarelto which was stopped after anticoagulation course and physical therapy to improve mobility.  [de-identified] : invasive lobular ER 95%, UT 95%, Jzj4mvl CISH negative [de-identified] : dd ACT 6/2007 to 10/2007\par tamoxifen then anastrozole 2007 to 12/2012\par Letrozole 1/2015 to present  [de-identified] : Since last visit, she recently noticed a "lump" near her left arm pit approximately 2 weeks ago while showering, however she is unsure how long is has been there. She denies new breast pain or chest wall changes. She has been tolerating the letrozole well and is no longer taking the venlafaxine to help with sleep. She is scheduled to have her DEXA scan in April. She denies headaches, cough, or back pain.

## 2023-03-23 NOTE — ASSESSMENT
[FreeTextEntry1] : She is a 66 y/o F with recurrent lobular right breast cancer that recurred 3 years off endocrine therapy. She presents today for an acute visit to evaluate a new left axillary nodule. She has been on letrozole since 1/2015 and continues to tolerate mediation well. We reviewed calcium and Vitamin D supplementation along with exercise to maintain bone health. She will have her interval bone density in April. Will order blood work to monitor tumor markers. Next follow up in 6 months but earlier if any new symptoms.\par \par Nodule left axilla: Will order breast US to evaluate. \par \par

## 2023-03-23 NOTE — PHYSICAL EXAM
[Restricted in physically strenuous activity but ambulatory and able to carry out work of a light or sedentary nature] : Status 1- Restricted in physically strenuous activity but ambulatory and able to carry out work of a light or sedentary nature, e.g., light house work, office work [Normal] : affect appropriate [de-identified] : facial droop chronic  [de-identified] : bilateral reconstruction with post surgical scar R axilla; left axillary nodule 5 x 3 cm  [de-identified] : ambulates with cane

## 2023-03-24 ENCOUNTER — NON-APPOINTMENT (OUTPATIENT)
Age: 68
End: 2023-03-24

## 2023-03-24 LAB — CANCER AG27-29 SERPL-ACNC: 10.6 U/ML

## 2023-04-03 ENCOUNTER — APPOINTMENT (OUTPATIENT)
Dept: RADIOLOGY | Facility: IMAGING CENTER | Age: 68
End: 2023-04-03
Payer: MEDICARE

## 2023-04-03 ENCOUNTER — OUTPATIENT (OUTPATIENT)
Dept: OUTPATIENT SERVICES | Facility: HOSPITAL | Age: 68
LOS: 1 days | End: 2023-04-03
Payer: COMMERCIAL

## 2023-04-03 DIAGNOSIS — Z98.890 OTHER SPECIFIED POSTPROCEDURAL STATES: Chronic | ICD-10-CM

## 2023-04-03 DIAGNOSIS — C50.911 MALIGNANT NEOPLASM OF UNSPECIFIED SITE OF RIGHT FEMALE BREAST: Chronic | ICD-10-CM

## 2023-04-03 DIAGNOSIS — Z85.3 PERSONAL HISTORY OF MALIGNANT NEOPLASM OF BREAST: ICD-10-CM

## 2023-04-03 DIAGNOSIS — Z79.811 LONG TERM (CURRENT) USE OF AROMATASE INHIBITORS: ICD-10-CM

## 2023-04-03 DIAGNOSIS — H50.9 UNSPECIFIED STRABISMUS: Chronic | ICD-10-CM

## 2023-04-03 DIAGNOSIS — C07 MALIGNANT NEOPLASM OF PAROTID GLAND: Chronic | ICD-10-CM

## 2023-04-03 DIAGNOSIS — C43.72 MALIGNANT MELANOMA OF LEFT LOWER LIMB, INCLUDING HIP: Chronic | ICD-10-CM

## 2023-04-03 DIAGNOSIS — Z98.89 OTHER SPECIFIED POSTPROCEDURAL STATES: Chronic | ICD-10-CM

## 2023-04-03 PROCEDURE — 77080 DXA BONE DENSITY AXIAL: CPT

## 2023-04-03 PROCEDURE — 77085 DXA BONE DENSITY AXL VRT FX: CPT | Mod: 26

## 2023-04-03 PROCEDURE — 77085 DXA BONE DENSITY AXL VRT FX: CPT

## 2023-04-21 ENCOUNTER — APPOINTMENT (OUTPATIENT)
Dept: HEMATOLOGY ONCOLOGY | Facility: CLINIC | Age: 68
End: 2023-04-21

## 2023-04-28 ENCOUNTER — APPOINTMENT (OUTPATIENT)
Dept: INFUSION THERAPY | Facility: HOSPITAL | Age: 68
End: 2023-04-28

## 2023-05-01 ENCOUNTER — NON-APPOINTMENT (OUTPATIENT)
Age: 68
End: 2023-05-01

## 2023-05-30 ENCOUNTER — APPOINTMENT (OUTPATIENT)
Dept: PLASTIC SURGERY | Facility: CLINIC | Age: 68
End: 2023-05-30
Payer: MEDICARE

## 2023-05-30 VITALS
SYSTOLIC BLOOD PRESSURE: 127 MMHG | OXYGEN SATURATION: 96 % | BODY MASS INDEX: 29.99 KG/M2 | WEIGHT: 180 LBS | TEMPERATURE: 97.9 F | DIASTOLIC BLOOD PRESSURE: 78 MMHG | HEIGHT: 65 IN | HEART RATE: 78 BPM

## 2023-05-30 PROCEDURE — 99213 OFFICE O/P EST LOW 20 MIN: CPT

## 2023-05-30 NOTE — PHYSICAL EXAM
[Normocephalic] : normocephalic [Atraumatic] : atraumatic [Supple] : supple [No Supraclavicular Adenopathy] : no supraclavicular adenopathy [Examined in the supine and seated position] : examined in the supine and seated position [No dominant masses] : no dominant masses in right breast  [No dominant masses] : no dominant masses left breast [No Axillary Lymphadenopathy] : no left axillary lymphadenopathy [No Edema] : no edema [No Rashes] : no rashes [No Ulceration] : no ulceration [EOMI] : extra ocular movement intact [Sclera nonicteric] : sclera nonicteric [de-identified] : S/P bilateral mastectomies with implants.  [de-identified] : No palpable axillary lipoma on today's exam.

## 2023-05-30 NOTE — HISTORY OF PRESENT ILLNESS
[FreeTextEntry1] : Patient is a 68 year old female here today for a follow up visit. \par She has a history of right breast cancer in 2007 (Stage I) s/p bilateral mastectomies, implants, chemotherapy\par 12/2014 She had a right breast cancer recurrence: s/p right wide excision, right axillary LN dissection. Pathology revealed invasive lobular carcinoma with 1 of 15 positive lymph nodes. ER+/IA+/HER2 Faith-\par She underwent post op XRT; no chemo \par 9/09/2022 Bilateral MRI: Right: Susceptibility artifact from a port is noted in the right superior chest. There is no suspicious enhancement in the right breast/chest wall. The implant is intact. No periimplant fluid collection.\par Left: Susceptibility artifact in the left chest wall is noted, with reported history of loop recorder.  There is no suspicious enhancement in the left breast/chest wall. The implant is intact. No periimplant fluid collection. \par Axilla/other: There is no significant axillary or internal mammary lymphadenopathy. Clinical follow up recommended. BI-RADS 1\par ONC: Dr. Tito Norton, on Letrozole, saw her 3/23/2023. She had felt a lump under her arm and sent her for an ultrasound.\par 3/23/2023 Left ultrasound: No suspicious solid mass.\par Well-circumscribed echogenic mass is visualized corresponding to the site of clinical concern measuring 3.8 x 2 x 5.2 cm. The appearance is compatible with lipoma.\par Clinical follow up recommended. BI-RADS 2\par Plastic Surgeon: Dr. Amos, saw him last year\par She denies any current breast concerns. She can no longer feel the lipoma in her left axilla.

## 2023-05-30 NOTE — CONSULT LETTER
[Dear  ___] : Dear  [unfilled], [Courtesy Letter:] : I had the pleasure of seeing your patient, [unfilled], in my office today. [Please see my note below.] : Please see my note below. [Sincerely,] : Sincerely, [DrJimi  ___] : Dr. LAWSON [FreeTextEntry3] : Tiffany Bustos, RPA-C\par Breast Surgery\par [address]\par [address]\[place] [address]\par (Phone) [phone]\par (Fax) [phone]

## 2023-05-30 NOTE — ASSESSMENT
[FreeTextEntry1] : H/O right breast cancer in 2007 (Stage I) s/p bilateral mastectomies, then recurrence 12/2014 s/p lumpectomy, axillary lymph node dissection and XRT\par No evidence of disease recurrence on CBE \par \par 1. Bilateral breast MRI (implants) due 3/2025\par 2. Follow up office visit due in 1 year\par 3. Advised monthly self breast examinations and advised her to contact me if she has any concerns.

## 2023-06-05 ENCOUNTER — OUTPATIENT (OUTPATIENT)
Dept: OUTPATIENT SERVICES | Facility: HOSPITAL | Age: 68
LOS: 1 days | Discharge: HOME | End: 2023-06-05

## 2023-06-05 DIAGNOSIS — Z98.890 OTHER SPECIFIED POSTPROCEDURAL STATES: Chronic | ICD-10-CM

## 2023-06-05 DIAGNOSIS — C50.911 MALIGNANT NEOPLASM OF UNSPECIFIED SITE OF RIGHT FEMALE BREAST: Chronic | ICD-10-CM

## 2023-06-05 DIAGNOSIS — Z98.89 OTHER SPECIFIED POSTPROCEDURAL STATES: Chronic | ICD-10-CM

## 2023-06-05 DIAGNOSIS — C50.919 MALIGNANT NEOPLASM OF UNSPECIFIED SITE OF UNSPECIFIED FEMALE BREAST: ICD-10-CM

## 2023-06-05 DIAGNOSIS — C43.72 MALIGNANT MELANOMA OF LEFT LOWER LIMB, INCLUDING HIP: Chronic | ICD-10-CM

## 2023-06-05 DIAGNOSIS — H50.9 UNSPECIFIED STRABISMUS: Chronic | ICD-10-CM

## 2023-06-05 DIAGNOSIS — C07 MALIGNANT NEOPLASM OF PAROTID GLAND: Chronic | ICD-10-CM

## 2023-06-15 ENCOUNTER — APPOINTMENT (OUTPATIENT)
Dept: INTERNAL MEDICINE | Facility: CLINIC | Age: 68
End: 2023-06-15
Payer: MEDICARE

## 2023-06-15 VITALS
HEART RATE: 81 BPM | OXYGEN SATURATION: 96 % | WEIGHT: 180 LBS | HEIGHT: 65 IN | BODY MASS INDEX: 29.99 KG/M2 | DIASTOLIC BLOOD PRESSURE: 70 MMHG | SYSTOLIC BLOOD PRESSURE: 110 MMHG

## 2023-06-15 DIAGNOSIS — Z85.820 PERSONAL HISTORY OF MALIGNANT MELANOMA OF SKIN: ICD-10-CM

## 2023-06-15 PROCEDURE — 99213 OFFICE O/P EST LOW 20 MIN: CPT

## 2023-06-15 RX ORDER — MELOXICAM 7.5 MG/1
7.5 TABLET ORAL
Qty: 30 | Refills: 0 | Status: DISCONTINUED | COMMUNITY
Start: 2021-06-30 | End: 2023-06-15

## 2023-06-15 RX ORDER — ZOSTER VACCINE RECOMBINANT, ADJUVANTED 50 MCG/0.5
50 KIT INTRAMUSCULAR
Qty: 1 | Refills: 1 | Status: DISCONTINUED | COMMUNITY
Start: 2022-06-21 | End: 2023-06-15

## 2023-06-15 RX ORDER — ZOSTER VACCINE RECOMBINANT, ADJUVANTED 50 MCG/0.5
50 KIT INTRAMUSCULAR
Qty: 1 | Refills: 1 | Status: DISCONTINUED | COMMUNITY
Start: 2022-12-15 | End: 2023-06-15

## 2023-06-15 RX ORDER — ZOSTER VACCINE RECOMBINANT, ADJUVANTED 50 MCG/0.5
50 KIT INTRAMUSCULAR
Qty: 1 | Refills: 0 | Status: ACTIVE | COMMUNITY
Start: 2023-06-15 | End: 1900-01-01

## 2023-06-15 RX ORDER — LETROZOLE TABLETS 2.5 MG/1
2.5 TABLET, FILM COATED ORAL
Refills: 0 | Status: DISCONTINUED | COMMUNITY
End: 2023-06-15

## 2023-06-15 NOTE — PLAN
[FreeTextEntry1] : 69 y/o F h/o breast CA, melanona, HTN, HLD< AICD placement, PVD here for f/u\par HTN: Well controlled, c/w amlodipine\par HLD: on statin, lft's wnl. Will check flp at next visit; c/w atorvastatin\par HCM: Colon utd, Rx for Shingrix eprescribed (had one shot so far)\par No longer gets mammo, DEXA utd; she will f/u gyn\par Onc: Follows with Dr. Esqueda and Dr. Norton\par rto for AWV 3 mos

## 2023-06-15 NOTE — PHYSICAL EXAM
[No Acute Distress] : no acute distress [Well Nourished] : well nourished [Well Developed] : well developed [Normal Sclera/Conjunctiva] : normal sclera/conjunctiva [Normal Outer Ear/Nose] : the outer ears and nose were normal in appearance [Normal Oropharynx] : the oropharynx was normal [Normal TMs] : both tympanic membranes were normal [No JVD] : no jugular venous distention [No Lymphadenopathy] : no lymphadenopathy [Supple] : supple [No Respiratory Distress] : no respiratory distress  [No Accessory Muscle Use] : no accessory muscle use [Clear to Auscultation] : lungs were clear to auscultation bilaterally [No Edema] : there was no peripheral edema [Soft] : abdomen soft [Non Tender] : non-tender [No HSM] : no HSM [No CVA Tenderness] : no CVA  tenderness

## 2023-06-15 NOTE — HISTORY OF PRESENT ILLNESS
[de-identified] : 69 y/o F here for f/u\par Had loop recorder removed at [place]- had it in place for about 5 years\par Able to walk with brace\par Heading for a cruise to [facility] and later this year to [place]\par Feels well\par No cp, sob, palpitations

## 2023-06-16 ENCOUNTER — APPOINTMENT (OUTPATIENT)
Dept: INFUSION THERAPY | Facility: HOSPITAL | Age: 68
End: 2023-06-16

## 2023-06-30 ENCOUNTER — APPOINTMENT (OUTPATIENT)
Dept: HEMATOLOGY ONCOLOGY | Facility: CLINIC | Age: 68
End: 2023-06-30
Payer: MEDICARE

## 2023-06-30 VITALS
DIASTOLIC BLOOD PRESSURE: 62 MMHG | WEIGHT: 174.6 LBS | RESPIRATION RATE: 16 BRPM | TEMPERATURE: 97.2 F | OXYGEN SATURATION: 97 % | BODY MASS INDEX: 29.06 KG/M2 | HEART RATE: 92 BPM | SYSTOLIC BLOOD PRESSURE: 90 MMHG

## 2023-06-30 PROCEDURE — 99214 OFFICE O/P EST MOD 30 MIN: CPT

## 2023-06-30 NOTE — ASSESSMENT
[FreeTextEntry1] : She is a 67 y/o F with recurrent lobular right breast cancer that recurred 3 years off endocrine therapy. She has been on letrozole since 1/2015. We reviewed signs and symptoms of breast cancer recurrence. No new signs or symptoms of recurrence. Tumor markers remains WNL. She continues to maintain port: next flush for 8/2023. Next follow up in 6 months but earlier if any new symptoms.\par \par Leg swelling: reviewed leg elevation, limiting salt intake in foods, changing compression stockings every few months to maintain efficacy of stockings. Reviewed if swelling over ankles continues: she will s/w PCP regarding amlodipine and swelling as SE. Reviewed occasional use of diuretic as long as BP is good and no dizziness. She will take with potassium containing foods. Will add BNP with August blood work given history of doxorubicin over 20 years ago.

## 2023-06-30 NOTE — REVIEW OF SYSTEMS
[Chest Pain] : no chest pain [Palpitations] : no palpitations [Leg Claudication] : no intermittent leg claudication [Lower Ext Edema] : lower extremity edema [Diarrhea: Grade 0] : Diarrhea: Grade 0 [Joint Pain] : no joint pain [Joint Stiffness] : no joint stiffness [Muscle Pain] : no muscle pain [Muscle Weakness] : no muscle weakness [Confused] : no confusion [Dizziness] : no dizziness [Fainting] : no fainting [Difficulty Walking] : difficulty walking [Negative] : Allergic/Immunologic [de-identified] : walks with cane and leg splint to prevent foot drop

## 2023-06-30 NOTE — HISTORY OF PRESENT ILLNESS
[Disease: _____________________] : Disease: [unfilled] [de-identified] : She was initially diagnosed with breast cancer at age 52 with multifocal right breast cancer.  She had bilateral mastectomies and sentinel lymph node biopsy.  She had in the right breast a 1.5 cm infiltrating lobular carcinoma with negative sentinel lymph nodes.  The ER was 99%, ID was 99%, Tvb4fsi was negative.  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  She received for Stage I breast cancer: AC followed by T dose dense from 6/2007 to 10/2007.  She was then placed on tamoxifen from 2007 and switched to anastrozole to complete 5 years of treatment on 12/1/2012.  On follow up in 2014, she had new palpable right breast finding and biopsy confirmed breast cancer.  On 12/3/14, she had right breast mass excision with axillary lymph node dissection.  The pathology showed invasive lobular carcinoma involving the dermis and superficial subcutaneous tissue along with 1 out of 15 lymph nodes involved by carcinoma.  The ER was 95%, ID was 95%, Byb4hfx was CISH negative. She was started on letrozole. She had worsening back pain from spinal stenosis despite physical therapy. She had L4-5 posterior interbody fusion on 3/2017. She developed postoperative R calf DVT and was started initially on warfarin then Xarelto which was stopped after anticoagulation course and physical therapy to improve mobility.  [de-identified] : invasive lobular ER 95%, MS 95%, Ped5nrb CISH negative [de-identified] : dd ACT 6/2007 to 10/2007\par tamoxifen then anastrozole 2007 to 12/2012\par Letrozole 1/2015 to present  [de-identified] : She has been noticing leg swelling: notices worse after walking: improved today. She feels ankle swelling is B and when she walks entire day: notices swelling up to the knee. Denies any SOB. Wondering what she can do since she will be going on trip around [place] in September/ October. She wears compression stockings but has not gotten new ones. Denies any palpitations or dizziness. No new medications. Currently has a cold: was in bed x 2 days but currently feeling better. Denies any new breast pain or chest wall changes. No back pain or HA.\par

## 2023-06-30 NOTE — PHYSICAL EXAM
[Restricted in physically strenuous activity but ambulatory and able to carry out work of a light or sedentary nature] : Status 1- Restricted in physically strenuous activity but ambulatory and able to carry out work of a light or sedentary nature, e.g., light house work, office work [Normal] : affect appropriate [de-identified] : facial droop chronic  [de-identified] : bilateral reconstruction with post surgical scar R axilla  [de-identified] : ambulating with rolling walker; no pitting edema noted today: pt off amlodipine x 2 days due to cold

## 2023-07-31 ENCOUNTER — RX RENEWAL (OUTPATIENT)
Age: 68
End: 2023-07-31

## 2023-08-18 ENCOUNTER — NON-APPOINTMENT (OUTPATIENT)
Age: 68
End: 2023-08-18

## 2023-08-18 ENCOUNTER — APPOINTMENT (OUTPATIENT)
Dept: OPHTHALMOLOGY | Facility: CLINIC | Age: 68
End: 2023-08-18
Payer: MEDICARE

## 2023-08-18 PROCEDURE — 92015 DETERMINE REFRACTIVE STATE: CPT

## 2023-08-18 PROCEDURE — 92012 INTRM OPH EXAM EST PATIENT: CPT

## 2023-08-23 ENCOUNTER — OUTPATIENT (OUTPATIENT)
Dept: OUTPATIENT SERVICES | Facility: HOSPITAL | Age: 68
LOS: 1 days | Discharge: HOME | End: 2023-08-23

## 2023-08-23 DIAGNOSIS — C07 MALIGNANT NEOPLASM OF PAROTID GLAND: Chronic | ICD-10-CM

## 2023-08-23 DIAGNOSIS — Z98.890 OTHER SPECIFIED POSTPROCEDURAL STATES: Chronic | ICD-10-CM

## 2023-08-23 DIAGNOSIS — C50.911 MALIGNANT NEOPLASM OF UNSPECIFIED SITE OF RIGHT FEMALE BREAST: Chronic | ICD-10-CM

## 2023-08-23 DIAGNOSIS — C50.919 MALIGNANT NEOPLASM OF UNSPECIFIED SITE OF UNSPECIFIED FEMALE BREAST: ICD-10-CM

## 2023-08-23 DIAGNOSIS — H50.9 UNSPECIFIED STRABISMUS: Chronic | ICD-10-CM

## 2023-08-23 DIAGNOSIS — C43.72 MALIGNANT MELANOMA OF LEFT LOWER LIMB, INCLUDING HIP: Chronic | ICD-10-CM

## 2023-08-23 DIAGNOSIS — Z98.89 OTHER SPECIFIED POSTPROCEDURAL STATES: Chronic | ICD-10-CM

## 2023-08-25 ENCOUNTER — APPOINTMENT (OUTPATIENT)
Dept: INFUSION THERAPY | Facility: HOSPITAL | Age: 68
End: 2023-08-25

## 2023-09-28 ENCOUNTER — LABORATORY RESULT (OUTPATIENT)
Age: 68
End: 2023-09-28

## 2023-09-28 ENCOUNTER — APPOINTMENT (OUTPATIENT)
Dept: INTERNAL MEDICINE | Facility: CLINIC | Age: 68
End: 2023-09-28
Payer: MEDICARE

## 2023-09-28 ENCOUNTER — OUTPATIENT (OUTPATIENT)
Dept: OUTPATIENT SERVICES | Facility: HOSPITAL | Age: 68
LOS: 1 days | End: 2023-09-28
Payer: COMMERCIAL

## 2023-09-28 VITALS
HEART RATE: 77 BPM | BODY MASS INDEX: 28.32 KG/M2 | SYSTOLIC BLOOD PRESSURE: 110 MMHG | WEIGHT: 170 LBS | HEIGHT: 65 IN | OXYGEN SATURATION: 97 % | DIASTOLIC BLOOD PRESSURE: 60 MMHG

## 2023-09-28 DIAGNOSIS — C43.72 MALIGNANT MELANOMA OF LEFT LOWER LIMB, INCLUDING HIP: Chronic | ICD-10-CM

## 2023-09-28 DIAGNOSIS — Z98.890 OTHER SPECIFIED POSTPROCEDURAL STATES: Chronic | ICD-10-CM

## 2023-09-28 DIAGNOSIS — H50.9 UNSPECIFIED STRABISMUS: Chronic | ICD-10-CM

## 2023-09-28 DIAGNOSIS — Z98.89 OTHER SPECIFIED POSTPROCEDURAL STATES: Chronic | ICD-10-CM

## 2023-09-28 DIAGNOSIS — R52 PAIN, UNSPECIFIED: ICD-10-CM

## 2023-09-28 DIAGNOSIS — M79.89 OTHER SPECIFIED SOFT TISSUE DISORDERS: ICD-10-CM

## 2023-09-28 DIAGNOSIS — I10 ESSENTIAL (PRIMARY) HYPERTENSION: ICD-10-CM

## 2023-09-28 DIAGNOSIS — Z23 ENCOUNTER FOR IMMUNIZATION: ICD-10-CM

## 2023-09-28 DIAGNOSIS — C50.911 MALIGNANT NEOPLASM OF UNSPECIFIED SITE OF RIGHT FEMALE BREAST: Chronic | ICD-10-CM

## 2023-09-28 DIAGNOSIS — C07 MALIGNANT NEOPLASM OF PAROTID GLAND: Chronic | ICD-10-CM

## 2023-09-28 DIAGNOSIS — R55 SYNCOPE AND COLLAPSE: ICD-10-CM

## 2023-09-28 PROCEDURE — G0439: CPT

## 2023-09-28 PROCEDURE — G0008: CPT

## 2023-09-28 RX ORDER — FUROSEMIDE 20 MG/1
20 TABLET ORAL DAILY
Qty: 10 | Refills: 0 | Status: DISCONTINUED | COMMUNITY
Start: 2023-06-30 | End: 2023-09-28

## 2023-09-28 RX ORDER — AMLODIPINE BESYLATE 10 MG/1
10 TABLET ORAL DAILY
Qty: 1 | Refills: 3 | Status: DISCONTINUED | COMMUNITY
Start: 2021-08-19 | End: 2023-09-28

## 2023-10-02 DIAGNOSIS — R73.09 OTHER ABNORMAL GLUCOSE: ICD-10-CM

## 2023-10-02 DIAGNOSIS — Z79.811 LONG TERM (CURRENT) USE OF AROMATASE INHIBITORS: ICD-10-CM

## 2023-10-02 DIAGNOSIS — R55 SYNCOPE AND COLLAPSE: ICD-10-CM

## 2023-10-02 DIAGNOSIS — Z23 ENCOUNTER FOR IMMUNIZATION: ICD-10-CM

## 2023-10-02 DIAGNOSIS — Z00.00 ENCOUNTER FOR GENERAL ADULT MEDICAL EXAMINATION WITHOUT ABNORMAL FINDINGS: ICD-10-CM

## 2023-10-25 ENCOUNTER — OUTPATIENT (OUTPATIENT)
Dept: OUTPATIENT SERVICES | Facility: HOSPITAL | Age: 68
LOS: 1 days | Discharge: HOME | End: 2023-10-25

## 2023-10-25 DIAGNOSIS — H50.9 UNSPECIFIED STRABISMUS: Chronic | ICD-10-CM

## 2023-10-25 DIAGNOSIS — Z98.890 OTHER SPECIFIED POSTPROCEDURAL STATES: Chronic | ICD-10-CM

## 2023-10-25 DIAGNOSIS — C50.911 MALIGNANT NEOPLASM OF UNSPECIFIED SITE OF RIGHT FEMALE BREAST: Chronic | ICD-10-CM

## 2023-10-25 DIAGNOSIS — C50.919 MALIGNANT NEOPLASM OF UNSPECIFIED SITE OF UNSPECIFIED FEMALE BREAST: ICD-10-CM

## 2023-10-25 DIAGNOSIS — Z98.89 OTHER SPECIFIED POSTPROCEDURAL STATES: Chronic | ICD-10-CM

## 2023-10-25 DIAGNOSIS — C43.72 MALIGNANT MELANOMA OF LEFT LOWER LIMB, INCLUDING HIP: Chronic | ICD-10-CM

## 2023-10-25 DIAGNOSIS — C07 MALIGNANT NEOPLASM OF PAROTID GLAND: Chronic | ICD-10-CM

## 2023-10-27 ENCOUNTER — APPOINTMENT (OUTPATIENT)
Dept: INFUSION THERAPY | Facility: HOSPITAL | Age: 68
End: 2023-10-27

## 2023-11-07 ENCOUNTER — APPOINTMENT (OUTPATIENT)
Dept: OTOLARYNGOLOGY | Facility: CLINIC | Age: 68
End: 2023-11-07
Payer: MEDICARE

## 2023-11-07 VITALS
WEIGHT: 180 LBS | DIASTOLIC BLOOD PRESSURE: 83 MMHG | SYSTOLIC BLOOD PRESSURE: 134 MMHG | HEART RATE: 90 BPM | HEIGHT: 65 IN | BODY MASS INDEX: 29.99 KG/M2

## 2023-11-07 DIAGNOSIS — Z78.9 OTHER SPECIFIED HEALTH STATUS: ICD-10-CM

## 2023-11-07 PROCEDURE — 69210 REMOVE IMPACTED EAR WAX UNI: CPT

## 2023-11-07 PROCEDURE — 99213 OFFICE O/P EST LOW 20 MIN: CPT | Mod: 25

## 2023-11-07 RX ORDER — VITAMIN B COMPLEX
CAPSULE ORAL
Refills: 0 | Status: ACTIVE | COMMUNITY

## 2023-12-07 ENCOUNTER — RX RENEWAL (OUTPATIENT)
Age: 68
End: 2023-12-07

## 2023-12-13 ENCOUNTER — OUTPATIENT (OUTPATIENT)
Dept: OUTPATIENT SERVICES | Facility: HOSPITAL | Age: 68
LOS: 1 days | Discharge: HOME | End: 2023-12-13

## 2023-12-13 DIAGNOSIS — C50.919 MALIGNANT NEOPLASM OF UNSPECIFIED SITE OF UNSPECIFIED FEMALE BREAST: ICD-10-CM

## 2023-12-13 DIAGNOSIS — Z98.890 OTHER SPECIFIED POSTPROCEDURAL STATES: Chronic | ICD-10-CM

## 2023-12-13 DIAGNOSIS — C07 MALIGNANT NEOPLASM OF PAROTID GLAND: Chronic | ICD-10-CM

## 2023-12-13 DIAGNOSIS — C50.911 MALIGNANT NEOPLASM OF UNSPECIFIED SITE OF RIGHT FEMALE BREAST: Chronic | ICD-10-CM

## 2023-12-13 DIAGNOSIS — Z98.89 OTHER SPECIFIED POSTPROCEDURAL STATES: Chronic | ICD-10-CM

## 2023-12-13 DIAGNOSIS — C43.72 MALIGNANT MELANOMA OF LEFT LOWER LIMB, INCLUDING HIP: Chronic | ICD-10-CM

## 2023-12-13 DIAGNOSIS — H50.9 UNSPECIFIED STRABISMUS: Chronic | ICD-10-CM

## 2023-12-17 PROBLEM — C43.72: Status: ACTIVE | Noted: 2023-12-17

## 2023-12-18 ENCOUNTER — APPOINTMENT (OUTPATIENT)
Dept: SURGICAL ONCOLOGY | Facility: CLINIC | Age: 68
End: 2023-12-18
Payer: MEDICARE

## 2023-12-18 ENCOUNTER — APPOINTMENT (OUTPATIENT)
Dept: INFUSION THERAPY | Facility: HOSPITAL | Age: 68
End: 2023-12-18

## 2023-12-18 VITALS
OXYGEN SATURATION: 97 % | DIASTOLIC BLOOD PRESSURE: 68 MMHG | WEIGHT: 178 LBS | BODY MASS INDEX: 29.66 KG/M2 | SYSTOLIC BLOOD PRESSURE: 102 MMHG | RESPIRATION RATE: 16 BRPM | HEIGHT: 65 IN | HEART RATE: 86 BPM

## 2023-12-18 DIAGNOSIS — C43.72 MALIGNANT MELANOMA OF LEFT LOWER LIMB, INCLUDING HIP: ICD-10-CM

## 2023-12-18 PROCEDURE — 99214 OFFICE O/P EST MOD 30 MIN: CPT

## 2023-12-19 NOTE — ASSESSMENT
[FreeTextEntry1] : 68-year-old lady.  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  + History of bilateral, metachronous breast cancers.  Today she is asymptomatic with a normal physical examination.  Presently, no imaging studies are warranted from our perspective.  I have offered to continue to see her annually, sooner if needed.  Reviewed in detail, all questions answered.

## 2023-12-19 NOTE — HISTORY OF PRESENT ILLNESS
[de-identified] : 68-year-old lady.  July 2008: Wide excision of a melanoma in situ of the LEFT FOOT, on the plantar aspect, with negative margins, and reconstruction by Dr. Paul HUSSEIN.  No relatives with melanoma.   July 2021, she had a TIA manifest with problems with balance. She was hospitalized at [facility] and diagnosed with a hypertensive crisis which required 1 week of treatment, prior to discharge.  She had spinal stenosis surgery, 2017, by Dr. Jamie Mullins at [place]. ***Her postoperative course was complicated by a DVT***  Hospn in April 2016 @ [facility] with severe viral infection.   She had a right parotidectomy for PAROTID CANCER in 2004 by Dr. Jamie Taveras, followed by radiation therapy. She follows up with Dr. James Tompkins for a left parapharyngeal mass.   In 1977 she had Lumpectomy for a MALIGNANT PHYLLODES tumor of the RIGHT breast. A left breast biopsy in 2000 was benign. In April 2007 she had a right mastectomy for BREAST CANCER, and a prophylactic left mastectomy, by Dr. Susan Palleschi, and reconstruction by Dr. Roy. She had a follow-up with breast surgery in 2021 In December 2014 she had surgery for recurrent right breast cancer, followed by radiation therapy by Dr.Beatrice Pope. Her hematologist was Dr. Metz, now Dr. Tito HOPPER at the [facility] Chemotherapy in 2007. Tamoxifen and anastrozole, [id number]. Letrozole from January 2015 to presently, October 2022 visit, no worrisome findings   Her dermatologist is Dr. Caron RUIZ. February 2024 visit is scheduled.  Her internist is Dr. Terrie JEROME.  She does not have a pacemaker or defibrillator. +LOOP-RECORDER, , After a syncopal episode, Summer 2019,  Cardiology is at [facility].  Fall 2023 she was hospitalized at [facility] with dehydration.  She takes baby aspirin daily.  + Hypertension. Treated with amlodipine.  July 2021, she had a TIA manifest with problems with balance. She was hospitalized at [facility] and diagnosed with a hypertensive crisis which required 1 week of treatment, prior to discharge.  Medications: Omeprazole for GERD.  Letrozole as adjuvant treatment for her recurrent breast cancer.  For chronic pain management she is on gabapentin for neuropathy;  Neurology: Dr. Rajwinder FLETCHER  She had spinal stenosis surgery, 2017, by Dr. Jamie Mullins at [facility]. ***Her postoperative course was complicated by a DVT***  She has seen Dr. Luis Snowden from vascular surgery for arterial lower extremity insufficiency. She's been treated with exercise, and cilostazol (plental).  Rheumatology: Dr. Janet WEST   Her gynecologist is Dr. Rosalba GUTIERREZ February 2021 visit was unremarkable. Was referred to Dr. Serena Oreilly for consideration of hysterectomy for fibroids, no operation recommended presently. September 2021 D&C was normal.  I suggested a follow-up GYN visit presently   Eye examination. August 2023: Dr. Morgan PEREIRA. No worrisome findings.   She has had bilateral mastectomies.   Colonoscopy was repeated in April 2022 by Dr. Sarah Kuo: Demetri x5 years

## 2023-12-19 NOTE — REASON FOR VISIT
[Follow-Up Visit] : a follow-up visit for [Other: _____] : [unfilled] [FreeTextEntry2] : Left foot, plantar melanoma, excised July 2008

## 2023-12-19 NOTE — REVIEW OF SYSTEMS
[Negative] : Endocrine [FreeTextEntry5] : History of DVT [FreeTextEntry8] : Reflux [de-identified] : Spinal stenosis [de-identified] : History of melanoma [de-identified] : History of TIA [FreeTextEntry1] : History of breast cancer

## 2023-12-29 ENCOUNTER — APPOINTMENT (OUTPATIENT)
Dept: HEMATOLOGY ONCOLOGY | Facility: CLINIC | Age: 68
End: 2023-12-29
Payer: MEDICARE

## 2023-12-29 VITALS
WEIGHT: 177.47 LBS | SYSTOLIC BLOOD PRESSURE: 117 MMHG | RESPIRATION RATE: 16 BRPM | TEMPERATURE: 97.3 F | OXYGEN SATURATION: 97 % | HEART RATE: 73 BPM | BODY MASS INDEX: 29.53 KG/M2 | DIASTOLIC BLOOD PRESSURE: 80 MMHG

## 2023-12-29 PROCEDURE — 99214 OFFICE O/P EST MOD 30 MIN: CPT

## 2023-12-29 RX ORDER — ACETAMINOPHEN/DIPHENHYDRAMINE 500MG-25MG
TABLET ORAL
Refills: 0 | Status: DISCONTINUED | COMMUNITY
End: 2023-12-29

## 2023-12-29 RX ORDER — MELOXICAM 10 MG/1
CAPSULE ORAL
Refills: 0 | Status: DISCONTINUED | COMMUNITY
End: 2023-12-29

## 2023-12-29 RX ORDER — DULOXETINE HYDROCHLORIDE 30 MG/1
CAPSULE, DELAYED RELEASE ORAL
Refills: 0 | Status: DISCONTINUED | COMMUNITY
End: 2023-12-29

## 2023-12-29 NOTE — ASSESSMENT
[FreeTextEntry1] : She is a 67 y/o F with recurrent lobular right breast cancer that recurred 3 years off endocrine therapy. She has been on letrozole since 1/2015. We reviewed signs and symptoms of breast cancer recurrence. No new signs or symptoms of recurrence. Will obtain work up for syncopal event from [facility] for CT imaging and Echo. She will follow up with PCP. We reviewed calcium and Vitamin D supplementation along with exercise to maintain bone health: last bone density done 2023. She will continue with exercise and low fat diet as tolerated. Next follow up in 6 months but earlier if any new symptoms.

## 2023-12-29 NOTE — HISTORY OF PRESENT ILLNESS
[Disease: _____________________] : Disease: [unfilled] [de-identified] : She was initially diagnosed with breast cancer at age 52 with multifocal right breast cancer.  She had bilateral mastectomies and sentinel lymph node biopsy.  She had in the right breast a 1.5 cm infiltrating lobular carcinoma with negative sentinel lymph nodes.  The ER was 99%, [place] was 99%, [id number] was negative.  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  She received for Stage I breast cancer: AC followed by T dose dense from 6/2007 to 10/2007.  She was then placed on tamoxifen from 2007 and switched to anastrozole to complete 5 years of treatment on 12/1/2012.  On follow up in 2014, she had new palpable right breast finding and biopsy confirmed breast cancer.  On 12/3/14, she had right breast mass excision with axillary lymph node dissection.  The pathology showed invasive lobular carcinoma involving the dermis and superficial subcutaneous tissue along with 1 out of 15 lymph nodes involved by carcinoma.  The ER was 95%, [place] was 95%, Pfx8mgp was CISH negative. She was started on letrozole. She had worsening back pain from spinal stenosis despite physical therapy. She had L4-5 posterior interbody fusion on 3/2017. She developed postoperative R calf DVT and was started initially on warfarin then Xarelto which was stopped after anticoagulation course and physical therapy to improve mobility.  [de-identified] : invasive lobular ER 95%, IA 95%, Jri7zea CISH negative [de-identified] : dd ACT 6/2007 to 10/2007 tamoxifen then anastrozole 2007 to 12/2012 Letrozole 1/2015 to present  [de-identified] : She had syncopal event 9/26/2023: was doing chores and getting ready for cruise to [place] and then fainted. She went to [facility] ED and had Echo, EKG, CT, blood work and reportedly negative and discharged. She enjoyed her cruise in October. Feels it was because she did not eat with her medications and fainted. Denies any further episodes. Denies any new breast pain or chest wall changes. No back pain, cough or HA. Continues to flush port. Tolerating letrozole without any issues.

## 2023-12-29 NOTE — PHYSICAL EXAM
[Restricted in physically strenuous activity but ambulatory and able to carry out work of a light or sedentary nature] : Status 1- Restricted in physically strenuous activity but ambulatory and able to carry out work of a light or sedentary nature, e.g., light house work, office work [Normal] : affect appropriate [de-identified] : facial droop chronic  [de-identified] : port site over the R chest wall C/D/I  [de-identified] : bilateral reconstruction with post surgical scar R axilla  [de-identified] : ambulating with cane

## 2023-12-29 NOTE — REVIEW OF SYSTEMS
[Diarrhea: Grade 0] : Diarrhea: Grade 0 [Negative] : Allergic/Immunologic [Confused] : no confusion [Dizziness] : no dizziness [Fainting] : no fainting [Difficulty Walking] : no difficulty walking [de-identified] : walks with the crutch due to support over the RLE

## 2024-01-16 ENCOUNTER — APPOINTMENT (OUTPATIENT)
Dept: VASCULAR SURGERY | Facility: CLINIC | Age: 69
End: 2024-01-16
Payer: MEDICARE

## 2024-01-16 VITALS
HEART RATE: 84 BPM | WEIGHT: 180 LBS | TEMPERATURE: 97.9 F | SYSTOLIC BLOOD PRESSURE: 133 MMHG | BODY MASS INDEX: 29.99 KG/M2 | DIASTOLIC BLOOD PRESSURE: 83 MMHG | HEIGHT: 65 IN

## 2024-01-16 DIAGNOSIS — G47.62 SLEEP RELATED LEG CRAMPS: ICD-10-CM

## 2024-01-16 DIAGNOSIS — R25.2 CRAMP AND SPASM: ICD-10-CM

## 2024-01-16 PROCEDURE — 99214 OFFICE O/P EST MOD 30 MIN: CPT

## 2024-01-16 PROCEDURE — 93923 UPR/LXTR ART STDY 3+ LVLS: CPT

## 2024-01-16 RX ORDER — CILOSTAZOL 50 MG/1
50 TABLET ORAL
Qty: 180 | Refills: 3 | Status: ACTIVE | COMMUNITY
Start: 2024-01-16 | End: 1900-01-01

## 2024-01-16 NOTE — PHYSICAL EXAM
[Normal Breath Sounds] : Normal breath sounds [2+] : left 2+ [Right Carotid Bruit] : right carotid bruit heard [Left Carotid Bruit] : left carotid bruit heard [1+] : left 1+ [Ankle Swelling (On Exam)] : present [Ankle Swelling Bilaterally] : bilaterally  [Varicose Veins Of Lower Extremities] : bilaterally [] : bilaterally [Ankle Swelling On The Right] : mild [No Rash or Lesion] : No rash or lesion [Alert] : alert [Oriented to Person] : oriented to person [Oriented to Place] : oriented to place [Oriented to Time] : oriented to time [Calm] : calm [JVD] : no jugular venous distention  [de-identified] : nad [de-identified] : wnl [de-identified] : no resp distress [FreeTextEntry1] : mild to mod art insuff w mod trophic skin changes  mild venous insufff w mild  thi le edema and mild st dermatitis from knee distally  multiple small v veins and spider v thi shins and ankles 1-2 mm  [de-identified] : wml [de-identified] : cn 2-12 thi grossly intact  [de-identified] : cooperative

## 2024-01-16 NOTE — HISTORY OF PRESENT ILLNESS
[FreeTextEntry1] : pt is s/p spinal stenosis surgery pt is receiving rehab\par  since this surgery pt c/o thi foot numbness \par  pt c/o nocturnal leg and foot cramps 1-2x/week\par  intensity  mod to severe and currently worsening \par  worse w lying in bed \par  onset sev years ago w recent worsening \par  pt states that she is in wheelchair and transfers  and can only ambulate w walker w difficulty  [de-identified] : Pt states less thi  leg and foot nocturnal cramps from previous visit \par  pt is currently on pletal 50 bid \par  pt states no cerebrovasc c/o \par  \par

## 2024-01-16 NOTE — DATA REVIEWED
[FreeTextEntry1] : 6/20/2018 AID patent AA and damien iliac arterial systems  no sig stenosis  6/20/2018 BELEM/PVR mild to mod infragenic art insuff w vessel calcification  Rt BELEM 1.21 lt BELEM 1.17  7/2/2019 BELEM/PVR mild to mod infragenic art insuff w vessel calcification  Rt BELEM 1.19 lt BELEM 1.19  12/22/2020 BELEM/PVR mild to mod infragenic art insuff w vessel calcification  Rt BELEM 1.28 lt BELEM 1.23  1/21/2021 Carotid Duplex  Rt ICA  less 50% stenosis (179/66) by velocity criteria                          Lt  ICA  less 50% stenosis (109/54) by velocity criteria                          Damien Ant Vertebral Arterial Flow   12/21/2021  BELEM/PVR mild to mod infragenic art insuff w vessel calcification  Rt BELEM 1.27 lt BELEM 1.24  1/18/2022 Carotid Duplex  Rt ICA  less 50% stenosis                           Lt  ICA  less 50% stenosis                           Damien Ant Vertebral Arterial Flow   12/27/2022   BELEM/PVR mild to mod infragenic art insuff w vessel calcification                             Rt BELEM 1.10 lt BELEM 1.19  3/14/2023 Carotid Duplex  Rt ICA  less 50% stenosis                           Lt  ICA  less 50% stenosis                           Damien Ant Vertebral Arterial Flow   1/16/2024  BELEM/PVR RLE mild  infragenic art insuff w vessel calcification                                    LLE  mod infragenic art insuff w vessel calcification                                     Rt BELEM 1.13  lt BELEM 1.13

## 2024-01-16 NOTE — ASSESSMENT
[Arterial/Venous Disease] : arterial/venous disease [Medication Management] : medication management [Foot care/Footwear] : foot care/footwear [FreeTextEntry1] : Impression le art insuff clinically stable  and stable and stable carotid stenosis   Plan med conserv managemnt exercise program prn, protective measures continue pletal 50 bid ov w ben/pvr s/o art insuff 12 mo  jan 2025  ov w carotid duplex s/o stenosis 2 years march 2024

## 2024-01-19 ENCOUNTER — NON-APPOINTMENT (OUTPATIENT)
Age: 69
End: 2024-01-19

## 2024-02-09 ENCOUNTER — OUTPATIENT (OUTPATIENT)
Dept: OUTPATIENT SERVICES | Facility: HOSPITAL | Age: 69
LOS: 1 days | Discharge: HOME | End: 2024-02-09

## 2024-02-09 DIAGNOSIS — Z98.890 OTHER SPECIFIED POSTPROCEDURAL STATES: Chronic | ICD-10-CM

## 2024-02-09 DIAGNOSIS — C50.911 MALIGNANT NEOPLASM OF UNSPECIFIED SITE OF RIGHT FEMALE BREAST: Chronic | ICD-10-CM

## 2024-02-09 DIAGNOSIS — C43.72 MALIGNANT MELANOMA OF LEFT LOWER LIMB, INCLUDING HIP: Chronic | ICD-10-CM

## 2024-02-09 DIAGNOSIS — C07 MALIGNANT NEOPLASM OF PAROTID GLAND: Chronic | ICD-10-CM

## 2024-02-09 DIAGNOSIS — C50.919 MALIGNANT NEOPLASM OF UNSPECIFIED SITE OF UNSPECIFIED FEMALE BREAST: ICD-10-CM

## 2024-02-09 DIAGNOSIS — Z98.89 OTHER SPECIFIED POSTPROCEDURAL STATES: Chronic | ICD-10-CM

## 2024-02-09 DIAGNOSIS — H50.9 UNSPECIFIED STRABISMUS: Chronic | ICD-10-CM

## 2024-02-23 ENCOUNTER — APPOINTMENT (OUTPATIENT)
Dept: INFUSION THERAPY | Facility: HOSPITAL | Age: 69
End: 2024-02-23

## 2024-03-06 RX ORDER — ATORVASTATIN CALCIUM 10 MG/1
10 TABLET, FILM COATED ORAL
Qty: 90 | Refills: 3 | Status: ACTIVE | COMMUNITY
Start: 2021-06-17 | End: 1900-01-01

## 2024-04-04 ENCOUNTER — OUTPATIENT (OUTPATIENT)
Dept: OUTPATIENT SERVICES | Facility: HOSPITAL | Age: 69
LOS: 1 days | End: 2024-04-04
Payer: MEDICARE

## 2024-04-04 ENCOUNTER — APPOINTMENT (OUTPATIENT)
Dept: INTERNAL MEDICINE | Facility: CLINIC | Age: 69
End: 2024-04-04
Payer: MEDICARE

## 2024-04-04 VITALS
HEART RATE: 88 BPM | DIASTOLIC BLOOD PRESSURE: 70 MMHG | BODY MASS INDEX: 29.99 KG/M2 | WEIGHT: 180 LBS | HEIGHT: 65 IN | OXYGEN SATURATION: 97 % | SYSTOLIC BLOOD PRESSURE: 114 MMHG

## 2024-04-04 VITALS — SYSTOLIC BLOOD PRESSURE: 110 MMHG | DIASTOLIC BLOOD PRESSURE: 80 MMHG

## 2024-04-04 DIAGNOSIS — I10 ESSENTIAL (PRIMARY) HYPERTENSION: ICD-10-CM

## 2024-04-04 DIAGNOSIS — Z98.890 OTHER SPECIFIED POSTPROCEDURAL STATES: Chronic | ICD-10-CM

## 2024-04-04 DIAGNOSIS — C43.72 MALIGNANT MELANOMA OF LEFT LOWER LIMB, INCLUDING HIP: Chronic | ICD-10-CM

## 2024-04-04 DIAGNOSIS — H50.9 UNSPECIFIED STRABISMUS: Chronic | ICD-10-CM

## 2024-04-04 DIAGNOSIS — C07 MALIGNANT NEOPLASM OF PAROTID GLAND: Chronic | ICD-10-CM

## 2024-04-04 DIAGNOSIS — Z98.89 OTHER SPECIFIED POSTPROCEDURAL STATES: Chronic | ICD-10-CM

## 2024-04-04 DIAGNOSIS — K21.9 GASTRO-ESOPHAGEAL REFLUX DISEASE W/OUT ESOPHAGITIS: ICD-10-CM

## 2024-04-04 DIAGNOSIS — C50.911 MALIGNANT NEOPLASM OF UNSPECIFIED SITE OF RIGHT FEMALE BREAST: Chronic | ICD-10-CM

## 2024-04-04 PROCEDURE — 99213 OFFICE O/P EST LOW 20 MIN: CPT

## 2024-04-04 PROCEDURE — G0463: CPT

## 2024-04-04 RX ORDER — CILOSTAZOL 50 MG/1
50 TABLET ORAL
Qty: 180 | Refills: 3 | Status: DISCONTINUED | COMMUNITY
Start: 2021-01-27 | End: 2024-04-04

## 2024-04-04 NOTE — ASSESSMENT
[FreeTextEntry1] :  69 y/o F here for f/u. H/o breast CA, melanoma, HTN, HLD, spinal stenosis and neuropathy HTN: Controlled, c/w amlodipine HLD: on statin, c/w atorvastatin. LFT"s wnl GERD: stable on omeprazole h/o breast ca: follows with oncology H/o melananoma: follows with dermatology Neuropathy: stable on gabapetin HCM utd Continue with current regimen rto for AWV

## 2024-04-04 NOTE — HISTORY OF PRESENT ILLNESS
[de-identified] : 67 y/o F here for f/u. H/o breast CA, melanoma, HTN, HLD, spinal stenosis and neuropathy Saw Dr. Brown for dermatology in March Follows with Dr. Snowden for PAD Sees ENT Follows with hematology/onc- Dr. Norton GERD stable on omeprazole Neuropathy controlled with Gabapentin Needs one more Shingrix vaccine DEXA due 2025 Monitoring diet and exercising to manage prediabetes On statin- tolerating Walks regularly

## 2024-04-04 NOTE — PHYSICAL EXAM
[No Respiratory Distress] : no respiratory distress  [Normal Rate] : normal rate  [No Edema] : there was no peripheral edema [Soft] : abdomen soft [Non Tender] : non-tender [No HSM] : no HSM

## 2024-04-08 DIAGNOSIS — E78.5 HYPERLIPIDEMIA, UNSPECIFIED: ICD-10-CM

## 2024-04-08 DIAGNOSIS — K21.9 GASTRO-ESOPHAGEAL REFLUX DISEASE WITHOUT ESOPHAGITIS: ICD-10-CM

## 2024-04-08 DIAGNOSIS — R73.09 OTHER ABNORMAL GLUCOSE: ICD-10-CM

## 2024-04-08 DIAGNOSIS — I77.1 STRICTURE OF ARTERY: ICD-10-CM

## 2024-04-10 ENCOUNTER — OUTPATIENT (OUTPATIENT)
Dept: OUTPATIENT SERVICES | Facility: HOSPITAL | Age: 69
LOS: 1 days | Discharge: HOME | End: 2024-04-10

## 2024-04-10 DIAGNOSIS — Z98.890 OTHER SPECIFIED POSTPROCEDURAL STATES: Chronic | ICD-10-CM

## 2024-04-10 DIAGNOSIS — C50.919 MALIGNANT NEOPLASM OF UNSPECIFIED SITE OF UNSPECIFIED FEMALE BREAST: ICD-10-CM

## 2024-04-10 DIAGNOSIS — C07 MALIGNANT NEOPLASM OF PAROTID GLAND: Chronic | ICD-10-CM

## 2024-04-10 DIAGNOSIS — C43.72 MALIGNANT MELANOMA OF LEFT LOWER LIMB, INCLUDING HIP: Chronic | ICD-10-CM

## 2024-04-10 DIAGNOSIS — C50.911 MALIGNANT NEOPLASM OF UNSPECIFIED SITE OF RIGHT FEMALE BREAST: Chronic | ICD-10-CM

## 2024-04-10 DIAGNOSIS — H50.9 UNSPECIFIED STRABISMUS: Chronic | ICD-10-CM

## 2024-04-19 ENCOUNTER — APPOINTMENT (OUTPATIENT)
Dept: INFUSION THERAPY | Facility: HOSPITAL | Age: 69
End: 2024-04-19

## 2024-04-23 ENCOUNTER — APPOINTMENT (OUTPATIENT)
Dept: VASCULAR SURGERY | Facility: CLINIC | Age: 69
End: 2024-04-23
Payer: MEDICARE

## 2024-04-23 VITALS
TEMPERATURE: 97.7 F | BODY MASS INDEX: 29.99 KG/M2 | WEIGHT: 180 LBS | HEART RATE: 73 BPM | SYSTOLIC BLOOD PRESSURE: 115 MMHG | HEIGHT: 65 IN | DIASTOLIC BLOOD PRESSURE: 72 MMHG

## 2024-04-23 DIAGNOSIS — I65.23 OCCLUSION AND STENOSIS OF BILATERAL CAROTID ARTERIES: ICD-10-CM

## 2024-04-23 PROCEDURE — 99214 OFFICE O/P EST MOD 30 MIN: CPT

## 2024-04-23 PROCEDURE — 93880 EXTRACRANIAL BILAT STUDY: CPT

## 2024-04-23 NOTE — DATA REVIEWED
[FreeTextEntry1] : 6/20/2018 AID patent AA and damien iliac arterial systems  no sig stenosis  6/20/2018 BELEM/PVR mild to mod infragenic art insuff w vessel calcification  Rt BELEM 1.21 lt BELEM 1.17  7/2/2019 BELEM/PVR mild to mod infragenic art insuff w vessel calcification  Rt BELEM 1.19 lt BELEM 1.19  12/22/2020 BELEM/PVR mild to mod infragenic art insuff w vessel calcification  Rt BELEM 1.28 lt BELEM 1.23  1/21/2021 Carotid Duplex  Rt ICA  less 50% stenosis (179/66) by velocity criteria                          Lt  ICA  less 50% stenosis (109/54) by velocity criteria                          Damien Ant Vertebral Arterial Flow   12/21/2021  BELEM/PVR mild to mod infragenic art insuff w vessel calcification  Rt BELEM 1.27 lt BELEM 1.24  1/18/2022 Carotid Duplex  Rt ICA  less 50% stenosis                           Lt  ICA  less 50% stenosis                           Damien Ant Vertebral Arterial Flow   12/27/2022   BELEM/PVR mild to mod infragenic art insuff w vessel calcification                             Rt BELEM 1.10 lt BELEM 1.19  3/14/2023 Carotid Duplex  Rt ICA  less 50% stenosis                           Lt  ICA  less 50% stenosis                           Damien Ant Vertebral Arterial Flow   1/16/2024  BELEM/PVR RLE mild  infragenic art insuff w vessel calcification                                    LLE  mod infragenic art insuff w vessel calcification                                     Rt BELEM 1.13  lt BELEM 1.13  4/23/2024   Carotid Duplex  Rt ICA  less 50% stenosis  (135/58)                          Lt  ICA  less 50% stenosis (109/42)                          Damien Ant Vertebral Arterial Flow

## 2024-04-23 NOTE — PHYSICAL EXAM
[2+] : left 2+ [Right Carotid Bruit] : right carotid bruit heard [Left Carotid Bruit] : left carotid bruit heard [1+] : left 1+ [Ankle Swelling (On Exam)] : present [Ankle Swelling Bilaterally] : bilaterally  [Varicose Veins Of Lower Extremities] : bilaterally [] : bilaterally [Ankle Swelling On The Right] : mild [No Rash or Lesion] : No rash or lesion [Alert] : alert [Oriented to Person] : oriented to person [Oriented to Place] : oriented to place [Oriented to Time] : oriented to time [Calm] : calm [JVD] : no jugular venous distention  [de-identified] : nad [de-identified] : wnl [de-identified] : no resp distress [FreeTextEntry1] : mild to mod art insuff w mod trophic skin changes  mild venous insufff w mild  thi le edema and mild st dermatitis from knee distally  multiple small v veins and spider v thi shins and ankles 1-2 mm  [de-identified] : wml [de-identified] : cn 2-12 thi grossly intact  [de-identified] : cooperative

## 2024-04-23 NOTE — ASSESSMENT
[Arterial/Venous Disease] : arterial/venous disease [Medication Management] : medication management [Foot care/Footwear] : foot care/footwear [FreeTextEntry1] : Impression le art insuff clinically stable  and stable and stable carotid stenosis   Plan med conserv managemnt exercise program prn, protective measures continue pletal 50 bid ov w ben/pvr s/o art insuff 12 mo  jan 2025  ov w carotid duplex s/o stenosis  12 mo  may 2025

## 2024-04-23 NOTE — HISTORY OF PRESENT ILLNESS
[FreeTextEntry1] : pt is s/p spinal stenosis surgery pt is receiving rehab\par  since this surgery pt c/o thi foot numbness \par  pt c/o nocturnal leg and foot cramps 1-2x/week\par  intensity  mod to severe and currently worsening \par  worse w lying in bed \par  onset sev years ago w recent worsening \par  pt states that she is in wheelchair and transfers  and can only ambulate w walker w difficulty  [de-identified] : Pt states less thi  leg and foot nocturnal cramps from previous visit \par  pt is currently on pletal 50 bid \par  pt states no cerebrovasc c/o \par  \par

## 2024-04-30 ENCOUNTER — APPOINTMENT (OUTPATIENT)
Dept: OTOLARYNGOLOGY | Facility: CLINIC | Age: 69
End: 2024-04-30
Payer: MEDICARE

## 2024-04-30 VITALS
BODY MASS INDEX: 29.99 KG/M2 | HEIGHT: 65 IN | SYSTOLIC BLOOD PRESSURE: 125 MMHG | WEIGHT: 180 LBS | DIASTOLIC BLOOD PRESSURE: 76 MMHG | HEART RATE: 81 BPM

## 2024-04-30 DIAGNOSIS — H61.23 IMPACTED CERUMEN, BILATERAL: ICD-10-CM

## 2024-04-30 PROCEDURE — 69210 REMOVE IMPACTED EAR WAX UNI: CPT

## 2024-04-30 PROCEDURE — 99213 OFFICE O/P EST LOW 20 MIN: CPT | Mod: 25

## 2024-04-30 NOTE — CONSULT LETTER
[Dear  ___] : Dear  [unfilled], [Courtesy Letter:] : I had the pleasure of seeing your patient, [unfilled], in my office today. [Please see my note below.] : Please see my note below. [Sincerely,] : Sincerely, [FreeTextEntry2] : Terrie Ortiz MD (Jeremy CHATMAN) [FreeTextEntry3] : Edilma Chang PA-C  Department of Otolaryngology [facility] Otol[facility]ology at [address], [place] [address]   James Tompkins MD, FACS  Chief of Otolaryngology at [facility]    Dept. of Otolaryngology  [facility]

## 2024-04-30 NOTE — ASSESSMENT
[FreeTextEntry1] : Pt's L parapharyngeal mass remains stable.  Will continue to observe.  pt to f/u in 6 months to recheck her ears.

## 2024-04-30 NOTE — PHYSICAL EXAM
[Midline] : trachea located in midline position [Normal] : no rashes [de-identified] : No mass palpable.  Right parotid scar present [de-identified] : CI bilaterally.  Removed.   [de-identified] : IDL: OP, HP, and larynx WNL with normal VC mobility.  No masses. [FreeTextEntry2] : Right facial synkinesis [de-identified] : Pt wheelchair bound but can stand with support [de-identified] : Facial synkinesis

## 2024-04-30 NOTE — HISTORY OF PRESENT ILLNESS
[de-identified] : 67F with PMHx parotid cancer (2005) and left parapharyngeal space mass (since 2005) presents for follow up. Patient reporting frequent throat clearing, intermittent dysphagia, and heartburn. Pt reports Left neck mass stable no changes in size, denies changes in size of the mass, neck pain, fever, chills, malaise, unintentional weight loss, night sweats, odynophagia, dyspnea, dysphonia, cough, globus, or otalgia.

## 2024-04-30 NOTE — REASON FOR VISIT
[Subsequent Evaluation] : a subsequent evaluation for [FreeTextEntry2] : parapharyngeal mass and R cerumen impaction.

## 2024-05-06 ENCOUNTER — RX RENEWAL (OUTPATIENT)
Age: 69
End: 2024-05-06

## 2024-05-06 RX ORDER — OMEPRAZOLE 20 MG/1
20 CAPSULE, DELAYED RELEASE ORAL
Qty: 90 | Refills: 3 | Status: ACTIVE | COMMUNITY
Start: 2021-09-22 | End: 1900-01-01

## 2024-05-29 ENCOUNTER — RX RENEWAL (OUTPATIENT)
Age: 69
End: 2024-05-29

## 2024-05-31 ENCOUNTER — APPOINTMENT (OUTPATIENT)
Dept: PLASTIC SURGERY | Facility: CLINIC | Age: 69
End: 2024-05-31

## 2024-06-11 ENCOUNTER — OUTPATIENT (OUTPATIENT)
Dept: OUTPATIENT SERVICES | Facility: HOSPITAL | Age: 69
LOS: 1 days | Discharge: HOME | End: 2024-06-11

## 2024-06-11 DIAGNOSIS — Z98.890 OTHER SPECIFIED POSTPROCEDURAL STATES: Chronic | ICD-10-CM

## 2024-06-11 DIAGNOSIS — Z98.89 OTHER SPECIFIED POSTPROCEDURAL STATES: Chronic | ICD-10-CM

## 2024-06-11 DIAGNOSIS — H50.9 UNSPECIFIED STRABISMUS: Chronic | ICD-10-CM

## 2024-06-11 DIAGNOSIS — C50.919 MALIGNANT NEOPLASM OF UNSPECIFIED SITE OF UNSPECIFIED FEMALE BREAST: ICD-10-CM

## 2024-06-11 DIAGNOSIS — C07 MALIGNANT NEOPLASM OF PAROTID GLAND: Chronic | ICD-10-CM

## 2024-06-11 DIAGNOSIS — C50.911 MALIGNANT NEOPLASM OF UNSPECIFIED SITE OF RIGHT FEMALE BREAST: Chronic | ICD-10-CM

## 2024-06-11 DIAGNOSIS — C43.72 MALIGNANT MELANOMA OF LEFT LOWER LIMB, INCLUDING HIP: Chronic | ICD-10-CM

## 2024-06-14 ENCOUNTER — APPOINTMENT (OUTPATIENT)
Dept: INFUSION THERAPY | Facility: HOSPITAL | Age: 69
End: 2024-06-14

## 2024-06-18 ENCOUNTER — APPOINTMENT (OUTPATIENT)
Dept: INTERNAL MEDICINE | Facility: CLINIC | Age: 69
End: 2024-06-18
Payer: MEDICARE

## 2024-06-18 ENCOUNTER — OUTPATIENT (OUTPATIENT)
Dept: OUTPATIENT SERVICES | Facility: HOSPITAL | Age: 69
LOS: 1 days | End: 2024-06-18
Payer: MEDICARE

## 2024-06-18 VITALS
BODY MASS INDEX: 29.49 KG/M2 | SYSTOLIC BLOOD PRESSURE: 120 MMHG | WEIGHT: 177 LBS | HEART RATE: 82 BPM | DIASTOLIC BLOOD PRESSURE: 60 MMHG | OXYGEN SATURATION: 97 % | HEIGHT: 65 IN

## 2024-06-18 VITALS — DIASTOLIC BLOOD PRESSURE: 60 MMHG | SYSTOLIC BLOOD PRESSURE: 120 MMHG

## 2024-06-18 DIAGNOSIS — I10 ESSENTIAL (PRIMARY) HYPERTENSION: ICD-10-CM

## 2024-06-18 DIAGNOSIS — C50.911 MALIGNANT NEOPLASM OF UNSPECIFIED SITE OF RIGHT FEMALE BREAST: Chronic | ICD-10-CM

## 2024-06-18 DIAGNOSIS — I77.1 STRICTURE OF ARTERY: ICD-10-CM

## 2024-06-18 DIAGNOSIS — C43.72 MALIGNANT MELANOMA OF LEFT LOWER LIMB, INCLUDING HIP: Chronic | ICD-10-CM

## 2024-06-18 DIAGNOSIS — H50.9 UNSPECIFIED STRABISMUS: Chronic | ICD-10-CM

## 2024-06-18 DIAGNOSIS — Z98.890 OTHER SPECIFIED POSTPROCEDURAL STATES: Chronic | ICD-10-CM

## 2024-06-18 DIAGNOSIS — Z00.00 ENCOUNTER FOR GENERAL ADULT MEDICAL EXAMINATION W/OUT ABNORMAL FINDINGS: ICD-10-CM

## 2024-06-18 DIAGNOSIS — E78.5 HYPERLIPIDEMIA, UNSPECIFIED: ICD-10-CM

## 2024-06-18 DIAGNOSIS — C07 MALIGNANT NEOPLASM OF PAROTID GLAND: Chronic | ICD-10-CM

## 2024-06-18 DIAGNOSIS — R73.09 OTHER ABNORMAL GLUCOSE: ICD-10-CM

## 2024-06-18 DIAGNOSIS — Z98.89 OTHER SPECIFIED POSTPROCEDURAL STATES: Chronic | ICD-10-CM

## 2024-06-18 PROCEDURE — G2211 COMPLEX E/M VISIT ADD ON: CPT

## 2024-06-18 PROCEDURE — 99214 OFFICE O/P EST MOD 30 MIN: CPT

## 2024-06-18 PROCEDURE — G0463: CPT

## 2024-06-18 RX ORDER — AMLODIPINE BESYLATE 5 MG/1
5 TABLET ORAL
Qty: 1 | Refills: 3 | Status: ACTIVE | COMMUNITY
Start: 2023-09-28 | End: 1900-01-01

## 2024-06-18 RX ORDER — GABAPENTIN 300 MG/1
300 CAPSULE ORAL EVERY 8 HOURS
Qty: 270 | Refills: 3 | Status: ACTIVE | COMMUNITY
Start: 2021-06-17 | End: 1900-01-01

## 2024-06-19 LAB
ALBUMIN SERPL ELPH-MCNC: 4.4 G/DL
ALP BLD-CCNC: 113 U/L
ALT SERPL-CCNC: 10 U/L
ANION GAP SERPL CALC-SCNC: 17 MMOL/L
AST SERPL-CCNC: 15 U/L
BILIRUB SERPL-MCNC: 0.2 MG/DL
BUN SERPL-MCNC: 16 MG/DL
CALCIUM SERPL-MCNC: 10 MG/DL
CHLORIDE SERPL-SCNC: 105 MMOL/L
CHOLEST SERPL-MCNC: 138 MG/DL
CO2 SERPL-SCNC: 22 MMOL/L
CREAT SERPL-MCNC: 0.86 MG/DL
EGFR: 72 ML/MIN/1.73M2
ESTIMATED AVERAGE GLUCOSE: 120 MG/DL
GLUCOSE SERPL-MCNC: 87 MG/DL
HBA1C MFR BLD HPLC: 5.8 %
HCT VFR BLD CALC: 37 %
HDLC SERPL-MCNC: 65 MG/DL
HGB BLD-MCNC: 12 G/DL
LDLC SERPL CALC-MCNC: 56 MG/DL
MCHC RBC-ENTMCNC: 31.6 PG
MCHC RBC-ENTMCNC: 32.4 GM/DL
MCV RBC AUTO: 97.4 FL
NONHDLC SERPL-MCNC: 73 MG/DL
PLATELET # BLD AUTO: 253 K/UL
POTASSIUM SERPL-SCNC: 4.8 MMOL/L
PROT SERPL-MCNC: 7.7 G/DL
RBC # BLD: 3.8 M/UL
RBC # FLD: 12.4 %
SODIUM SERPL-SCNC: 144 MMOL/L
TRIGL SERPL-MCNC: 91 MG/DL
TSH SERPL-ACNC: 2.94 UIU/ML
WBC # FLD AUTO: 5.81 K/UL

## 2024-06-19 NOTE — ASSESSMENT
[FreeTextEntry1] : 69 y/o F here for f/u. H/o breast CA, melanoma, HTN, HLD, spinal stenosis and neuropathy here for f/u HTN: Controlled, c/w amlodipine, renewed HLD: c/w atorvastatin, check cmp, flp Onc: Follows with oncology Neuropathy: Stable, renewed gabapentin HCM: Reviewed HCM: measures with patient- she needs PAP Flu shot this Fall rto for AWV

## 2024-06-19 NOTE — HISTORY OF PRESENT ILLNESS
[de-identified] : 70 y/o F here for f/u. Has a "lump" or fatty tumor in her Left upper back. Had u/s done in past, lipoma. Comes and goes. Onc:  will be seeing Dr. Norton Needs refills on gabapentin and amlodipine. Did see [facility]- had cerumen impaction Walks frequently. Planning to go to a one day cruise in [place]. Otherwise stable. She has been on Letrozole for close to 10 years. She will be discussing duration of therapy with oncology

## 2024-06-24 DIAGNOSIS — I77.1 STRICTURE OF ARTERY: ICD-10-CM

## 2024-06-24 DIAGNOSIS — E78.5 HYPERLIPIDEMIA, UNSPECIFIED: ICD-10-CM

## 2024-06-24 DIAGNOSIS — R73.09 OTHER ABNORMAL GLUCOSE: ICD-10-CM

## 2024-06-25 ENCOUNTER — APPOINTMENT (OUTPATIENT)
Dept: OTOLARYNGOLOGY | Facility: CLINIC | Age: 69
End: 2024-06-25
Payer: MEDICARE

## 2024-06-25 VITALS
HEART RATE: 84 BPM | OXYGEN SATURATION: 97 % | BODY MASS INDEX: 28.99 KG/M2 | WEIGHT: 174 LBS | DIASTOLIC BLOOD PRESSURE: 76 MMHG | SYSTOLIC BLOOD PRESSURE: 120 MMHG | HEIGHT: 65 IN

## 2024-06-25 DIAGNOSIS — R22.1 LOCALIZED SWELLING, MASS AND LUMP, NECK: ICD-10-CM

## 2024-06-25 DIAGNOSIS — H61.21 IMPACTED CERUMEN, RIGHT EAR: ICD-10-CM

## 2024-06-25 DIAGNOSIS — H91.8X3 OTHER SPECIFIED HEARING LOSS, BILATERAL: ICD-10-CM

## 2024-06-25 PROCEDURE — 99213 OFFICE O/P EST LOW 20 MIN: CPT

## 2024-06-25 PROCEDURE — 92567 TYMPANOMETRY: CPT

## 2024-06-25 PROCEDURE — 92557 COMPREHENSIVE HEARING TEST: CPT

## 2024-06-27 ENCOUNTER — NON-APPOINTMENT (OUTPATIENT)
Age: 69
End: 2024-06-27

## 2024-06-28 ENCOUNTER — APPOINTMENT (OUTPATIENT)
Dept: HEMATOLOGY ONCOLOGY | Facility: CLINIC | Age: 69
End: 2024-06-28
Payer: MEDICARE

## 2024-06-28 VITALS
WEIGHT: 174.14 LBS | SYSTOLIC BLOOD PRESSURE: 118 MMHG | RESPIRATION RATE: 16 BRPM | OXYGEN SATURATION: 95 % | DIASTOLIC BLOOD PRESSURE: 80 MMHG | HEART RATE: 73 BPM | BODY MASS INDEX: 28.98 KG/M2 | TEMPERATURE: 97.2 F

## 2024-06-28 DIAGNOSIS — Z79.811 LONG TERM (CURRENT) USE OF AROMATASE INHIBITORS: ICD-10-CM

## 2024-06-28 DIAGNOSIS — C50.919 MALIGNANT NEOPLASM OF UNSPECIFIED SITE OF UNSPECIFIED FEMALE BREAST: ICD-10-CM

## 2024-06-28 PROCEDURE — 99214 OFFICE O/P EST MOD 30 MIN: CPT

## 2024-06-28 PROCEDURE — G2211 COMPLEX E/M VISIT ADD ON: CPT

## 2024-07-16 ENCOUNTER — APPOINTMENT (OUTPATIENT)
Dept: OBGYN | Facility: CLINIC | Age: 69
End: 2024-07-16

## 2024-07-16 VITALS
DIASTOLIC BLOOD PRESSURE: 82 MMHG | HEIGHT: 65 IN | BODY MASS INDEX: 28.7 KG/M2 | WEIGHT: 172.25 LBS | SYSTOLIC BLOOD PRESSURE: 130 MMHG

## 2024-07-16 DIAGNOSIS — Z01.419 ENCOUNTER FOR GYNECOLOGICAL EXAMINATION (GENERAL) (ROUTINE) W/OUT ABNORMAL FINDINGS: ICD-10-CM

## 2024-07-16 PROCEDURE — 99397 PER PM REEVAL EST PAT 65+ YR: CPT

## 2024-07-23 LAB — CYTOLOGY CVX/VAG DOC THIN PREP: NORMAL

## 2024-07-30 ENCOUNTER — APPOINTMENT (OUTPATIENT)
Dept: PLASTIC SURGERY | Facility: CLINIC | Age: 69
End: 2024-07-30
Payer: MEDICARE

## 2024-07-30 VITALS
SYSTOLIC BLOOD PRESSURE: 133 MMHG | HEART RATE: 69 BPM | OXYGEN SATURATION: 96 % | HEIGHT: 65 IN | TEMPERATURE: 97.8 F | WEIGHT: 172 LBS | DIASTOLIC BLOOD PRESSURE: 79 MMHG | BODY MASS INDEX: 28.66 KG/M2

## 2024-07-30 DIAGNOSIS — C50.919 MALIGNANT NEOPLASM OF UNSPECIFIED SITE OF UNSPECIFIED FEMALE BREAST: ICD-10-CM

## 2024-07-30 PROCEDURE — 99213 OFFICE O/P EST LOW 20 MIN: CPT

## 2024-07-30 NOTE — PHYSICAL EXAM
[Normocephalic] : normocephalic [Atraumatic] : atraumatic [EOMI] : extra ocular movement intact [Sclera nonicteric] : sclera nonicteric [Supple] : supple [No Supraclavicular Adenopathy] : no supraclavicular adenopathy [Examined in the supine and seated position] : examined in the supine and seated position [No dominant masses] : no dominant masses in right breast  [No dominant masses] : no dominant masses left breast [No Axillary Lymphadenopathy] : no left axillary lymphadenopathy [No Edema] : no edema [No Rashes] : no rashes [No Ulceration] : no ulceration [de-identified] : No palpable axillary lipoma on today's exam. [de-identified] : S/P bilateral mastectomies with implants.

## 2024-07-30 NOTE — PHYSICAL EXAM
[Normocephalic] : normocephalic [Atraumatic] : atraumatic [EOMI] : extra ocular movement intact [Sclera nonicteric] : sclera nonicteric [Supple] : supple [No Supraclavicular Adenopathy] : no supraclavicular adenopathy [Examined in the supine and seated position] : examined in the supine and seated position [No dominant masses] : no dominant masses in right breast  [No dominant masses] : no dominant masses left breast [No Axillary Lymphadenopathy] : no left axillary lymphadenopathy [No Edema] : no edema [No Rashes] : no rashes [No Ulceration] : no ulceration [de-identified] : No palpable axillary lipoma on today's exam. [de-identified] : S/P bilateral mastectomies with implants.

## 2024-07-30 NOTE — HISTORY OF PRESENT ILLNESS
[FreeTextEntry1] : Patient is a 69 year old female here today for a follow up visit.  She has a history of right breast cancer in 2007 (Stage I) s/p bilateral mastectomies, implants, chemotherapy 12/2014 She had a right breast cancer recurrence: s/p right wide excision, right axillary LN dissection. Pathology revealed invasive lobular carcinoma with 1 of 15 positive lymph nodes. ER+/[place]+/HER2 Faith- She underwent post op XRT; no chemo  9/09/2022 Bilateral MRI: Right: Susceptibility artifact from a port is noted in the right superior chest. There is no suspicious enhancement in the right breast/chest wall. The implant is intact. No periimplant fluid collection. Left: Susceptibility artifact in the left chest wall is noted, with reported history of loop recorder.  There is no suspicious enhancement in the left breast/chest wall. The implant is intact. No periimplant fluid collection.  Axilla/other: There is no significant axillary or internal mammary lymphadenopathy. Clinical follow up recommended. BI-RADS 1 3/23/2023 Left ultrasound: No suspicious solid mass. Well-circumscribed echogenic mass is visualized corresponding to the site of clinical concern measuring 3.8 x 2 x 5.2 cm. The appearance is compatible with lipoma. Clinical follow up recommended. BI-RADS 2 Plastic Surgeon: Dr. Amos, saw him 2 years ago ONC: Dr. Tito Norton, on Letrozole, saw her 6/28/2024 She denies any current breast concerns. She can feel the lipoma of her left lower shoulder.

## 2024-07-30 NOTE — CONSULT LETTER
[Dear  ___] : Dear  [unfilled], [Courtesy Letter:] : I had the pleasure of seeing your patient, [unfilled], in my office today. [Please see my note below.] : Please see my note below. [Sincerely,] : Sincerely, [DrJimi  ___] : Dr. LAWSON [FreeTextEntry3] : Tiffany Bustos, RPA-C\par  Breast Surgery\par  [address]\par  [address]\[place] [address]\par  (Phone) [phone]\par  (Fax) [phone]  [DrJimi ___] : Dr. LAWSON

## 2024-07-30 NOTE — CONSULT LETTER
[Dear  ___] : Dear  [unfilled], [Courtesy Letter:] : I had the pleasure of seeing your patient, [unfilled], in my office today. [Please see my note below.] : Please see my note below. [Sincerely,] : Sincerely, [DrJimi  ___] : Dr. LAWSON [FreeTextEntry3] : Tiffany Bustos, RPA-C\par  Breast Surgery\par  [address]\par  [address]\[place] [address]\par  (Phone) [phone]\par  (Fax) [phone]  [DrJimi ___] : Dr. LAWSON

## 2024-07-30 NOTE — ASSESSMENT
[FreeTextEntry1] : H/O right breast cancer in 2007 (Stage I) s/p bilateral mastectomies, then recurrence 12/2014 s/p lumpectomy, axillary lymph node dissection and XRT No evidence of disease recurrence on CBE   1. Bilateral breast MRI with IV contrast (implant evaluation) due 3/2025 2. Follow up office visit due in 1 year 3. Advised monthly self breast examinations and advised her to contact me if she has any concerns.

## 2024-07-30 NOTE — HISTORY OF PRESENT ILLNESS
[FreeTextEntry1] : Patient is a 69 year old female here today for a follow up visit.  She has a history of right breast cancer in 2007 (Stage I) s/p bilateral mastectomies, implants, chemotherapy 12/2014 She had a right breast cancer recurrence: s/p right wide excision, right axillary LN dissection. Pathology revealed invasive lobular carcinoma with 1 of 15 positive lymph nodes. ER+/[place]+/HER2 Faith- She underwent post op XRT; no chemo  9/09/2022 Bilateral MRI: Right: Susceptibility artifact from a port is noted in the right superior chest. There is no suspicious enhancement in the right breast/chest wall. The implant is intact. No periimplant fluid collection. Left: Susceptibility artifact in the left chest wall is noted, with reported history of loop recorder.  There is no suspicious enhancement in the left breast/chest wall. The implant is intact. No periimplant fluid collection.  Axilla/other: There is no significant axillary or internal mammary lymphadenopathy. Clinical follow up recommended. BI-RADS 1 3/23/2023 Left ultrasound: No suspicious solid mass. Well-circumscribed echogenic mass is visualized corresponding to the site of clinical concern measuring 3.8 x 2 x 5.2 cm. The appearance is compatible with lipoma. Clinical follow up recommended. BI-RADS 2 Plastic Surgeon: Dr. Amos, saw him 2 years ago ONC: Dr. Tito Norton, on Letrozole, saw her 6/28/2024 She denies any current breast concerns. She can feel the lipoma of her left lower shoulder.

## 2024-08-09 ENCOUNTER — APPOINTMENT (OUTPATIENT)
Dept: INFUSION THERAPY | Facility: HOSPITAL | Age: 69
End: 2024-08-09

## 2024-08-17 NOTE — ASU PREOP CHECKLIST - ISOLATION PRECAUTIONS
Problem: Pain Acute  Goal: Acceptable Pain Control and Functional Ability  Outcome: Ongoing, Progressing  Intervention: Prevent or Manage Pain  Recent Flowsheet Documentation  Taken 8/17/2024 0810 by Debbie Becerra, RN  Sensory Stimulation Regulation: lighting decreased  Bowel Elimination Promotion: adequate fluid intake promoted  Sleep/Rest Enhancement:   awakenings minimized   family presence promoted   room darkened  Medication Review/Management: medications reviewed  Intervention: Optimize Psychosocial Wellbeing  Recent Flowsheet Documentation  Taken 8/17/2024 0810 by Debbie Becerra, RN  Supportive Measures: active listening utilized  Diversional Activities: television   Goal Outcome Evaluation:   Aox4, 4L NC, Tele monitored- Sinus tach w/ permanent pacer. Consults to Pulm and ID. IV mag replaced and IV abx. Incentive spirometer. Patient now DNR/DNI and palliative care consulted.                                             none

## 2024-09-24 ENCOUNTER — OUTPATIENT (OUTPATIENT)
Dept: OUTPATIENT SERVICES | Facility: HOSPITAL | Age: 69
LOS: 1 days | Discharge: HOME | End: 2024-09-24

## 2024-09-24 ENCOUNTER — APPOINTMENT (OUTPATIENT)
Dept: OTOLARYNGOLOGY | Facility: CLINIC | Age: 69
End: 2024-09-24

## 2024-09-24 DIAGNOSIS — C07 MALIGNANT NEOPLASM OF PAROTID GLAND: Chronic | ICD-10-CM

## 2024-09-24 DIAGNOSIS — Z98.89 OTHER SPECIFIED POSTPROCEDURAL STATES: Chronic | ICD-10-CM

## 2024-09-24 DIAGNOSIS — H50.9 UNSPECIFIED STRABISMUS: Chronic | ICD-10-CM

## 2024-09-24 DIAGNOSIS — Z98.890 OTHER SPECIFIED POSTPROCEDURAL STATES: Chronic | ICD-10-CM

## 2024-09-24 DIAGNOSIS — C50.911 MALIGNANT NEOPLASM OF UNSPECIFIED SITE OF RIGHT FEMALE BREAST: Chronic | ICD-10-CM

## 2024-09-24 DIAGNOSIS — C50.919 MALIGNANT NEOPLASM OF UNSPECIFIED SITE OF UNSPECIFIED FEMALE BREAST: ICD-10-CM

## 2024-09-24 DIAGNOSIS — C43.72 MALIGNANT MELANOMA OF LEFT LOWER LIMB, INCLUDING HIP: Chronic | ICD-10-CM

## 2024-10-04 ENCOUNTER — NON-APPOINTMENT (OUTPATIENT)
Age: 69
End: 2024-10-04

## 2024-10-04 ENCOUNTER — APPOINTMENT (OUTPATIENT)
Dept: INFUSION THERAPY | Facility: HOSPITAL | Age: 69
End: 2024-10-04

## 2024-10-15 NOTE — H&P PST ADULT - ACTIVITY
Patient was seen by general surgery for some episodes of nausea and emesis and some constipation. KUB was concerning for SBO vs. Ileus. Follow up CT AP did not show evidence of mechanical obstruction, was more indicative of Ileus. XR gastrogaffin challenge ordered by general surgery.     Plan:   - place NPO. Continue NGT to LIWS   - monitor electrolytes daily, correct as needed   - Eliquis switched to heparin as it might not have been fully absorbed considering Ileus   - IV magnesium one time for pain medication. Can try other IV pain medications if pain is not relieved by Iv mag.   - general surgery following, appreciate recs    walks w/ walker, house work, able to carry laundry  up stairs

## 2024-10-29 ENCOUNTER — APPOINTMENT (OUTPATIENT)
Dept: OTOLARYNGOLOGY | Facility: CLINIC | Age: 69
End: 2024-10-29
Payer: MEDICARE

## 2024-10-29 VITALS
OXYGEN SATURATION: 97 % | BODY MASS INDEX: 29.99 KG/M2 | HEART RATE: 77 BPM | WEIGHT: 180 LBS | SYSTOLIC BLOOD PRESSURE: 123 MMHG | HEIGHT: 65 IN | DIASTOLIC BLOOD PRESSURE: 81 MMHG

## 2024-10-29 DIAGNOSIS — R22.1 LOCALIZED SWELLING, MASS AND LUMP, NECK: ICD-10-CM

## 2024-10-29 DIAGNOSIS — Z85.818 PERSONAL HISTORY OF MALIGNANT NEOPLASM OF OTHER SITES OF LIP, ORAL CAVITY, AND PHARYNX: ICD-10-CM

## 2024-10-29 DIAGNOSIS — H61.21 IMPACTED CERUMEN, RIGHT EAR: ICD-10-CM

## 2024-10-29 PROCEDURE — 69210 REMOVE IMPACTED EAR WAX UNI: CPT | Mod: RT

## 2024-10-29 PROCEDURE — 99214 OFFICE O/P EST MOD 30 MIN: CPT | Mod: 25

## 2024-11-15 ENCOUNTER — RX RENEWAL (OUTPATIENT)
Age: 69
End: 2024-11-15

## 2024-12-12 ENCOUNTER — APPOINTMENT (OUTPATIENT)
Dept: INTERNAL MEDICINE | Facility: CLINIC | Age: 69
End: 2024-12-12
Payer: MEDICARE

## 2024-12-12 ENCOUNTER — OUTPATIENT (OUTPATIENT)
Dept: OUTPATIENT SERVICES | Facility: HOSPITAL | Age: 69
LOS: 1 days | End: 2024-12-12
Payer: COMMERCIAL

## 2024-12-12 VITALS
HEIGHT: 65 IN | BODY MASS INDEX: 29.66 KG/M2 | OXYGEN SATURATION: 97 % | DIASTOLIC BLOOD PRESSURE: 80 MMHG | HEART RATE: 82 BPM | SYSTOLIC BLOOD PRESSURE: 132 MMHG | WEIGHT: 178 LBS

## 2024-12-12 DIAGNOSIS — H50.9 UNSPECIFIED STRABISMUS: Chronic | ICD-10-CM

## 2024-12-12 DIAGNOSIS — K21.9 GASTRO-ESOPHAGEAL REFLUX DISEASE W/OUT ESOPHAGITIS: ICD-10-CM

## 2024-12-12 DIAGNOSIS — Z85.3 PERSONAL HISTORY OF MALIGNANT NEOPLASM OF BREAST: ICD-10-CM

## 2024-12-12 DIAGNOSIS — Z85.820 PERSONAL HISTORY OF MALIGNANT MELANOMA OF SKIN: ICD-10-CM

## 2024-12-12 DIAGNOSIS — C50.911 MALIGNANT NEOPLASM OF UNSPECIFIED SITE OF RIGHT FEMALE BREAST: Chronic | ICD-10-CM

## 2024-12-12 DIAGNOSIS — I77.1 STRICTURE OF ARTERY: ICD-10-CM

## 2024-12-12 DIAGNOSIS — Z98.890 OTHER SPECIFIED POSTPROCEDURAL STATES: Chronic | ICD-10-CM

## 2024-12-12 DIAGNOSIS — E78.5 HYPERLIPIDEMIA, UNSPECIFIED: ICD-10-CM

## 2024-12-12 DIAGNOSIS — I10 ESSENTIAL (PRIMARY) HYPERTENSION: ICD-10-CM

## 2024-12-12 DIAGNOSIS — C43.72 MALIGNANT MELANOMA OF LEFT LOWER LIMB, INCLUDING HIP: Chronic | ICD-10-CM

## 2024-12-12 DIAGNOSIS — R73.09 OTHER ABNORMAL GLUCOSE: ICD-10-CM

## 2024-12-12 DIAGNOSIS — C07 MALIGNANT NEOPLASM OF PAROTID GLAND: Chronic | ICD-10-CM

## 2024-12-12 PROCEDURE — G0463: CPT

## 2024-12-12 PROCEDURE — G2211 COMPLEX E/M VISIT ADD ON: CPT

## 2024-12-12 PROCEDURE — 99213 OFFICE O/P EST LOW 20 MIN: CPT

## 2024-12-15 PROBLEM — Z85.820: Status: RESOLVED | Noted: 2023-12-17 | Resolved: 2024-12-15

## 2024-12-23 ENCOUNTER — OUTPATIENT (OUTPATIENT)
Dept: OUTPATIENT SERVICES | Facility: HOSPITAL | Age: 69
LOS: 1 days | Discharge: HOME | End: 2024-12-23

## 2024-12-23 DIAGNOSIS — I77.1 STRICTURE OF ARTERY: ICD-10-CM

## 2024-12-23 DIAGNOSIS — C50.911 MALIGNANT NEOPLASM OF UNSPECIFIED SITE OF RIGHT FEMALE BREAST: Chronic | ICD-10-CM

## 2024-12-23 DIAGNOSIS — E78.5 HYPERLIPIDEMIA, UNSPECIFIED: ICD-10-CM

## 2024-12-23 DIAGNOSIS — Z98.890 OTHER SPECIFIED POSTPROCEDURAL STATES: Chronic | ICD-10-CM

## 2024-12-23 DIAGNOSIS — K21.9 GASTRO-ESOPHAGEAL REFLUX DISEASE WITHOUT ESOPHAGITIS: ICD-10-CM

## 2024-12-23 DIAGNOSIS — Z98.89 OTHER SPECIFIED POSTPROCEDURAL STATES: Chronic | ICD-10-CM

## 2024-12-23 DIAGNOSIS — R73.09 OTHER ABNORMAL GLUCOSE: ICD-10-CM

## 2024-12-23 DIAGNOSIS — H50.9 UNSPECIFIED STRABISMUS: Chronic | ICD-10-CM

## 2024-12-23 DIAGNOSIS — C43.72 MALIGNANT MELANOMA OF LEFT LOWER LIMB, INCLUDING HIP: Chronic | ICD-10-CM

## 2024-12-23 DIAGNOSIS — C07 MALIGNANT NEOPLASM OF PAROTID GLAND: Chronic | ICD-10-CM

## 2024-12-27 ENCOUNTER — APPOINTMENT (OUTPATIENT)
Dept: INFUSION THERAPY | Facility: HOSPITAL | Age: 69
End: 2024-12-27

## 2024-12-27 ENCOUNTER — RESULT REVIEW (OUTPATIENT)
Age: 69
End: 2024-12-27

## 2024-12-27 ENCOUNTER — APPOINTMENT (OUTPATIENT)
Dept: HEMATOLOGY ONCOLOGY | Facility: CLINIC | Age: 69
End: 2024-12-27
Payer: MEDICARE

## 2024-12-27 VITALS
WEIGHT: 178.88 LBS | HEART RATE: 68 BPM | BODY MASS INDEX: 29.77 KG/M2 | DIASTOLIC BLOOD PRESSURE: 78 MMHG | RESPIRATION RATE: 16 BRPM | TEMPERATURE: 97.2 F | OXYGEN SATURATION: 99 % | SYSTOLIC BLOOD PRESSURE: 127 MMHG

## 2024-12-27 DIAGNOSIS — Z85.3 PERSONAL HISTORY OF MALIGNANT NEOPLASM OF BREAST: ICD-10-CM

## 2024-12-27 LAB
ALBUMIN SERPL ELPH-MCNC: 4 G/DL — SIGNIFICANT CHANGE UP (ref 3.3–5)
ALP SERPL-CCNC: 103 U/L — SIGNIFICANT CHANGE UP (ref 40–120)
ALT FLD-CCNC: 8 U/L — LOW (ref 10–45)
ANION GAP SERPL CALC-SCNC: 13 MMOL/L — SIGNIFICANT CHANGE UP (ref 5–17)
AST SERPL-CCNC: 18 U/L — SIGNIFICANT CHANGE UP (ref 10–40)
BASOPHILS # BLD AUTO: 0.04 K/UL — SIGNIFICANT CHANGE UP (ref 0–0.2)
BASOPHILS NFR BLD AUTO: 0.7 % — SIGNIFICANT CHANGE UP (ref 0–2)
BILIRUB SERPL-MCNC: 0.2 MG/DL — SIGNIFICANT CHANGE UP (ref 0.2–1.2)
BUN SERPL-MCNC: 16 MG/DL — SIGNIFICANT CHANGE UP (ref 7–23)
CALCIUM SERPL-MCNC: 9.2 MG/DL — SIGNIFICANT CHANGE UP (ref 8.4–10.5)
CHLORIDE SERPL-SCNC: 105 MMOL/L — SIGNIFICANT CHANGE UP (ref 96–108)
CO2 SERPL-SCNC: 24 MMOL/L — SIGNIFICANT CHANGE UP (ref 22–31)
CREAT SERPL-MCNC: 0.77 MG/DL — SIGNIFICANT CHANGE UP (ref 0.5–1.3)
EGFR: 82 ML/MIN/1.73M2 — SIGNIFICANT CHANGE UP
EOSINOPHIL # BLD AUTO: 0.08 K/UL — SIGNIFICANT CHANGE UP (ref 0–0.5)
EOSINOPHIL NFR BLD AUTO: 1.4 % — SIGNIFICANT CHANGE UP (ref 0–6)
GLUCOSE SERPL-MCNC: 84 MG/DL — SIGNIFICANT CHANGE UP (ref 70–99)
HCT VFR BLD CALC: 35.1 % — SIGNIFICANT CHANGE UP (ref 34.5–45)
HGB BLD-MCNC: 11.5 G/DL — SIGNIFICANT CHANGE UP (ref 11.5–15.5)
IMM GRANULOCYTES NFR BLD AUTO: 0.5 % — SIGNIFICANT CHANGE UP (ref 0–0.9)
LYMPHOCYTES # BLD AUTO: 1.64 K/UL — SIGNIFICANT CHANGE UP (ref 1–3.3)
LYMPHOCYTES # BLD AUTO: 28.8 % — SIGNIFICANT CHANGE UP (ref 13–44)
MCHC RBC-ENTMCNC: 31.5 PG — SIGNIFICANT CHANGE UP (ref 27–34)
MCHC RBC-ENTMCNC: 32.8 G/DL — SIGNIFICANT CHANGE UP (ref 32–36)
MCV RBC AUTO: 96.2 FL — SIGNIFICANT CHANGE UP (ref 80–100)
MONOCYTES # BLD AUTO: 0.44 K/UL — SIGNIFICANT CHANGE UP (ref 0–0.9)
MONOCYTES NFR BLD AUTO: 7.7 % — SIGNIFICANT CHANGE UP (ref 2–14)
NEUTROPHILS # BLD AUTO: 3.47 K/UL — SIGNIFICANT CHANGE UP (ref 1.8–7.4)
NEUTROPHILS NFR BLD AUTO: 60.9 % — SIGNIFICANT CHANGE UP (ref 43–77)
NRBC # BLD: 0 /100 WBCS — SIGNIFICANT CHANGE UP (ref 0–0)
NRBC BLD-RTO: 0 /100 WBCS — SIGNIFICANT CHANGE UP (ref 0–0)
PLATELET # BLD AUTO: 225 K/UL — SIGNIFICANT CHANGE UP (ref 150–400)
POTASSIUM SERPL-MCNC: 4.3 MMOL/L — SIGNIFICANT CHANGE UP (ref 3.5–5.3)
POTASSIUM SERPL-SCNC: 4.3 MMOL/L — SIGNIFICANT CHANGE UP (ref 3.5–5.3)
PROT SERPL-MCNC: 7.1 G/DL — SIGNIFICANT CHANGE UP (ref 6–8.3)
RBC # BLD: 3.65 M/UL — LOW (ref 3.8–5.2)
RBC # FLD: 12.7 % — SIGNIFICANT CHANGE UP (ref 10.3–14.5)
SODIUM SERPL-SCNC: 143 MMOL/L — SIGNIFICANT CHANGE UP (ref 135–145)
WBC # BLD: 5.7 K/UL — SIGNIFICANT CHANGE UP (ref 3.8–10.5)
WBC # FLD AUTO: 5.7 K/UL — SIGNIFICANT CHANGE UP (ref 3.8–10.5)

## 2024-12-27 PROCEDURE — 99213 OFFICE O/P EST LOW 20 MIN: CPT

## 2024-12-27 PROCEDURE — G2211 COMPLEX E/M VISIT ADD ON: CPT

## 2024-12-28 LAB — CEA SERPL-MCNC: 1.2 NG/ML — SIGNIFICANT CHANGE UP (ref 0–3.8)

## 2024-12-30 LAB — CANCER AG27-29 SERPL-ACNC: 8.3 U/ML — SIGNIFICANT CHANGE UP (ref 0–38.6)

## 2025-01-07 ENCOUNTER — APPOINTMENT (OUTPATIENT)
Dept: OTOLARYNGOLOGY | Facility: CLINIC | Age: 70
End: 2025-01-07
Payer: MEDICARE

## 2025-01-07 VITALS
WEIGHT: 174.16 LBS | SYSTOLIC BLOOD PRESSURE: 107 MMHG | DIASTOLIC BLOOD PRESSURE: 71 MMHG | HEIGHT: 65 IN | BODY MASS INDEX: 29.02 KG/M2 | HEART RATE: 82 BPM | OXYGEN SATURATION: 96 %

## 2025-01-07 DIAGNOSIS — H61.21 IMPACTED CERUMEN, RIGHT EAR: ICD-10-CM

## 2025-01-07 DIAGNOSIS — R22.1 LOCALIZED SWELLING, MASS AND LUMP, NECK: ICD-10-CM

## 2025-01-07 PROCEDURE — 99212 OFFICE O/P EST SF 10 MIN: CPT | Mod: 25

## 2025-01-07 PROCEDURE — 69210 REMOVE IMPACTED EAR WAX UNI: CPT | Mod: RT

## 2025-02-18 ENCOUNTER — RX RENEWAL (OUTPATIENT)
Age: 70
End: 2025-02-18

## 2025-02-20 ENCOUNTER — OUTPATIENT (OUTPATIENT)
Dept: OUTPATIENT SERVICES | Facility: HOSPITAL | Age: 70
LOS: 1 days | Discharge: HOME | End: 2025-02-20

## 2025-02-20 DIAGNOSIS — Z98.890 OTHER SPECIFIED POSTPROCEDURAL STATES: Chronic | ICD-10-CM

## 2025-02-20 DIAGNOSIS — C07 MALIGNANT NEOPLASM OF PAROTID GLAND: Chronic | ICD-10-CM

## 2025-02-20 DIAGNOSIS — Z98.89 OTHER SPECIFIED POSTPROCEDURAL STATES: Chronic | ICD-10-CM

## 2025-02-20 DIAGNOSIS — C50.911 MALIGNANT NEOPLASM OF UNSPECIFIED SITE OF RIGHT FEMALE BREAST: Chronic | ICD-10-CM

## 2025-02-20 DIAGNOSIS — H50.9 UNSPECIFIED STRABISMUS: Chronic | ICD-10-CM

## 2025-02-20 DIAGNOSIS — C43.72 MALIGNANT MELANOMA OF LEFT LOWER LIMB, INCLUDING HIP: Chronic | ICD-10-CM

## 2025-02-21 ENCOUNTER — APPOINTMENT (OUTPATIENT)
Dept: INFUSION THERAPY | Facility: HOSPITAL | Age: 70
End: 2025-02-21

## 2025-03-05 DIAGNOSIS — N81.2 INCOMPLETE UTEROVAGINAL PROLAPSE: ICD-10-CM

## 2025-03-11 ENCOUNTER — APPOINTMENT (OUTPATIENT)
Dept: OTOLARYNGOLOGY | Facility: CLINIC | Age: 70
End: 2025-03-11
Payer: MEDICARE

## 2025-03-11 VITALS
DIASTOLIC BLOOD PRESSURE: 100 MMHG | HEIGHT: 65 IN | BODY MASS INDEX: 28.99 KG/M2 | HEART RATE: 80 BPM | WEIGHT: 174 LBS | SYSTOLIC BLOOD PRESSURE: 150 MMHG

## 2025-03-11 DIAGNOSIS — H61.21 IMPACTED CERUMEN, RIGHT EAR: ICD-10-CM

## 2025-03-11 DIAGNOSIS — R22.1 LOCALIZED SWELLING, MASS AND LUMP, NECK: ICD-10-CM

## 2025-03-11 DIAGNOSIS — Z85.818 PERSONAL HISTORY OF MALIGNANT NEOPLASM OF OTHER SITES OF LIP, ORAL CAVITY, AND PHARYNX: ICD-10-CM

## 2025-03-11 PROCEDURE — 99213 OFFICE O/P EST LOW 20 MIN: CPT | Mod: 25

## 2025-03-11 PROCEDURE — 69210 REMOVE IMPACTED EAR WAX UNI: CPT | Mod: RT

## 2025-03-28 ENCOUNTER — APPOINTMENT (OUTPATIENT)
Dept: OPHTHALMOLOGY | Facility: CLINIC | Age: 70
End: 2025-03-28
Payer: MEDICARE

## 2025-03-28 ENCOUNTER — NON-APPOINTMENT (OUTPATIENT)
Age: 70
End: 2025-03-28

## 2025-03-28 PROCEDURE — 92012 INTRM OPH EXAM EST PATIENT: CPT

## 2025-04-18 ENCOUNTER — APPOINTMENT (OUTPATIENT)
Dept: INFUSION THERAPY | Facility: HOSPITAL | Age: 70
End: 2025-04-18

## 2025-04-21 ENCOUNTER — APPOINTMENT (OUTPATIENT)
Dept: UROGYNECOLOGY | Facility: CLINIC | Age: 70
End: 2025-04-21
Payer: MEDICARE

## 2025-04-21 DIAGNOSIS — N81.11 CYSTOCELE, MIDLINE: ICD-10-CM

## 2025-04-21 DIAGNOSIS — N81.2 INCOMPLETE UTEROVAGINAL PROLAPSE: ICD-10-CM

## 2025-04-21 PROCEDURE — 99459 PELVIC EXAMINATION: CPT

## 2025-04-21 PROCEDURE — 51798 US URINE CAPACITY MEASURE: CPT

## 2025-04-21 PROCEDURE — 99213 OFFICE O/P EST LOW 20 MIN: CPT

## 2025-04-25 ENCOUNTER — APPOINTMENT (OUTPATIENT)
Dept: RADIOLOGY | Facility: IMAGING CENTER | Age: 70
End: 2025-04-25
Payer: MEDICARE

## 2025-04-25 ENCOUNTER — OUTPATIENT (OUTPATIENT)
Dept: OUTPATIENT SERVICES | Facility: HOSPITAL | Age: 70
LOS: 1 days | End: 2025-04-25
Payer: COMMERCIAL

## 2025-04-25 DIAGNOSIS — Z98.890 OTHER SPECIFIED POSTPROCEDURAL STATES: Chronic | ICD-10-CM

## 2025-04-25 DIAGNOSIS — C50.911 MALIGNANT NEOPLASM OF UNSPECIFIED SITE OF RIGHT FEMALE BREAST: Chronic | ICD-10-CM

## 2025-04-25 DIAGNOSIS — H50.9 UNSPECIFIED STRABISMUS: Chronic | ICD-10-CM

## 2025-04-25 DIAGNOSIS — C07 MALIGNANT NEOPLASM OF PAROTID GLAND: Chronic | ICD-10-CM

## 2025-04-25 DIAGNOSIS — Z98.89 OTHER SPECIFIED POSTPROCEDURAL STATES: Chronic | ICD-10-CM

## 2025-04-25 DIAGNOSIS — Z00.00 ENCOUNTER FOR GENERAL ADULT MEDICAL EXAMINATION WITHOUT ABNORMAL FINDINGS: ICD-10-CM

## 2025-04-25 DIAGNOSIS — C43.72 MALIGNANT MELANOMA OF LEFT LOWER LIMB, INCLUDING HIP: Chronic | ICD-10-CM

## 2025-04-25 PROCEDURE — 77085 DXA BONE DENSITY AXL VRT FX: CPT

## 2025-04-25 PROCEDURE — 77085 DXA BONE DENSITY AXL VRT FX: CPT | Mod: 26

## 2025-04-29 ENCOUNTER — APPOINTMENT (OUTPATIENT)
Dept: OTOLARYNGOLOGY | Facility: CLINIC | Age: 70
End: 2025-04-29

## 2025-05-01 DIAGNOSIS — Z98.82 BREAST IMPLANT STATUS: ICD-10-CM

## 2025-05-13 ENCOUNTER — APPOINTMENT (OUTPATIENT)
Dept: VASCULAR SURGERY | Facility: CLINIC | Age: 70
End: 2025-05-13
Payer: MEDICARE

## 2025-05-13 VITALS
BODY MASS INDEX: 29.99 KG/M2 | DIASTOLIC BLOOD PRESSURE: 92 MMHG | HEIGHT: 65 IN | WEIGHT: 180 LBS | HEART RATE: 67 BPM | SYSTOLIC BLOOD PRESSURE: 154 MMHG

## 2025-05-13 DIAGNOSIS — I65.23 OCCLUSION AND STENOSIS OF BILATERAL CAROTID ARTERIES: ICD-10-CM

## 2025-05-13 DIAGNOSIS — I77.1 STRICTURE OF ARTERY: ICD-10-CM

## 2025-05-13 PROCEDURE — 93880 EXTRACRANIAL BILAT STUDY: CPT

## 2025-05-13 PROCEDURE — 99214 OFFICE O/P EST MOD 30 MIN: CPT

## 2025-05-16 ENCOUNTER — APPOINTMENT (OUTPATIENT)
Dept: SURGICAL ONCOLOGY | Facility: CLINIC | Age: 70
End: 2025-05-16
Payer: MEDICARE

## 2025-05-16 DIAGNOSIS — Z85.820 PERSONAL HISTORY OF MALIGNANT MELANOMA OF SKIN: ICD-10-CM

## 2025-05-16 PROCEDURE — 99215 OFFICE O/P EST HI 40 MIN: CPT

## 2025-05-16 PROCEDURE — 99417 PROLNG OP E/M EACH 15 MIN: CPT

## 2025-05-29 ENCOUNTER — NON-APPOINTMENT (OUTPATIENT)
Age: 70
End: 2025-05-29

## 2025-06-03 ENCOUNTER — OUTPATIENT (OUTPATIENT)
Dept: OUTPATIENT SERVICES | Facility: HOSPITAL | Age: 70
LOS: 1 days | Discharge: HOME | End: 2025-06-03

## 2025-06-03 DIAGNOSIS — Z98.89 OTHER SPECIFIED POSTPROCEDURAL STATES: Chronic | ICD-10-CM

## 2025-06-03 DIAGNOSIS — C50.911 MALIGNANT NEOPLASM OF UNSPECIFIED SITE OF RIGHT FEMALE BREAST: Chronic | ICD-10-CM

## 2025-06-03 DIAGNOSIS — C43.72 MALIGNANT MELANOMA OF LEFT LOWER LIMB, INCLUDING HIP: Chronic | ICD-10-CM

## 2025-06-03 DIAGNOSIS — C50.919 MALIGNANT NEOPLASM OF UNSPECIFIED SITE OF UNSPECIFIED FEMALE BREAST: ICD-10-CM

## 2025-06-03 DIAGNOSIS — C07 MALIGNANT NEOPLASM OF PAROTID GLAND: Chronic | ICD-10-CM

## 2025-06-03 DIAGNOSIS — Z98.890 OTHER SPECIFIED POSTPROCEDURAL STATES: Chronic | ICD-10-CM

## 2025-06-03 DIAGNOSIS — H50.9 UNSPECIFIED STRABISMUS: Chronic | ICD-10-CM

## 2025-06-12 ENCOUNTER — APPOINTMENT (OUTPATIENT)
Dept: INTERNAL MEDICINE | Facility: CLINIC | Age: 70
End: 2025-06-12

## 2025-06-13 ENCOUNTER — APPOINTMENT (OUTPATIENT)
Dept: HEMATOLOGY ONCOLOGY | Facility: CLINIC | Age: 70
End: 2025-06-13
Payer: MEDICARE

## 2025-06-13 ENCOUNTER — APPOINTMENT (OUTPATIENT)
Dept: INFUSION THERAPY | Facility: HOSPITAL | Age: 70
End: 2025-06-13

## 2025-06-13 VITALS
OXYGEN SATURATION: 97 % | TEMPERATURE: 97.2 F | RESPIRATION RATE: 16 BRPM | HEART RATE: 73 BPM | SYSTOLIC BLOOD PRESSURE: 116 MMHG | BODY MASS INDEX: 29.95 KG/M2 | WEIGHT: 179.99 LBS | DIASTOLIC BLOOD PRESSURE: 71 MMHG

## 2025-06-13 PROCEDURE — G2211 COMPLEX E/M VISIT ADD ON: CPT

## 2025-06-13 PROCEDURE — 99214 OFFICE O/P EST MOD 30 MIN: CPT

## 2025-06-26 ENCOUNTER — OUTPATIENT (OUTPATIENT)
Dept: OUTPATIENT SERVICES | Facility: HOSPITAL | Age: 70
LOS: 1 days | End: 2025-06-26
Payer: COMMERCIAL

## 2025-06-26 ENCOUNTER — APPOINTMENT (OUTPATIENT)
Dept: INTERNAL MEDICINE | Facility: CLINIC | Age: 70
End: 2025-06-26

## 2025-06-26 VITALS
WEIGHT: 176 LBS | HEIGHT: 65 IN | SYSTOLIC BLOOD PRESSURE: 110 MMHG | BODY MASS INDEX: 29.32 KG/M2 | OXYGEN SATURATION: 97 % | HEART RATE: 75 BPM | DIASTOLIC BLOOD PRESSURE: 80 MMHG

## 2025-06-26 DIAGNOSIS — C07 MALIGNANT NEOPLASM OF PAROTID GLAND: Chronic | ICD-10-CM

## 2025-06-26 DIAGNOSIS — I10 ESSENTIAL (PRIMARY) HYPERTENSION: ICD-10-CM

## 2025-06-26 DIAGNOSIS — H50.9 UNSPECIFIED STRABISMUS: Chronic | ICD-10-CM

## 2025-06-26 DIAGNOSIS — C43.72 MALIGNANT MELANOMA OF LEFT LOWER LIMB, INCLUDING HIP: Chronic | ICD-10-CM

## 2025-06-26 DIAGNOSIS — Z98.89 OTHER SPECIFIED POSTPROCEDURAL STATES: Chronic | ICD-10-CM

## 2025-06-26 DIAGNOSIS — Z98.890 OTHER SPECIFIED POSTPROCEDURAL STATES: Chronic | ICD-10-CM

## 2025-06-26 PROCEDURE — G2211 COMPLEX E/M VISIT ADD ON: CPT

## 2025-06-26 PROCEDURE — 99213 OFFICE O/P EST LOW 20 MIN: CPT

## 2025-06-26 PROCEDURE — G0463: CPT

## 2025-07-02 ENCOUNTER — APPOINTMENT (OUTPATIENT)
Dept: ULTRASOUND IMAGING | Facility: IMAGING CENTER | Age: 70
End: 2025-07-02
Payer: MEDICARE

## 2025-07-02 ENCOUNTER — OUTPATIENT (OUTPATIENT)
Dept: OUTPATIENT SERVICES | Facility: HOSPITAL | Age: 70
LOS: 1 days | End: 2025-07-02
Payer: COMMERCIAL

## 2025-07-02 ENCOUNTER — RESULT REVIEW (OUTPATIENT)
Age: 70
End: 2025-07-02

## 2025-07-02 DIAGNOSIS — Z00.8 ENCOUNTER FOR OTHER GENERAL EXAMINATION: ICD-10-CM

## 2025-07-02 DIAGNOSIS — Z98.890 OTHER SPECIFIED POSTPROCEDURAL STATES: Chronic | ICD-10-CM

## 2025-07-02 DIAGNOSIS — H50.9 UNSPECIFIED STRABISMUS: Chronic | ICD-10-CM

## 2025-07-02 DIAGNOSIS — Z98.89 OTHER SPECIFIED POSTPROCEDURAL STATES: Chronic | ICD-10-CM

## 2025-07-02 DIAGNOSIS — Z98.82 BREAST IMPLANT STATUS: ICD-10-CM

## 2025-07-02 DIAGNOSIS — C50.911 MALIGNANT NEOPLASM OF UNSPECIFIED SITE OF RIGHT FEMALE BREAST: Chronic | ICD-10-CM

## 2025-07-02 DIAGNOSIS — C43.72 MALIGNANT MELANOMA OF LEFT LOWER LIMB, INCLUDING HIP: Chronic | ICD-10-CM

## 2025-07-02 DIAGNOSIS — C07 MALIGNANT NEOPLASM OF PAROTID GLAND: Chronic | ICD-10-CM

## 2025-07-02 DIAGNOSIS — Z85.3 PERSONAL HISTORY OF MALIGNANT NEOPLASM OF BREAST: ICD-10-CM

## 2025-07-02 PROCEDURE — 76641 ULTRASOUND BREAST COMPLETE: CPT | Mod: 26,50

## 2025-07-02 PROCEDURE — 76641 ULTRASOUND BREAST COMPLETE: CPT

## 2025-07-08 ENCOUNTER — APPOINTMENT (OUTPATIENT)
Dept: VASCULAR SURGERY | Facility: CLINIC | Age: 70
End: 2025-07-08
Payer: MEDICARE

## 2025-07-08 VITALS
SYSTOLIC BLOOD PRESSURE: 141 MMHG | BODY MASS INDEX: 29.99 KG/M2 | DIASTOLIC BLOOD PRESSURE: 87 MMHG | WEIGHT: 180 LBS | TEMPERATURE: 97.7 F | HEART RATE: 83 BPM | HEIGHT: 65 IN

## 2025-07-08 DIAGNOSIS — R73.09 OTHER ABNORMAL GLUCOSE: ICD-10-CM

## 2025-07-08 DIAGNOSIS — I77.1 STRICTURE OF ARTERY: ICD-10-CM

## 2025-07-08 DIAGNOSIS — E78.5 HYPERLIPIDEMIA, UNSPECIFIED: ICD-10-CM

## 2025-07-08 PROCEDURE — ZZZZZ: CPT

## 2025-07-08 PROCEDURE — 99214 OFFICE O/P EST MOD 30 MIN: CPT

## 2025-07-08 PROCEDURE — 93923 UPR/LXTR ART STDY 3+ LVLS: CPT

## 2025-07-14 ENCOUNTER — RX RENEWAL (OUTPATIENT)
Age: 70
End: 2025-07-14

## 2025-07-15 ENCOUNTER — APPOINTMENT (OUTPATIENT)
Dept: OTOLARYNGOLOGY | Facility: CLINIC | Age: 70
End: 2025-07-15
Payer: MEDICARE

## 2025-07-15 VITALS
DIASTOLIC BLOOD PRESSURE: 78 MMHG | BODY MASS INDEX: 29.99 KG/M2 | WEIGHT: 180 LBS | SYSTOLIC BLOOD PRESSURE: 118 MMHG | HEART RATE: 72 BPM | HEIGHT: 65 IN

## 2025-07-15 PROCEDURE — 69210 REMOVE IMPACTED EAR WAX UNI: CPT

## 2025-07-15 PROCEDURE — 99213 OFFICE O/P EST LOW 20 MIN: CPT | Mod: 25

## 2025-07-29 ENCOUNTER — APPOINTMENT (OUTPATIENT)
Dept: PLASTIC SURGERY | Facility: CLINIC | Age: 70
End: 2025-07-29
Payer: MEDICARE

## 2025-07-29 VITALS
BODY MASS INDEX: 29.99 KG/M2 | RESPIRATION RATE: 16 BRPM | WEIGHT: 180 LBS | DIASTOLIC BLOOD PRESSURE: 90 MMHG | SYSTOLIC BLOOD PRESSURE: 143 MMHG | HEART RATE: 80 BPM | OXYGEN SATURATION: 96 % | HEIGHT: 65 IN | TEMPERATURE: 97.9 F

## 2025-07-29 PROCEDURE — 99213 OFFICE O/P EST LOW 20 MIN: CPT

## 2025-08-15 ENCOUNTER — APPOINTMENT (OUTPATIENT)
Dept: INFUSION THERAPY | Facility: HOSPITAL | Age: 70
End: 2025-08-15

## (undated) DEVICE — SOL INJ NS 0.9% 500ML 2 PORT

## (undated) DEVICE — SUCTION YANKAUER NO CONTROL VENT

## (undated) DEVICE — SYR ALLIANCE II INFLATION 60ML

## (undated) DEVICE — ELCTR GROUNDING PAD ADULT COVIDIEN

## (undated) DEVICE — TUBING CAP SET ENDO 24HR USE GI

## (undated) DEVICE — SYR LUER LOK 50CC

## (undated) DEVICE — IRRIGATOR BIO SHIELD

## (undated) DEVICE — BRUSH COLONOSCOPY CYTOLOGY

## (undated) DEVICE — CATH IV SAFE BC 22G X 1" (BLUE)

## (undated) DEVICE — FORCEP RADIAL JAW 4 JUMBO 2.8MM 3.2MM 240CM ORANGE DISP

## (undated) DEVICE — TUBING SUCTION CONN 6FT STERILE

## (undated) DEVICE — POLY TRAP ETRAP

## (undated) DEVICE — CLAMP BX HOT RAD JAW 3

## (undated) DEVICE — CATH IV SAFE BC 20G X 1.16" (PINK)

## (undated) DEVICE — PACK IV START WITH CHG

## (undated) DEVICE — SENSOR O2 FINGER ADULT 24/CA

## (undated) DEVICE — BALLOON US ENDO

## (undated) DEVICE — FOLEY HOLDER STATLOCK 2 WAY ADULT

## (undated) DEVICE — TUBING IV SET GRAVITY 3Y 100" MACRO

## (undated) DEVICE — Device

## (undated) DEVICE — TUBING SUCTION 20FT

## (undated) DEVICE — BITE BLOCK ADULT 20 X 27MM (GREEN)

## (undated) DEVICE — BIOPSY FORCEP RADIAL JAW 4 STANDARD WITH NEEDLE